# Patient Record
Sex: FEMALE | Race: WHITE | HISPANIC OR LATINO | Employment: UNEMPLOYED | URBAN - METROPOLITAN AREA
[De-identification: names, ages, dates, MRNs, and addresses within clinical notes are randomized per-mention and may not be internally consistent; named-entity substitution may affect disease eponyms.]

---

## 2018-02-12 ENCOUNTER — LAB (OUTPATIENT)
Dept: LAB | Facility: HOSPITAL | Age: 43
End: 2018-02-12
Payer: COMMERCIAL

## 2018-02-12 ENCOUNTER — OFFICE VISIT (OUTPATIENT)
Dept: FAMILY MEDICINE CLINIC | Facility: CLINIC | Age: 43
End: 2018-02-12
Payer: COMMERCIAL

## 2018-02-12 VITALS
TEMPERATURE: 97.5 F | WEIGHT: 113.2 LBS | HEART RATE: 113 BPM | HEIGHT: 65 IN | SYSTOLIC BLOOD PRESSURE: 102 MMHG | BODY MASS INDEX: 18.86 KG/M2 | DIASTOLIC BLOOD PRESSURE: 74 MMHG | OXYGEN SATURATION: 96 %

## 2018-02-12 DIAGNOSIS — E78.5 HYPERLIPIDEMIA, UNSPECIFIED HYPERLIPIDEMIA TYPE: ICD-10-CM

## 2018-02-12 DIAGNOSIS — Z12.31 ENCOUNTER FOR SCREENING MAMMOGRAM FOR BREAST CANCER: ICD-10-CM

## 2018-02-12 DIAGNOSIS — T78.40XA ALLERGIC STATE, INITIAL ENCOUNTER: ICD-10-CM

## 2018-02-12 DIAGNOSIS — R53.83 FATIGUE, UNSPECIFIED TYPE: ICD-10-CM

## 2018-02-12 DIAGNOSIS — R05.9 COUGH: Primary | ICD-10-CM

## 2018-02-12 DIAGNOSIS — Z83.71 FAMILY HX COLONIC POLYPS: ICD-10-CM

## 2018-02-12 DIAGNOSIS — R30.0 DYSURIA: ICD-10-CM

## 2018-02-12 LAB
ALBUMIN SERPL BCP-MCNC: 4.5 G/DL (ref 3.5–5)
ALP SERPL-CCNC: 69 U/L (ref 46–116)
ALT SERPL W P-5'-P-CCNC: 19 U/L (ref 12–78)
ANION GAP SERPL CALCULATED.3IONS-SCNC: 9 MMOL/L (ref 4–13)
AST SERPL W P-5'-P-CCNC: 22 U/L (ref 5–45)
BACTERIA UR QL AUTO: ABNORMAL /HPF
BASOPHILS # BLD AUTO: 0.06 THOUSANDS/ΜL (ref 0–0.1)
BASOPHILS NFR BLD AUTO: 1 % (ref 0–1)
BILIRUB SERPL-MCNC: 0.7 MG/DL (ref 0.2–1)
BILIRUB UR QL STRIP: NEGATIVE
BUN SERPL-MCNC: 9 MG/DL (ref 5–25)
CALCIUM SERPL-MCNC: 9.5 MG/DL (ref 8.3–10.1)
CHLORIDE SERPL-SCNC: 99 MMOL/L (ref 100–108)
CHOLEST SERPL-MCNC: 201 MG/DL (ref 50–200)
CLARITY UR: CLEAR
CO2 SERPL-SCNC: 29 MMOL/L (ref 21–32)
COLOR UR: YELLOW
CREAT SERPL-MCNC: 0.63 MG/DL (ref 0.6–1.3)
EOSINOPHIL # BLD AUTO: 0.08 THOUSAND/ΜL (ref 0–0.61)
EOSINOPHIL NFR BLD AUTO: 2 % (ref 0–6)
ERYTHROCYTE [DISTWIDTH] IN BLOOD BY AUTOMATED COUNT: 12.1 % (ref 11.6–15.1)
GFR SERPL CREATININE-BSD FRML MDRD: 112 ML/MIN/1.73SQ M
GLUCOSE P FAST SERPL-MCNC: 92 MG/DL (ref 65–99)
GLUCOSE UR STRIP-MCNC: NEGATIVE MG/DL
HCT VFR BLD AUTO: 46.2 % (ref 34.8–46.1)
HDLC SERPL-MCNC: 88 MG/DL (ref 40–60)
HGB BLD-MCNC: 15.3 G/DL (ref 11.5–15.4)
HGB UR QL STRIP.AUTO: ABNORMAL
KETONES UR STRIP-MCNC: NEGATIVE MG/DL
LDLC SERPL CALC-MCNC: 100 MG/DL (ref 0–100)
LEUKOCYTE ESTERASE UR QL STRIP: NEGATIVE
LYMPHOCYTES # BLD AUTO: 1.48 THOUSANDS/ΜL (ref 0.6–4.47)
LYMPHOCYTES NFR BLD AUTO: 35 % (ref 14–44)
MCH RBC QN AUTO: 31.9 PG (ref 26.8–34.3)
MCHC RBC AUTO-ENTMCNC: 33.1 G/DL (ref 31.4–37.4)
MCV RBC AUTO: 96 FL (ref 82–98)
MONOCYTES # BLD AUTO: 0.31 THOUSAND/ΜL (ref 0.17–1.22)
MONOCYTES NFR BLD AUTO: 7 % (ref 4–12)
NEUTROPHILS # BLD AUTO: 2.32 THOUSANDS/ΜL (ref 1.85–7.62)
NEUTS SEG NFR BLD AUTO: 55 % (ref 43–75)
NITRITE UR QL STRIP: NEGATIVE
NON-SQ EPI CELLS URNS QL MICRO: ABNORMAL /HPF
NRBC BLD AUTO-RTO: 0 /100 WBCS
PH UR STRIP.AUTO: 6 [PH] (ref 4.5–8)
PLATELET # BLD AUTO: 232 THOUSANDS/UL (ref 149–390)
PMV BLD AUTO: 10.8 FL (ref 8.9–12.7)
POTASSIUM SERPL-SCNC: 4.3 MMOL/L (ref 3.5–5.3)
PROT SERPL-MCNC: 8.6 G/DL (ref 6.4–8.2)
PROT UR STRIP-MCNC: NEGATIVE MG/DL
RBC # BLD AUTO: 4.8 MILLION/UL (ref 3.81–5.12)
RBC #/AREA URNS AUTO: ABNORMAL /HPF
SODIUM SERPL-SCNC: 137 MMOL/L (ref 136–145)
SP GR UR STRIP.AUTO: 1.02 (ref 1–1.03)
TRIGL SERPL-MCNC: 67 MG/DL
TSH SERPL DL<=0.05 MIU/L-ACNC: 1.85 UIU/ML (ref 0.36–3.74)
UROBILINOGEN UR QL STRIP.AUTO: 0.2 E.U./DL
WBC # BLD AUTO: 4.26 THOUSAND/UL (ref 4.31–10.16)
WBC #/AREA URNS AUTO: ABNORMAL /HPF

## 2018-02-12 PROCEDURE — 80053 COMPREHEN METABOLIC PANEL: CPT

## 2018-02-12 PROCEDURE — 84443 ASSAY THYROID STIM HORMONE: CPT

## 2018-02-12 PROCEDURE — 82785 ASSAY OF IGE: CPT

## 2018-02-12 PROCEDURE — 36415 COLL VENOUS BLD VENIPUNCTURE: CPT

## 2018-02-12 PROCEDURE — 99204 OFFICE O/P NEW MOD 45 MIN: CPT | Performed by: FAMILY MEDICINE

## 2018-02-12 PROCEDURE — 86003 ALLG SPEC IGE CRUDE XTRC EA: CPT

## 2018-02-12 PROCEDURE — 80061 LIPID PANEL: CPT

## 2018-02-12 PROCEDURE — 81001 URINALYSIS AUTO W/SCOPE: CPT | Performed by: FAMILY MEDICINE

## 2018-02-12 PROCEDURE — 85025 COMPLETE CBC W/AUTO DIFF WBC: CPT

## 2018-02-12 RX ORDER — AZITHROMYCIN 250 MG/1
TABLET, FILM COATED ORAL
Qty: 6 TABLET | Refills: 0 | Status: SHIPPED | OUTPATIENT
Start: 2018-02-12 | End: 2018-02-16

## 2018-02-12 RX ORDER — AZITHROMYCIN 250 MG/1
TABLET, FILM COATED ORAL DAILY
COMMUNITY
Start: 2015-01-14 | End: 2018-05-24 | Stop reason: CLARIF

## 2018-02-12 NOTE — PROGRESS NOTES
NEW PATIENT VISIT  TO ESTABLISH CARE    Assessment/Plan:    Cough   Fluticasone 1 spray twice a day   add some humidity to the house   are we will add some allergy panels to the screening blood work      Allergic state   Immuno cap allergy testing   colonoscopy   set up and ordered through gastro consult      Mammography   ordered in chart     allergies     immuno cap allergy testing ordered in chart           /74   Pulse (!) 113   Temp 97 5 °F (36 4 °C)   Ht 5' 5" (1 651 m)   Wt 51 3 kg (113 lb 3 2 oz)   SpO2 96%   BMI 18 84 kg/m²   Social History     Social History    Marital status: /Civil Union     Spouse name: N/A    Number of children: N/A    Years of education: N/A     Occupational History    Not on file  Social History Main Topics    Smoking status: Never Smoker    Smokeless tobacco: Never Used    Alcohol use No    Drug use: Unknown    Sexual activity: Not on file     Other Topics Concern    Not on file     Social History Narrative    No narrative on file     No past medical history on file  Family History   Problem Relation Age of Onset    No Known Problems Mother      No past surgical history on file  Current Outpatient Prescriptions:     azithromycin (ZITHROMAX) 250 mg tablet, Take by mouth daily, Disp: , Rfl:     azithromycin (ZITHROMAX) 250 mg tablet, Take 2 pills today then 1 pill on day 2 through 5 til done, Disp: 6 tablet, Rfl: 0      Subjective:      Patient ID: Rosalind Rhoades is a 40 y o  female    Patient with the following concerns:    New patient here to establish   also she has a cough but she is complaining of  She has been seen by Dr Eleuterio monreal for the cough   was seen by Dr Brandee Baird in the past      times 24 years with 3child  15years old   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                Review of Systems   Constitutional: Negative for activity change, fatigue, fever and unexpected weight change  HENT: Negative for congestion, ear pain, hearing loss, sinus pain, sore throat, tinnitus, trouble swallowing and voice change  Eyes: Negative for pain, discharge and visual disturbance  Respiratory: Positive for cough  Negative for chest tightness, shortness of breath and wheezing  Cardiovascular: Negative for chest pain, palpitations and leg swelling  Gastrointestinal: Negative for abdominal pain, blood in stool, diarrhea and vomiting  Endocrine: Negative for cold intolerance, heat intolerance and polyuria  Genitourinary: Negative for difficulty urinating, dysuria, flank pain, genital sores and urgency  Musculoskeletal: Negative for gait problem, joint swelling and neck stiffness  Skin: Negative for color change, rash and wound  Allergic/Immunologic: Negative for immunocompromised state  Neurological: Negative for syncope, speech difficulty and light-headedness  Psychiatric/Behavioral: Negative for behavioral problems, dysphoric mood and suicidal ideas  Objective:     Physical Exam   Constitutional: She is oriented to person, place, and time  She appears well-developed and well-nourished  HENT:   Head: Normocephalic and atraumatic  Eyes: Pupils are equal, round, and reactive to light  Neck: Normal range of motion  Neck supple  Cardiovascular: Normal rate and normal heart sounds  No murmur heard  Pulmonary/Chest: Effort normal and breath sounds normal  No respiratory distress  She exhibits no tenderness  Abdominal: Soft  Bowel sounds are normal    Musculoskeletal: Normal range of motion  She exhibits no tenderness  Lymphadenopathy:     She has no cervical adenopathy  Neurological: She is alert and oriented to person, place, and time  Coordination normal    Skin: Skin is warm and dry  Psychiatric: She has a normal mood and affect  Thought content normal    Nursing note and vitals reviewed        Social History     Social History    Marital status: /Civil [employer]     Spouse name: N/A    Number of children: N/A    Years of education: N/A     Occupational History    Not on file  Social History Main Topics    Smoking status: Never Smoker    Smokeless tobacco: Never Used    Alcohol use No    Drug use: Unknown    Sexual activity: Not on file     Other Topics Concern    Not on file     Social History Narrative    No narrative on file     No past medical history on file      Current Outpatient Prescriptions:     azithromycin (ZITHROMAX) 250 mg tablet, Take by mouth daily, Disp: , Rfl:     azithromycin (ZITHROMAX) 250 mg tablet, Take 2 pills today then 1 pill on day 2 through 5 til done, Disp: 6 tablet, Rfl: 0  Family History   Problem Relation Age of Onset    No Known Problems Mother        Patient Instructions    Fluticasone 1 spray twice a day   add some humidity to the house   are we will add some allergy panels to the screening blood work     mammo ordered   we will order Z-Melvin for the cough   set up a follow-up appointment in about   3 weeks   we will also we will also order the colonoscopy due to your mother's recent diagnosis of  "Pre cancerous polyps"      JERMAINE Reyes

## 2018-02-12 NOTE — ASSESSMENT & PLAN NOTE
Fluticasone 1 spray twice a day   add some humidity to the house   are we will add some allergy panels to the screening blood work

## 2018-02-12 NOTE — PATIENT INSTRUCTIONS
Fluticasone 1 spray twice a day   add some humidity to the house   are we will add some allergy panels to the screening blood work     mammo ordered   we will order Z-Melvin for the cough   set up a follow-up appointment in about   3 weeks   we will also we will also order the colonoscopy due to your mother's recent diagnosis of  "Pre cancerous polyps"

## 2018-02-13 ENCOUNTER — HOSPITAL ENCOUNTER (OUTPATIENT)
Dept: MAMMOGRAPHY | Facility: CLINIC | Age: 43
Discharge: HOME | End: 2018-02-13
Payer: COMMERCIAL

## 2018-02-13 DIAGNOSIS — Z12.31 ENCOUNTER FOR SCREENING MAMMOGRAM FOR BREAST CANCER: ICD-10-CM

## 2018-02-13 LAB
A ALTERNATA IGE QN: <0.1 KUA/I
A FUMIGATUS IGE QN: <0.1 KUA/I
ALLERGEN COMMENT: ABNORMAL
ALLERGEN COMMENT: ABNORMAL
ALMOND IGE QN: <0.1 KUA/I
BERMUDA GRASS IGE QN: <0.1 KUA/I
BOXELDER IGE QN: <0.1 KUA/I
C HERBARUM IGE QN: <0.1 KUA/I
CASHEW NUT IGE QN: <0.1 KUA/I
CAT DANDER IGE QN: <0.1 KUA/I
CMN PIGWEED IGE QN: <0.1 KUA/I
CODFISH IGE QN: <0.1 KUA/I
COMMON RAGWEED IGE QN: <0.1 KUA/I
COTTONWOOD IGE QN: <0.1 KUA/I
D FARINAE IGE QN: 0.1 KUA/I
D PTERONYSS IGE QN: 0.13 KUA/I
DOG DANDER IGE QN: <0.1 KUA/I
EGG WHITE IGE QN: <0.1 KUA/I
GLUTEN IGE QN: <0.1 KUA/I
HAZELNUT IGE QN: <0.1 KUA/L
LONDON PLANE IGE QN: <0.1 KUA/I
MILK IGE QN: <0.1 KUA/I
MOUSE URINE PROT IGE QN: <0.1 KUA/I
MT JUNIPER IGE QN: <0.1 KUA/I
MUGWORT IGE QN: <0.1 KUA/I
P NOTATUM IGE QN: <0.1 KUA/I
PEANUT IGE QN: <0.1 KUA/I
ROACH IGE QN: 0.26 KUA/I
SALMON IGE QN: <0.1 KUA/I
SCALLOP IGE QN: 0.11 KUA/L
SESAME SEED IGE QN: <0.1 KUA/I
SHEEP SORREL IGE QN: <0.1 KUA/I
SHRIMP IGE QN: <0.1 KUA/L
SILVER BIRCH IGE QN: <0.1 KUA/I
SOYBEAN IGE QN: <0.1 KUA/I
TIMOTHY IGE QN: <0.1 KUA/I
TOTAL IGE SMQN RAST: 113 KU/L (ref 0–113)
TOTAL IGE SMQN RAST: 122 KU/L (ref 0–113)
TUNA IGE QN: <0.1 KUA/I
WALNUT IGE QN: 0.26 KUA/I
WALNUT IGE QN: <0.1 KUA/I
WHEAT IGE QN: <0.1 KUA/I
WHITE ASH IGE QN: 0.66 KUA/I
WHITE ELM IGE QN: <0.1 KUA/I
WHITE MULBERRY IGE QN: <0.1 KUA/I
WHITE OAK IGE QN: <0.1 KUA/I

## 2018-02-13 PROCEDURE — 77067 SCR MAMMO BI INCL CAD: CPT

## 2018-02-15 DIAGNOSIS — R77.9 ELEVATED SERUM PROTEIN LEVEL: Primary | ICD-10-CM

## 2018-02-18 DIAGNOSIS — T78.40XA ALLERGIC STATE, INITIAL ENCOUNTER: Primary | ICD-10-CM

## 2018-02-18 RX ORDER — EPINEPHRINE 0.3 MG/.3ML
0.3 INJECTION SUBCUTANEOUS AS NEEDED
Status: SHIPPED | OUTPATIENT
Start: 2018-02-18

## 2018-02-23 NOTE — PROGRESS NOTES
Patient is aware and said she has not yet picked up epi pen and has stopped eating walnuts but is still eating some of the other foods she is allergic too

## 2018-03-06 ENCOUNTER — TELEPHONE (OUTPATIENT)
Dept: FAMILY MEDICINE CLINIC | Facility: CLINIC | Age: 43
End: 2018-03-06

## 2018-03-06 NOTE — TELEPHONE ENCOUNTER
Patient is aware that Taj Garcia is out of the office until Thursday    Tj Olivas Patient called requesting a refill of her antibiotics  She states that she finished the antibiotics from about 2 weeks ago  She was feeling better, but feels like it is coming back again  She uses [facility]   She can be reached at P: [phone]    Thank you, Tomas Field

## 2018-03-08 NOTE — TELEPHONE ENCOUNTER
She should come in  What she has now could possibly be viral etc  We do not order antibiotics without knowing exactly what we are treating    Just because the symptoms feel the same , does not mean the diagnosis is the same

## 2018-03-12 NOTE — TELEPHONE ENCOUNTER
Message left on patients voicemail to call us back to schedule an appointment if she is still having the symptoms    [id number]

## 2018-05-24 ENCOUNTER — OFFICE VISIT (OUTPATIENT)
Dept: FAMILY MEDICINE CLINIC | Facility: CLINIC | Age: 43
End: 2018-05-24
Payer: COMMERCIAL

## 2018-05-24 VITALS
OXYGEN SATURATION: 98 % | WEIGHT: 113.4 LBS | BODY MASS INDEX: 18.89 KG/M2 | HEIGHT: 65 IN | HEART RATE: 91 BPM | SYSTOLIC BLOOD PRESSURE: 110 MMHG | TEMPERATURE: 98.2 F | DIASTOLIC BLOOD PRESSURE: 62 MMHG

## 2018-05-24 DIAGNOSIS — J01.00 ACUTE NON-RECURRENT MAXILLARY SINUSITIS: Primary | ICD-10-CM

## 2018-05-24 DIAGNOSIS — H10.231 SEROUS CONJUNCTIVITIS OF RIGHT EYE: ICD-10-CM

## 2018-05-24 PROCEDURE — 99214 OFFICE O/P EST MOD 30 MIN: CPT | Performed by: FAMILY MEDICINE

## 2018-05-24 RX ORDER — AMOXICILLIN 875 MG/1
875 TABLET, COATED ORAL 2 TIMES DAILY
Qty: 20 TABLET | Refills: 0 | Status: SHIPPED | OUTPATIENT
Start: 2018-05-24 | End: 2018-06-03

## 2018-05-24 RX ORDER — MOXIFLOXACIN 5 MG/ML
1 SOLUTION/ DROPS OPHTHALMIC 3 TIMES DAILY
Qty: 6 ML | Refills: 0 | Status: SHIPPED | OUTPATIENT
Start: 2018-05-24 | End: 2018-05-29

## 2018-05-24 NOTE — PROGRESS NOTES
Standard Visit   Assessment/Plan:     Visit Diagnosis:  Diagnoses and all orders for this visit:    Acute non-recurrent maxillary sinusitis  -     amoxicillin (AMOXIL) 875 mg tablet; Take 1 tablet (875 mg total) by mouth 2 (two) times a day for 20 doses    Serous conjunctivitis of right eye  -     moxifloxacin (VIGAMOX) 0 5 % ophthalmic solution; Administer 1 drop to both eyes 3 (three) times a day for 5 days       complete the entire 10 days of the antibiotic   I want you to start over-the-counter Flonase 1 spray each side ear nose morning and night   Keep the nasal spray going until the end of spring   sleep with bedroom window shut    call if you fail to improve    Subjective:    Patient ID: Susannah Borges is a 40 y o  female  Patient is here with the following concerns:    Here with complaints of head congestion  Feeling very tired congested   sore throat that comes and goes   coughing now   And also her left eye is now red   scratchy and itchy   no fever      Sore Throat    Associated symptoms include congestion and coughing  Cough   Associated symptoms include a sore throat  The following portions of the patient's history were reviewed and updated as appropriate: allergies, current medications, past family history, past medical history, past social history, past surgical history and problem list     Review of Systems   HENT: Positive for congestion, sinus pressure, sneezing, sore throat and voice change  Respiratory: Positive for cough  /62   Pulse 91   Temp 98 2 °F (36 8 °C)   Ht 5' 5" (1 651 m)   Wt 51 4 kg (113 lb 6 4 oz)   SpO2 98%   BMI 18 87 kg/m²   Social History     Social History    Marital status: /Civil Union     Spouse name: N/A    Number of children: N/A    Years of education: N/A     Occupational History    Not on file       Social History Main Topics    Smoking status: Never Smoker    Smokeless tobacco: Never Used    Alcohol use No    Drug use: Unknown    Sexual activity: Not on file     Other Topics Concern    Not on file     Social History Narrative    No narrative on file     No past medical history on file  Family History   Problem Relation Age of Onset    No Known Problems Mother      No past surgical history on file  Current Outpatient Prescriptions:     amoxicillin (AMOXIL) 875 mg tablet, Take 1 tablet (875 mg total) by mouth 2 (two) times a day for 20 doses, Disp: 20 tablet, Rfl: 0    moxifloxacin (VIGAMOX) 0 5 % ophthalmic solution, Administer 1 drop to both eyes 3 (three) times a day for 5 days, Disp: 6 mL, Rfl: 0    Current Facility-Administered Medications:     EPINEPHrine (EPIPEN) injection 0 3 mg, 0 3 mg, Intramuscular, CHANNINGN, JERMAINE Narayan      Objective:     Physical Exam   Constitutional: She is oriented to person, place, and time  She appears well-developed and well-nourished  HENT:   Head: Normocephalic and atraumatic  Eyes: Pupils are equal, round, and reactive to light  Neck: Normal range of motion  Neck supple  Cardiovascular: Normal rate and normal heart sounds  No murmur heard  Pulmonary/Chest: Effort normal and breath sounds normal  No respiratory distress  She exhibits no tenderness  Abdominal: Soft  Bowel sounds are normal    Musculoskeletal: Normal range of motion  She exhibits no tenderness  Lymphadenopathy:     She has no cervical adenopathy  Neurological: She is alert and oriented to person, place, and time  Coordination normal    Skin: Skin is warm and dry  Psychiatric: She has a normal mood and affect  Thought content normal    Nursing note and vitals reviewed  Social History     Social History    Marital status: /Civil Union     Spouse name: N/A    Number of children: N/A    Years of education: N/A     Occupational History    Not on file       Social History Main Topics    Smoking status: Never Smoker    Smokeless tobacco: Never Used    Alcohol use No    Drug use: Unknown    Sexual activity: Not on file     Other Topics Concern    Not on file     Social History Narrative    No narrative on file     No past medical history on file  Current Outpatient Prescriptions:     amoxicillin (AMOXIL) 875 mg tablet, Take 1 tablet (875 mg total) by mouth 2 (two) times a day for 20 doses, Disp: 20 tablet, Rfl: 0    moxifloxacin (VIGAMOX) 0 5 % ophthalmic solution, Administer 1 drop to both eyes 3 (three) times a day for 5 days, Disp: 6 mL, Rfl: 0    Current Facility-Administered Medications:     EPINEPHrine (EPIPEN) injection 0 3 mg, 0 3 mg, Intramuscular, PRN, JERMAINE Linn  Family History   Problem Relation Age of Onset    No Known Problems Mother        There are no Patient Instructions on file for this visit       complete the entire 10 days of the antibiotic   I want you to start over-the-counter Flonase 1 spray each side ear nose morning and night   Keep the nasal spray going until the end of spring   sleep with bedroom window shut    call if you fail to improve        JERMAINE Linn

## 2018-05-24 NOTE — PATIENT INSTRUCTIONS
complete the entire 10 days of the antibiotic   I want you to start over-the-counter Flonase 1 spray each side ear nose morning and night   Keep the nasal spray going until the end of spring   sleep with bedroom window shut    call if you fail to improve

## 2019-02-22 ENCOUNTER — OFFICE VISIT (OUTPATIENT)
Dept: FAMILY MEDICINE CLINIC | Facility: CLINIC | Age: 44
End: 2019-02-22
Payer: COMMERCIAL

## 2019-02-22 VITALS
DIASTOLIC BLOOD PRESSURE: 72 MMHG | OXYGEN SATURATION: 98 % | WEIGHT: 113 LBS | SYSTOLIC BLOOD PRESSURE: 108 MMHG | BODY MASS INDEX: 18.83 KG/M2 | HEIGHT: 65 IN | RESPIRATION RATE: 17 BRPM | HEART RATE: 78 BPM | TEMPERATURE: 97.5 F

## 2019-02-22 DIAGNOSIS — J06.9 URI, ACUTE: Primary | ICD-10-CM

## 2019-02-22 PROCEDURE — 99213 OFFICE O/P EST LOW 20 MIN: CPT | Performed by: FAMILY MEDICINE

## 2019-02-22 RX ORDER — AZITHROMYCIN 250 MG/1
500 TABLET, FILM COATED ORAL EVERY 24 HOURS
Qty: 10 TABLET | Refills: 0 | Status: SHIPPED | OUTPATIENT
Start: 2019-02-22 | End: 2019-03-01 | Stop reason: SDUPTHER

## 2019-02-22 NOTE — PROGRESS NOTES
Assessment/Plan:           Problem List Items Addressed This Visit        Respiratory    URI, acute - Primary    Relevant Medications    azithromycin (ZITHROMAX) 250 mg tablet            Subjective:      Patient ID: Joseph Alvarez is a 44 y o  female  Patient comes in with a 2 week history of cough  The following portions of the patient's history were reviewed and updated as appropriate: allergies, current medications, past family history, past medical history, past social history, past surgical history and problem list     Review of Systems   Constitutional: Negative  HENT: Positive for congestion  Respiratory: Positive for cough  Gastrointestinal: Negative  Objective:      /72   Pulse 78   Temp 97 5 °F (36 4 °C)   Resp 17   Ht 5' 5" (1 651 m)   Wt 51 3 kg (113 lb)   SpO2 98%   BMI 18 80 kg/m²          Physical Exam   Constitutional: She is oriented to person, place, and time  She appears well-developed  HENT:   Head: Normocephalic and atraumatic  Right Ear: Tympanic membrane and external ear normal    Left Ear: Tympanic membrane and external ear normal    Paranasal sinus are red bilaterally  Posterior oropharynx is injected   Eyes: Pupils are equal, round, and reactive to light  EOM are normal    Neck: Neck supple  Cardiovascular: Normal rate, regular rhythm and normal heart sounds  Pulmonary/Chest: Effort normal and breath sounds normal    Abdominal: Soft  Musculoskeletal: Normal range of motion  Neurological: She is alert and oriented to person, place, and time  Skin: Skin is warm and dry  Psychiatric: She has a normal mood and affect

## 2019-02-22 NOTE — PATIENT INSTRUCTIONS
Cold Symptoms   AMBULATORY CARE:   Cold symptoms  include sneezing, dry throat, a stuffy nose, headache, watery eyes, and a cough  Your cough may be dry, or you may cough up mucus  You may also have muscle aches, joint pain, and tiredness  Rarely, you may have a fever  Cold symptoms occur from inflammation in your upper respiratory system caused by a virus  Most colds go away without treatment  Seek care immediately if:   · You have increased tiredness and weakness  · You are unable to eat  · Your heart is beating much faster than usual for you  · You see white spots in the back of your throat and your neck is swollen and sore to the touch  · You see pinpoint or larger reddish-purple dots on your skin  Contact your healthcare provider if:   · You have a fever higher than 102°F (38 9°C)  · You have new or worsening shortness of breath  · You have thick nasal drainage for more than 2 days  · Your symptoms do not improve or get worse within 5 days  · You have questions or concerns about your condition or care  Treatment for cold symptoms  may include NSAIDS to decrease muscle aches and fever  Cold medicines may also be given to decrease coughing, nasal stuffiness, sneezing, and a runny nose  Manage your cold symptoms: The following may help relieve cold symptoms, such as a dry throat and congestion:  · Gargle with mouthwash or warm salt water as directed  · Suck on throat lozenges or hard candy  · Use a cold or warm vaporizer or humidifier to ease your breathing  · Rest for at least 2 days and then as needed to decrease tiredness and weakness  · Use petroleum based jelly around your nostrils to decrease irritation from blowing your nose  · Drink plenty of liquids  Liquids will help thin and loosen thick mucus so you can cough it up  Liquids will also keep you hydrated   Ask your healthcare provider which liquids are best for you and how much to drink each day   Prevent the spread of germs  by washing your hands often  You can spread your cold germs to others for at least 3 days after your symptoms start  Do not share items, such as eating utensils  Cover your nose and mouth when you cough or sneeze using the crook of your elbow instead of your hands  Throw used tissues in the garbage  Do not smoke:  Smoking may worsen your symptoms and increase the length of time you feel sick  Talk with your healthcare provider if you need help to stop smoking  Follow up with your healthcare provider as directed:  Write down your questions so you remember to ask them during your visits  © 2017 [facility] Information is for End User's use only and may not be sold, redistributed or otherwise used for commercial purposes  All illustrations and images included in CareNotes® are the copyrighted property of [employer]  or Jose Lang  The above information is an  only  It is not intended as medical advice for individual conditions or treatments  Talk to your doctor, nurse or pharmacist before following any medical regimen to see if it is safe and effective for you

## 2019-02-28 ENCOUNTER — TELEPHONE (OUTPATIENT)
Dept: FAMILY MEDICINE CLINIC | Facility: CLINIC | Age: 44
End: 2019-02-28

## 2019-02-28 DIAGNOSIS — J06.9 URI, ACUTE: ICD-10-CM

## 2019-02-28 NOTE — TELEPHONE ENCOUNTER
Pt called about her cough, she called earlier and no one has reach out to her yet  She will be calling back in an hr

## 2019-02-28 NOTE — TELEPHONE ENCOUNTER
Spoke with the patient she stated that she finished an abx that was given to her by dr Jyotsna Chery and she is still having a cough

## 2019-03-01 DIAGNOSIS — J06.9 URI, ACUTE: Primary | ICD-10-CM

## 2019-03-01 RX ORDER — AZITHROMYCIN 250 MG/1
500 TABLET, FILM COATED ORAL EVERY 24 HOURS
Qty: 10 TABLET | Refills: 0 | Status: SHIPPED | OUTPATIENT
Start: 2019-03-01 | End: 2019-03-06

## 2019-03-01 RX ORDER — ALBUTEROL SULFATE 90 UG/1
2 AEROSOL, METERED RESPIRATORY (INHALATION) EVERY 4 HOURS PRN
Qty: 1 INHALER | Refills: 1 | Status: SHIPPED | OUTPATIENT
Start: 2019-03-01

## 2019-03-01 NOTE — TELEPHONE ENCOUNTER
Patient called back she was feeling better when on the abx but she has completed it and she has noticed that her cough is back    Please advise

## 2019-03-01 NOTE — TELEPHONE ENCOUNTER
Tell her to start plain mucinex twice a day  Add humidity to the air at home  Put a vaporizer right at the head of her bed at night  I also called in a proventil inhaler for her to take 2 puffs every 4 hrs as needed  Sometimes you just need to open up the lower airways of the lungs to help the cough  Use the mucinex to thin the secretions  If it continues for another 4-5 days, have her come in

## 2019-03-01 NOTE — TELEPHONE ENCOUNTER
She is calling back again and getting annoyed  Feeling the same way since she saw you  Finished the antibiotic on Tuesday  Cough will not go away

## 2019-03-05 NOTE — TELEPHONE ENCOUNTER
Tried to call patient on Friday, patient did not answer    Dolsie Also called patient and advised her    I followed up with patient    She is completing the antiobotic today and still is not feeling better     Scheduling patient for a follow up appt

## 2019-03-06 ENCOUNTER — OFFICE VISIT (OUTPATIENT)
Dept: FAMILY MEDICINE CLINIC | Facility: CLINIC | Age: 44
End: 2019-03-06
Payer: COMMERCIAL

## 2019-03-06 VITALS
DIASTOLIC BLOOD PRESSURE: 82 MMHG | HEIGHT: 65 IN | OXYGEN SATURATION: 98 % | BODY MASS INDEX: 18.66 KG/M2 | HEART RATE: 86 BPM | WEIGHT: 112 LBS | SYSTOLIC BLOOD PRESSURE: 112 MMHG

## 2019-03-06 DIAGNOSIS — J01.00 ACUTE NON-RECURRENT MAXILLARY SINUSITIS: Primary | ICD-10-CM

## 2019-03-06 PROCEDURE — 99214 OFFICE O/P EST MOD 30 MIN: CPT | Performed by: FAMILY MEDICINE

## 2019-03-06 RX ORDER — GUAIFENESIN 600 MG
TABLET, EXTENDED RELEASE 12 HR ORAL
Qty: 60 TABLET | Refills: 1 | Status: SHIPPED | OUTPATIENT
Start: 2019-03-06

## 2019-03-06 RX ORDER — AMOXICILLIN AND CLAVULANATE POTASSIUM 875; 125 MG/1; MG/1
1 TABLET, FILM COATED ORAL EVERY 12 HOURS SCHEDULED
Qty: 20 TABLET | Refills: 0 | Status: SHIPPED | OUTPATIENT
Start: 2019-03-06 | End: 2019-03-16

## 2019-03-06 NOTE — PROGRESS NOTES
Standard Visit   Assessment/Plan:     Visit Diagnosis:  Diagnoses and all orders for this visit:    Acute non-recurrent maxillary sinusitis  -     guaiFENesin (MUCINEX) 600 mg 12 hr tablet; Take 1 pill twice a day times 10 days  -     amoxicillin-clavulanate (AUGMENTIN) 875-125 mg per tablet; Take 1 tablet by mouth every 12 (twelve) hours for 10 days      For the next 10 days  Take the antibiotic Augmentin twice a day for 10 days  Finish the Augmentin until the entire bottle is complete  The Mucinex  Take it twice a day for 10 days  This is a thinning agent it will thin the mucus in her lungs and make it easier to cough out  The Proventil inhaler take 2 puffs every 4 hours until about 6:00 p m  This will open up her airways and make it easier to cough the mucus out  Drink lots of hot tea and honey, chicken noodle soup  Next put a vaporizer right by the head your  bed so you breathe in moisture all night long        Subjective:    Patient ID: Maurisio Garcia is a 38 y o  female  Patient is here with the following concerns:    Had Akash Timmons is here with the complaints of upper respiratory symptoms  She has completed 1 round of antibiotics he is not better  She has a cough  She has got chest pressure  She is a stay-at-home mom  She has electric heat in the house  She is miserable  She just feels awful  She is coughing she is tired        The following portions of the patient's history were reviewed and updated as appropriate: allergies, current medications, past family history, past medical history, past social history, past surgical history and problem list     Review of Systems   Constitutional: Negative for activity change and fever  HENT: Positive for congestion, rhinorrhea and sinus pressure  Negative for nosebleeds and trouble swallowing  Eyes: Negative  Respiratory: Positive for cough  Cardiovascular: Negative for palpitations     Gastrointestinal: Negative for abdominal pain, blood in stool and vomiting  Endocrine: Negative for cold intolerance  Genitourinary: Negative  Musculoskeletal: Negative for neck stiffness  Skin: Negative for pallor  Allergic/Immunologic: Negative for immunocompromised state  Neurological: Negative for seizures and facial asymmetry  Hematological: Negative for adenopathy  Psychiatric/Behavioral: Negative for agitation and suicidal ideas  /82   Pulse 86   Ht 5' 5" (1 651 m)   Wt 50 8 kg (112 lb)   SpO2 98%   BMI 18 64 kg/m²   Social History     Socioeconomic History    Marital status: /Civil Union     Spouse name: Not on file    Number of children: Not on file    Years of education: Not on file    Highest education level: Not on file   Occupational History    Not on file   Social Needs    Financial resource strain: Not on file    Food insecurity     Worry: Not on file     Inability: Not on file   [employer] needs     Medical: Not on file     Non-medical: Not on file   Tobacco Use    Smoking status: Never Smoker    Smokeless tobacco: Never Used   Substance and Sexual Activity    Alcohol use: No    Drug use: Not on file    Sexual activity: Not on file   Lifestyle    Physical activity     Days per week: Not on file     Minutes per session: Not on file    Stress: Not on file   Relationships    Social connections     Talks on phone: Not on file     Gets together: Not on file     Attends [employer] service: Not on file     Active member of club or organization: Not on file     Attends meetings of clubs or organizations: Not on file     Relationship status: Not on file    Intimate partner violence     Fear of current or ex partner: Not on file     Emotionally abused: Not on file     Physically abused: Not on file     Forced sexual activity: Not on file   Other Topics Concern    Not on file   Social History Narrative    Not on file     No past medical history on file    Family History   Problem Relation Age of Onset    No Known Problems Mother     Breast cancer Paternal Aunt 46     No past surgical history on file  Current Outpatient Medications:     albuterol (PROVENTIL HFA,VENTOLIN HFA) 90 mcg/act inhaler, Inhale 2 puffs every 4 (four) hours as needed for wheezing or shortness of breath (Patient not taking: Reported on 3/6/2019), Disp: 1 Inhaler, Rfl: 1    guaiFENesin (MUCINEX) 600 mg 12 hr tablet, Take 1 pill twice a day times 10 days, Disp: 60 tablet, Rfl: 1    Current Facility-Administered Medications:     EPINEPHrine (EPIPEN) injection 0 3 mg, 0 3 mg, Intramuscular, PRN, Elvia Labrum, CRNP      Objective:     Physical Exam   Constitutional: She is oriented to person, place, and time  She appears well-developed and well-nourished  HENT:   Head: Normocephalic and atraumatic  Eyes: Pupils are equal, round, and reactive to light  Neck: Normal range of motion  Neck supple  Cardiovascular: Normal rate  Pulmonary/Chest: Effort normal and breath sounds normal    Abdominal: Soft  Rebound:  zph  Musculoskeletal: Normal range of motion  Neurological: She is alert and oriented to person, place, and time  Skin: Skin is warm and dry  Psychiatric: She has a normal mood and affect  Nursing note and vitals reviewed        Social History     Socioeconomic History    Marital status: /Civil Union     Spouse name: Not on file    Number of children: Not on file    Years of education: Not on file    Highest education level: Not on file   Occupational History    Not on file   Social Needs    Financial resource strain: Not on file    Food insecurity     Worry: Not on file     Inability: Not on file   [employer] needs     Medical: Not on file     Non-medical: Not on file   Tobacco Use    Smoking status: Never Smoker    Smokeless tobacco: Never Used   Substance and Sexual Activity    Alcohol use: No    Drug use: Not on file    Sexual activity: Not on file   Lifestyle    Physical activity     Days per week: Not on file     Minutes per session: Not on file    Stress: Not on file   Relationships    Social connections     Talks on phone: Not on file     Gets together: Not on file     Attends [employer] service: Not on file     Active member of club or organization: Not on file     Attends meetings of clubs or organizations: Not on file     Relationship status: Not on file    Intimate partner violence     Fear of current or ex partner: Not on file     Emotionally abused: Not on file     Physically abused: Not on file     Forced sexual activity: Not on file   Other Topics Concern    Not on file   Social History Narrative    Not on file     No past medical history on file  Current Outpatient Medications:     albuterol (PROVENTIL HFA,VENTOLIN HFA) 90 mcg/act inhaler, Inhale 2 puffs every 4 (four) hours as needed for wheezing or shortness of breath (Patient not taking: Reported on 3/6/2019), Disp: 1 Inhaler, Rfl: 1    guaiFENesin (MUCINEX) 600 mg 12 hr tablet, Take 1 pill twice a day times 10 days, Disp: 60 tablet, Rfl: 1    Current Facility-Administered Medications:     EPINEPHrine (EPIPEN) injection 0 3 mg, 0 3 mg, Intramuscular, PRN, ElmyJERMAINE Cordova  Family History   Problem Relation Age of Onset    No Known Problems Mother     Breast cancer Paternal Aunt 46       Patient Instructions   For the next 10 days  Take the antibiotic Augmentin twice a day for 10 days  Finish the Augmentin until the entire bottle is complete  The Mucinex  Take it twice a day for 10 days  This is a thinning agent it will thin the mucus in her lungs and make it easier to cough out  The Proventil inhaler take 2 puffs every 4 hours until about 6:00 p m    This will open up her airways and make it easier to cough the mucus out  Drink lots of hot tea and honey, chicken noodle soup  Next put a vaporizer right by the head your  bed so you breathe in moisture all night long    Low Fat Diet   AMBULATORY CARE:   A low-fat diet  is an eating plan that is low in total fat, unhealthy fat, and cholesterol  You may need to follow a low-fat diet if you have trouble digesting or absorbing fat  You may also need to follow this diet if you have high cholesterol  You can also lower your cholesterol by increasing the amount of fiber in your diet  Soluble fiber is a type of fiber that helps to decrease cholesterol levels  Different types of fat in food:   · Limit unhealthy fats  A diet that is high in cholesterol, saturated fat, and trans fat may cause unhealthy cholesterol levels  Unhealthy cholesterol levels increase your risk of heart disease  ¨ Cholesterol:  Limit intake of cholesterol to less than 200 mg per day  Cholesterol is found in meat, eggs, and dairy  ¨ Saturated fat:  Limit saturated fat to less than 7% of your total daily calories  Ask your dietitian how many calories you need each day  Saturated fat is found in butter, cheese, ice cream, whole milk, and palm oil  Saturated fat is also found in meat, such as beef, pork, chicken skin, and processed meats  Processed meats include sausage, hot dogs, and bologna  ¨ Trans fat:  Avoid trans fat as much as possible  Trans fat is used in fried and baked foods  Foods that say trans fat free on the label may still have up to 0 5 grams of trans fat per serving  · Include healthy fats  Replace foods that are high in saturated and trans fat with foods high in healthy fats  This may help to decrease high cholesterol levels  ¨ Monounsaturated fats: These are found in avocados, nuts, and vegetable oils, such as olive, canola, and sunflower oil  ¨ Polyunsaturated fats: These can be found in vegetable oils, such as soybean or corn oil  Omega-3 fats can help to decrease the risk of heart disease  Omega-3 fats are found in fish, such as salmon, herring, trout, and tuna  Omega-3 fats can also be found in plant foods, such as walnuts, flaxseed, soybeans, and canola oil    Foods to limit or avoid:   · Grains:      ¨ Snacks that are made with partially hydrogenated oils, such as chips, regular crackers, and butter-flavored popcorn    ¨ High-fat baked goods, such as biscuits, croissants, doughnuts, pies, cookies, and pastries    · Dairy:      ¨ Whole milk, 2% milk, and yogurt and ice cream made with whole milk    ¨ Half and half creamer, heavy cream, and whipping cream    ¨ Cheese, cream cheese, and sour cream    · Meats and proteins:      ¨ High-fat cuts of meat (T-bone steak, regular hamburger, and ribs)    ¨ Fried meat, poultry (turkey and chicken), and fish    ¨ Poultry (chicken and turkey) with skin    ¨ Cold cuts (salami or bologna), hot dogs, carbajal, and sausage    ¨ Whole eggs and egg yolks    · Vegetables and fruits with added fat:      ¨ Fried vegetables or vegetables in butter or high-fat sauces, such as cream or cheese sauces    ¨ Fried fruit or fruit served with butter or cream    · Fats:      ¨ Butter, stick margarine, and shortening    ¨ Coconut, palm oil, and palm kernel oil  Foods to include:   · Grains:      ¨ Whole-grain breads, cereals, pasta, and brown rice    ¨ Low-fat crackers and pretzels    · Vegetables and fruits:      ¨ Fresh, frozen, or canned vegetables (no salt or low-sodium)    ¨ Fresh, frozen, dried, or canned fruit (canned in light syrup or fruit juice)    ¨ Avocado    · Low-fat dairy products:      ¨ Nonfat (skim) or 1% milk    ¨ Nonfat or low-fat cheese, yogurt, and cottage cheese    · Meats and proteins:      ¨ Chicken or turkey with no skin    ¨ Baked or broiled fish    ¨ Lean beef and pork (loin, round, extra lean hamburger)    ¨ Beans and peas, unsalted nuts, soy products    ¨ Egg whites and substitutes    ¨ Seeds and nuts    · Fats:      ¨ Unsaturated oil, such as canola, olive, peanut, soybean, or sunflower oil    ¨ Soft or liquid margarine and vegetable oil spread    ¨ Low-fat salad dressing  Other ways to decrease fat:   · Read food labels before you buy foods  Choose foods that have less than 30% of calories from fat  Choose low-fat or fat-free dairy products  Remember that fat free does not mean calorie free  These foods still contain calories, and too many calories can lead to weight gain  · Trim fat from meat and avoid fried food  Trim all visible fat from meat before you cook it  Remove the skin from poultry  Do not encarnacion meat, fish, or poultry  Bake, roast, boil, or broil these foods instead  Avoid fried foods  Eat a baked potato instead of [place] fries  Steam vegetables instead of sautéing them in butter  · Add less fat to foods  Use imitation carbajal bits on salads and baked potatoes instead of regular carbajal bits  Use fat-free or low-fat salad dressings instead of regular dressings  Use low-fat or nonfat butter-flavored topping instead of regular butter or margarine on popcorn and other foods  Ways to decrease fat in recipes:  Replace high-fat ingredients with low-fat or nonfat ones  This may cause baked goods to be drier than usual  You may need to use nonfat cooking spray on pans to prevent food from sticking  You also may need to change the amount of other ingredients, such as water, in the recipe  Try the following:  · Use low-fat or light margarine instead of regular margarine or shortening  · Use lean ground turkey breast or chicken, or lean ground beef (less than 5% fat) instead of hamburger  · Add 1 teaspoon of canola oil to 8 ounces of skim milk instead of using cream or half and half  · Use grated zucchini, carrots, or apples in breads instead of coconut  · Use blenderized, low-fat cottage cheese, plain tofu, or low-fat ricotta cheese instead of cream cheese  · Use 1 egg white and 1 teaspoon of canola oil, or use ¼ cup (2 ounces) of fat-free egg substitute instead of a whole egg  · Replace half of the oil that is called for in a recipe with applesauce when you bake   Use 3 tablespoons of cocoa powder and 1 tablespoon of canola oil instead of a square of baking chocolate  How to increase fiber:  Eat enough high-fiber foods to get 20 to 30 grams of fiber every day  Slowly increase your fiber intake to avoid stomach cramps, gas, and other problems  · Eat 3 ounces of whole-grain foods each day  An ounce is about 1 slice of bread  Eat whole-grain breads, such as whole-wheat bread  Whole wheat, whole-wheat flour, or other whole grains should be listed as the first ingredient on the food label  Replace white flour with whole-grain flour or use half of each in recipes  Whole-grain flour is heavier than white flour, so you may have to add more yeast or baking powder  · Eat a high-fiber cereal for breakfast   Oatmeal is a good source of soluble fiber  Look for cereals that have bran or fiber in the name  Choose whole-grain products, such as brown rice, barley, and whole-wheat pasta  · Eat more beans, peas, and lentils  For example, add beans to soups or salads  Eat at least 5 cups of fruits and vegetables each day  Eat fruits and vegetables with the peel because the peel is high in fiber  © 2017 [facility] Information is for End User's use only and may not be sold, redistributed or otherwise used for commercial purposes  All illustrations and images included in CareNotes® are the copyrighted property of [employer]  or Jose Lang  The above information is an  only  It is not intended as medical advice for individual conditions or treatments  Talk to your doctor, nurse or pharmacist before following any medical regimen to see if it is safe and effective for you  Heart Healthy Diet   AMBULATORY CARE:   A heart healthy diet  is an eating plan low in total fat, unhealthy fats, and sodium (salt)  A heart healthy diet helps decrease your risk for heart disease and stroke  Limit the amount of fat you eat to 25% to 35% of your total daily calories  Limit sodium to less than 2,300 mg each day  Healthy fats:  Healthy fats can help improve cholesterol levels  The risk for heart disease is decreased when cholesterol levels are normal  Choose healthy fats, such as the following:  · Unsaturated fat  is found in foods such as soybean, canola, olive, corn, and safflower oils  It is also found in soft tub margarine that is made with liquid vegetable oil  · Omega-3 fat  is found in certain fish, such as salmon, tuna, and trout, and in walnuts and flaxseed  Unhealthy fats:  Unhealthy fats can cause unhealthy cholesterol levels in your blood and increase your risk of heart disease  Limit unhealthy fats, such as the following:  · Cholesterol  is found in animal foods, such as eggs and lobster, and in dairy products made from whole milk  Limit cholesterol to less than 300 milligrams (mg) each day  You may need to limit cholesterol to 200 mg each day if you have heart disease  · Saturated fat  is found in meats, such as carbajal and hamburger  It is also found in chicken or turkey skin, whole milk, and butter  Limit saturated fat to less than 7% of your total daily calories  Limit saturated fat to less than 6% if you have heart disease or are at increased risk for it  · Trans fat  is found in packaged foods, such as potato chips and cookies  It is also in hard margarine, some fried foods, and shortening  Avoid trans fats as much as possible    Heart healthy foods and drinks to include:  Ask your dietitian or healthcare provider how many servings to have from each of the following food groups:  · Grains:      ¨ Whole-wheat breads, cereals, and pastas, and brown rice    ¨ Low-fat, low-sodium crackers and chips    · Vegetables:      ¨ Broccoli, green beans, green peas, and spinach    ¨ Collards, kale, and lima beans    ¨ Carrots, sweet potatoes, tomatoes, and peppers    ¨ Canned vegetables with no salt added    · Fruits:      ¨ Bananas, peaches, pears, and pineapple    ¨ Grapes, raisins, and dates    ¨ Oranges, tangerines, grapefruit, orange juice, and grapefruit juice    ¨ Apricots, mangoes, melons, and papaya    ¨ Raspberries and strawberries    ¨ Canned fruit with no added sugar    · Low-fat dairy products:      ¨ Nonfat (skim) milk, 1% milk, and low-fat almond, cashew, or soy milks fortified with calcium    ¨ Low-fat cheese, regular or frozen yogurt, and cottage cheese    · Meats and proteins , such as lean cuts of beef and pork (loin, leg, round), skinless chicken and turkey, legumes, soy products, egg whites, and nuts  Foods and drinks to limit or avoid:  Ask your dietitian or healthcare provider about these and other foods that are high in unhealthy fat, sodium, and sugar:  · Snack or packaged foods , such as frozen dinners, cookies, macaroni and cheese, and cereals with more than 300 mg of sodium per serving    · Canned or dry mixes  for cakes, soups, sauces, or gravies    · Vegetables with added sodium , such as instant potatoes, vegetables with added sauces, or regular canned vegetables    · Other foods high in sodium , such as ketchup, barbecue sauce, salad dressing, pickles, olives, soy sauce, and miso    · High-fat dairy foods  such as whole or 2% milk, cream cheese, or sour cream, and cheeses     · High-fat protein foods  such as high-fat cuts of beef (T-bone steaks, ribs), chicken or turkey with skin, and organ meats, such as liver    · Cured or smoked meats , such as hot dogs, carbajal, and sausage    · Unhealthy fats and oils , such as butter, stick margarine, shortening, and cooking oils such as coconut or palm oil    · Food and drinks high in sugar , such as soft drinks (soda), sports drinks, sweetened tea, candy, cake, cookies, pies, and doughnuts  Other diet guidelines to follow:   · Eat more foods containing omega-3 fats  Eat fish high in omega-3 fats at least 2 times a week  · Limit alcohol  Too much alcohol can damage your heart and raise your blood pressure   Women should limit alcohol to 1 drink a day  Men should limit alcohol to 2 drinks a day  A drink of alcohol is 12 ounces of beer, 5 ounces of wine, or 1½ ounces of liquor  · Choose low-sodium foods  High-sodium foods can lead to high blood pressure  Add little or no salt to food you prepare  Use herbs and spices in place of salt  · Eat more fiber  to help lower cholesterol levels  Eat at least 5 servings of fruits and vegetables each day  Eat 3 ounces of whole-grain foods each day  Legumes (beans) are also a good source of fiber  Lifestyle guidelines:   · Do not smoke  Nicotine and other chemicals in cigarettes and cigars can cause lung and heart damage  Ask your healthcare provider for information if you currently smoke and need help to quit  E-cigarettes or smokeless tobacco still contain nicotine  Talk to your healthcare provider before you use these products  · Exercise regularly  to help you maintain a healthy weight and improve your blood pressure and cholesterol levels  Ask your healthcare provider about the best exercise plan for you  Do not start an exercise program without asking your healthcare provider  Follow up with your healthcare provider as directed:  Write down your questions so you remember to ask them during your visits  © 2017 [address] [employer]  Information is for End User's use only and may not be sold, redistributed or otherwise used for commercial purposes  All illustrations and images included in CareNotes® are the copyrighted property of [employer]  or Jose Lang  The above information is an  only  It is not intended as medical advice for individual conditions or treatments  Talk to your doctor, nurse or pharmacist before following any medical regimen to see if it is safe and effective for you  JERMAINE Mitchell  BMI Counseling: Body mass index is 18 64 kg/m²   The BMI is above normal  Nutrition recommendations include reducing portion sizes, decreasing overall calorie intake, 3-5 servings of fruits/vegetables daily, reducing fast food intake, consuming healthier snacks, decreasing soda and/or juice intake, moderation in carbohydrate intake, increasing intake of lean protein, reducing intake of saturated fat and trans fat and reducing intake of cholesterol

## 2019-03-06 NOTE — PATIENT INSTRUCTIONS
For the next 10 days  Take the antibiotic Augmentin twice a day for 10 days  Finish the Augmentin until the entire bottle is complete  The Mucinex  Take it twice a day for 10 days  This is a thinning agent it will thin the mucus in her lungs and make it easier to cough out  The Proventil inhaler take 2 puffs every 4 hours until about 6:00 p m  This will open up her airways and make it easier to cough the mucus out  Drink lots of hot tea and honey, chicken noodle soup  Next put a vaporizer right by the head your  bed so you breathe in moisture all night long    Low Fat Diet   AMBULATORY CARE:   A low-fat diet  is an eating plan that is low in total fat, unhealthy fat, and cholesterol  You may need to follow a low-fat diet if you have trouble digesting or absorbing fat  You may also need to follow this diet if you have high cholesterol  You can also lower your cholesterol by increasing the amount of fiber in your diet  Soluble fiber is a type of fiber that helps to decrease cholesterol levels  Different types of fat in food:   · Limit unhealthy fats  A diet that is high in cholesterol, saturated fat, and trans fat may cause unhealthy cholesterol levels  Unhealthy cholesterol levels increase your risk of heart disease  ¨ Cholesterol:  Limit intake of cholesterol to less than 200 mg per day  Cholesterol is found in meat, eggs, and dairy  ¨ Saturated fat:  Limit saturated fat to less than 7% of your total daily calories  Ask your dietitian how many calories you need each day  Saturated fat is found in butter, cheese, ice cream, whole milk, and palm oil  Saturated fat is also found in meat, such as beef, pork, chicken skin, and processed meats  Processed meats include sausage, hot dogs, and bologna  ¨ Trans fat:  Avoid trans fat as much as possible  Trans fat is used in fried and baked foods  Foods that say trans fat free on the label may still have up to 0 5 grams of trans fat per serving      · Include healthy fats  Replace foods that are high in saturated and trans fat with foods high in healthy fats  This may help to decrease high cholesterol levels  ¨ Monounsaturated fats: These are found in avocados, nuts, and vegetable oils, such as olive, canola, and sunflower oil  ¨ Polyunsaturated fats: These can be found in vegetable oils, such as soybean or corn oil  Omega-3 fats can help to decrease the risk of heart disease  Omega-3 fats are found in fish, such as salmon, herring, trout, and tuna  Omega-3 fats can also be found in plant foods, such as walnuts, flaxseed, soybeans, and canola oil    Foods to limit or avoid:   · Grains:      ¨ Snacks that are made with partially hydrogenated oils, such as chips, regular crackers, and butter-flavored popcorn    ¨ High-fat baked goods, such as biscuits, croissants, doughnuts, pies, cookies, and pastries    · Dairy:      ¨ Whole milk, 2% milk, and yogurt and ice cream made with whole milk    ¨ Half and half creamer, heavy cream, and whipping cream    ¨ Cheese, cream cheese, and sour cream    · Meats and proteins:      ¨ High-fat cuts of meat (T-bone steak, regular hamburger, and ribs)    ¨ Fried meat, poultry (turkey and chicken), and fish    ¨ Poultry (chicken and turkey) with skin    ¨ Cold cuts (salami or bologna), hot dogs, carbajal, and sausage    ¨ Whole eggs and egg yolks    · Vegetables and fruits with added fat:      ¨ Fried vegetables or vegetables in butter or high-fat sauces, such as cream or cheese sauces    ¨ Fried fruit or fruit served with butter or cream    · Fats:      ¨ Butter, stick margarine, and shortening    ¨ Coconut, palm oil, and palm kernel oil  Foods to include:   · Grains:      ¨ Whole-grain breads, cereals, pasta, and brown rice    ¨ Low-fat crackers and pretzels    · Vegetables and fruits:      ¨ Fresh, frozen, or canned vegetables (no salt or low-sodium)    ¨ Fresh, frozen, dried, or canned fruit (canned in light syrup or fruit juice)    ¨ Avocado    · Low-fat dairy products:      ¨ Nonfat (skim) or 1% milk    ¨ Nonfat or low-fat cheese, yogurt, and cottage cheese    · Meats and proteins:      ¨ Chicken or turkey with no skin    ¨ Baked or broiled fish    ¨ Lean beef and pork (loin, round, extra lean hamburger)    ¨ Beans and peas, unsalted nuts, soy products    ¨ Egg whites and substitutes    ¨ Seeds and nuts    · Fats:      ¨ Unsaturated oil, such as canola, olive, peanut, soybean, or sunflower oil    ¨ Soft or liquid margarine and vegetable oil spread    ¨ Low-fat salad dressing  Other ways to decrease fat:   · Read food labels before you buy foods  Choose foods that have less than 30% of calories from fat  Choose low-fat or fat-free dairy products  Remember that fat free does not mean calorie free  These foods still contain calories, and too many calories can lead to weight gain  · Trim fat from meat and avoid fried food  Trim all visible fat from meat before you cook it  Remove the skin from poultry  Do not encarnacion meat, fish, or poultry  Bake, roast, boil, or broil these foods instead  Avoid fried foods  Eat a baked potato instead of Western Marie fries  Steam vegetables instead of sautéing them in butter  · Add less fat to foods  Use imitation carbajal bits on salads and baked potatoes instead of regular carbajal bits  Use fat-free or low-fat salad dressings instead of regular dressings  Use low-fat or nonfat butter-flavored topping instead of regular butter or margarine on popcorn and other foods  Ways to decrease fat in recipes:  Replace high-fat ingredients with low-fat or nonfat ones  This may cause baked goods to be drier than usual  You may need to use nonfat cooking spray on pans to prevent food from sticking  You also may need to change the amount of other ingredients, such as water, in the recipe  Try the following:  · Use low-fat or light margarine instead of regular margarine or shortening       · Use lean ground turkey breast or chicken, or lean ground beef (less than 5% fat) instead of hamburger  · Add 1 teaspoon of canola oil to 8 ounces of skim milk instead of using cream or half and half  · Use grated zucchini, carrots, or apples in breads instead of coconut  · Use blenderized, low-fat cottage cheese, plain tofu, or low-fat ricotta cheese instead of cream cheese  · Use 1 egg white and 1 teaspoon of canola oil, or use ¼ cup (2 ounces) of fat-free egg substitute instead of a whole egg  · Replace half of the oil that is called for in a recipe with applesauce when you bake  Use 3 tablespoons of cocoa powder and 1 tablespoon of canola oil instead of a square of baking chocolate  How to increase fiber:  Eat enough high-fiber foods to get 20 to 30 grams of fiber every day  Slowly increase your fiber intake to avoid stomach cramps, gas, and other problems  · Eat 3 ounces of whole-grain foods each day  An ounce is about 1 slice of bread  Eat whole-grain breads, such as whole-wheat bread  Whole wheat, whole-wheat flour, or other whole grains should be listed as the first ingredient on the food label  Replace white flour with whole-grain flour or use half of each in recipes  Whole-grain flour is heavier than white flour, so you may have to add more yeast or baking powder  · Eat a high-fiber cereal for breakfast   Oatmeal is a good source of soluble fiber  Look for cereals that have bran or fiber in the name  Choose whole-grain products, such as brown rice, barley, and whole-wheat pasta  · Eat more beans, peas, and lentils  For example, add beans to soups or salads  Eat at least 5 cups of fruits and vegetables each day  Eat fruits and vegetables with the peel because the peel is high in fiber  © 2017 [employer] Information is for End User's use only and may not be sold, redistributed or otherwise used for commercial purposes   All illustrations and images included in CareNotes® are the copyrighted property of [employer]  or Jose Lang  The above information is an  only  It is not intended as medical advice for individual conditions or treatments  Talk to your doctor, nurse or pharmacist before following any medical regimen to see if it is safe and effective for you  Heart Healthy Diet   AMBULATORY CARE:   A heart healthy diet  is an eating plan low in total fat, unhealthy fats, and sodium (salt)  A heart healthy diet helps decrease your risk for heart disease and stroke  Limit the amount of fat you eat to 25% to 35% of your total daily calories  Limit sodium to less than 2,300 mg each day  Healthy fats:  Healthy fats can help improve cholesterol levels  The risk for heart disease is decreased when cholesterol levels are normal  Choose healthy fats, such as the following:  · Unsaturated fat  is found in foods such as soybean, canola, olive, corn, and safflower oils  It is also found in soft tub margarine that is made with liquid vegetable oil  · Omega-3 fat  is found in certain fish, such as salmon, tuna, and trout, and in walnuts and flaxseed  Unhealthy fats:  Unhealthy fats can cause unhealthy cholesterol levels in your blood and increase your risk of heart disease  Limit unhealthy fats, such as the following:  · Cholesterol  is found in animal foods, such as eggs and lobster, and in dairy products made from whole milk  Limit cholesterol to less than 300 milligrams (mg) each day  You may need to limit cholesterol to 200 mg each day if you have heart disease  · Saturated fat  is found in meats, such as carbajal and hamburger  It is also found in chicken or turkey skin, whole milk, and butter  Limit saturated fat to less than 7% of your total daily calories  Limit saturated fat to less than 6% if you have heart disease or are at increased risk for it  · Trans fat  is found in packaged foods, such as potato chips and cookies   It is also in hard margarine, some fried foods, and shortening  Avoid trans fats as much as possible    Heart healthy foods and drinks to include:  Ask your dietitian or healthcare provider how many servings to have from each of the following food groups:  · Grains:      ¨ Whole-wheat breads, cereals, and pastas, and brown rice    ¨ Low-fat, low-sodium crackers and chips    · Vegetables:      ¨ Broccoli, green beans, green peas, and spinach    ¨ Collards, kale, and lima beans    ¨ Carrots, sweet potatoes, tomatoes, and peppers    ¨ Canned vegetables with no salt added    · Fruits:      ¨ Bananas, peaches, pears, and pineapple    ¨ Grapes, raisins, and dates    ¨ Oranges, tangerines, grapefruit, orange juice, and grapefruit juice    ¨ Apricots, mangoes, melons, and papaya    ¨ Raspberries and strawberries    ¨ Canned fruit with no added sugar    · Low-fat dairy products:      ¨ Nonfat (skim) milk, 1% milk, and low-fat almond, cashew, or soy milks fortified with calcium    ¨ Low-fat cheese, regular or frozen yogurt, and cottage cheese    · Meats and proteins , such as lean cuts of beef and pork (loin, leg, round), skinless chicken and turkey, legumes, soy products, egg whites, and nuts  Foods and drinks to limit or avoid:  Ask your dietitian or healthcare provider about these and other foods that are high in unhealthy fat, sodium, and sugar:  · Snack or packaged foods , such as frozen dinners, cookies, macaroni and cheese, and cereals with more than 300 mg of sodium per serving    · Canned or dry mixes  for cakes, soups, sauces, or gravies    · Vegetables with added sodium , such as instant potatoes, vegetables with added sauces, or regular canned vegetables    · Other foods high in sodium , such as ketchup, barbecue sauce, salad dressing, pickles, olives, soy sauce, and miso    · High-fat dairy foods  such as whole or 2% milk, cream cheese, or sour cream, and cheeses     · High-fat protein foods  such as high-fat cuts of beef (T-bone steaks, ribs), chicken or turkey with skin, and organ meats, such as liver    · Cured or smoked meats , such as hot dogs, carbajal, and sausage    · Unhealthy fats and oils , such as butter, stick margarine, shortening, and cooking oils such as coconut or palm oil    · Food and drinks high in sugar , such as soft drinks (soda), sports drinks, sweetened tea, candy, cake, cookies, pies, and doughnuts  Other diet guidelines to follow:   · Eat more foods containing omega-3 fats  Eat fish high in omega-3 fats at least 2 times a week  · Limit alcohol  Too much alcohol can damage your heart and raise your blood pressure  Women should limit alcohol to 1 drink a day  Men should limit alcohol to 2 drinks a day  A drink of alcohol is 12 ounces of beer, 5 ounces of wine, or 1½ ounces of liquor  · Choose low-sodium foods  High-sodium foods can lead to high blood pressure  Add little or no salt to food you prepare  Use herbs and spices in place of salt  · Eat more fiber  to help lower cholesterol levels  Eat at least 5 servings of fruits and vegetables each day  Eat 3 ounces of whole-grain foods each day  Legumes (beans) are also a good source of fiber  Lifestyle guidelines:   · Do not smoke  Nicotine and other chemicals in cigarettes and cigars can cause lung and heart damage  Ask your healthcare provider for information if you currently smoke and need help to quit  E-cigarettes or smokeless tobacco still contain nicotine  Talk to your healthcare provider before you use these products  · Exercise regularly  to help you maintain a healthy weight and improve your blood pressure and cholesterol levels  Ask your healthcare provider about the best exercise plan for you  Do not start an exercise program without asking your healthcare provider  Follow up with your healthcare provider as directed:  Write down your questions so you remember to ask them during your visits     © 2017 [address] [employer]ung Information is for End User's use only and may not be sold, redistributed or otherwise used for commercial purposes  All illustrations and images included in CareNotes® are the copyrighted property of A D A M , Inc  or Jose Lang  The above information is an  only  It is not intended as medical advice for individual conditions or treatments  Talk to your doctor, nurse or pharmacist before following any medical regimen to see if it is safe and effective for you

## 2020-06-16 ENCOUNTER — TELEPHONE (OUTPATIENT)
Dept: FAMILY MEDICINE CLINIC | Facility: CLINIC | Age: 45
End: 2020-06-16

## 2020-09-03 ENCOUNTER — TRANSCRIBE ORDERS (OUTPATIENT)
Dept: LAB | Facility: CLINIC | Age: 45
End: 2020-09-03

## 2020-09-03 DIAGNOSIS — Z12.31 ENCOUNTER FOR SCREENING MAMMOGRAM FOR BREAST CANCER: Primary | ICD-10-CM

## 2020-10-07 ENCOUNTER — HOSPITAL ENCOUNTER (OUTPATIENT)
Dept: MAMMOGRAPHY | Facility: CLINIC | Age: 45
Discharge: HOME | End: 2020-10-07
Payer: COMMERCIAL

## 2020-10-07 VITALS — BODY MASS INDEX: 18.64 KG/M2 | HEIGHT: 65 IN

## 2020-10-07 PROCEDURE — 77063 BREAST TOMOSYNTHESIS BI: CPT

## 2020-10-07 PROCEDURE — 77067 SCR MAMMO BI INCL CAD: CPT

## 2020-10-08 ENCOUNTER — TELEPHONE (OUTPATIENT)
Dept: FAMILY MEDICINE CLINIC | Facility: CLINIC | Age: 45
End: 2020-10-08

## 2021-12-14 ENCOUNTER — HOSPITAL ENCOUNTER (OUTPATIENT)
Dept: MAMMOGRAPHY | Facility: CLINIC | Age: 46
Discharge: HOME | End: 2021-12-14
Payer: COMMERCIAL

## 2021-12-14 VITALS — HEIGHT: 65 IN | BODY MASS INDEX: 17.16 KG/M2 | WEIGHT: 103 LBS

## 2021-12-14 PROCEDURE — 77067 SCR MAMMO BI INCL CAD: CPT

## 2021-12-14 PROCEDURE — 77063 BREAST TOMOSYNTHESIS BI: CPT

## 2022-08-03 ENCOUNTER — OFFICE VISIT (OUTPATIENT)
Dept: DERMATOLOGY | Facility: CLINIC | Age: 47
End: 2022-08-03
Payer: COMMERCIAL

## 2022-08-03 VITALS — BODY MASS INDEX: 17.16 KG/M2 | HEIGHT: 65 IN | WEIGHT: 103 LBS

## 2022-08-03 DIAGNOSIS — B07.0 PLANTAR VERRUCA: Primary | ICD-10-CM

## 2022-08-03 PROCEDURE — 99212 OFFICE O/P EST SF 10 MIN: CPT | Performed by: DERMATOLOGY

## 2022-08-03 NOTE — PROGRESS NOTES
[address]  [address] [facility]  [facility] [place] [address]  [phone]  [phone]     MRN: [id number] : 1975  Encounter: [id number]  Patient Information: Elisa Going    Subjective:     81-year-old female with history of plantar warts presents for follow-up patient notes lesion again on her left toe has been 6 years since we have last treated her warts     Objective:   Ht 5' 5" (1 651 m)   Wt 46 7 kg (103 lb)   BMI 17 14 kg/m²     Physical Exam:    General Appearance:    Alert, cooperative, no distress   Skin:  Keratotic area noted on the left great toe with interrupted skin line     Assessment:     1  Plantar verruca           Plan:   Verruca vulgaris patient advise that this is a viral infection for which we do not have specific treatment cryosurgery offers localized destruction which hopefully will induce an immune response patient to call if lesion not resolved      Prior to Admission medications    Medication Sig Start Date End Date Taking? Authorizing Provider   albuterol (PROVENTIL HFA,VENTOLIN HFA) 90 mcg/act inhaler Inhale 2 puffs every 4 (four) hours as needed for wheezing or shortness of breath  Patient not taking: No sig reported 3/1/19   JERMAINE Haque   guaiFENesin (MUCINEX) 600 mg 12 hr tablet Take 1 pill twice a day times 10 days  Patient not taking: Reported on 8/3/2022 3/6/19   JERMAINE Haque     No Known Allergies    Sylwia Alfaro MD  1243,7:29 PM    Portions of the record may have been created with voice recognition software   Occasional wrong word or "sound a like" substitutions may have occurred due to the inherent limitations of voice recognition software   Read the chart carefully and recognize, using context, where substitutions have occurred

## 2022-08-03 NOTE — PATIENT INSTRUCTIONS
Verruca vulgaris patient advise that this is a viral infection for which we do not have specific treatment cryosurgery offers localized destruction which hopefully will induce an immune response patient to call if lesion not resolved  Treatment with Cryotherapy    The doctor has treated your skin with nitrogen, which is 320 degrees Fahrenheit below zero  He has given the treated area "frostbite "    Stinging should subside within a few hours  You can take Tylenol for pain, if needed  Over the next few days, it is normal if the area becomes reddened, a blood blister, or swollen with fluid  If the lesion treated was near the eye - you could get a swollen eye over the next few days  Do not panic! This is all temporary, and will resolve with time  There is no special treatment - just keep the area clean  Makeup and BandAids can be used, if preferred  When the area starts to dry up and peel off, using Vaseline can help healing  It usually takes up to a month for it to heal   Some lesions are recurrent and may require repeat treatments  If a lesion has not healed in one month, please don't hesitate to contact us  If you have any further questions that are not answered here, please call us  [phone]    Thank you for allowing us to care for you

## 2022-12-16 ENCOUNTER — APPOINTMENT (OUTPATIENT)
Dept: LAB | Facility: HOSPITAL | Age: 47
End: 2022-12-16

## 2022-12-16 ENCOUNTER — OFFICE VISIT (OUTPATIENT)
Dept: FAMILY MEDICINE CLINIC | Facility: CLINIC | Age: 47
End: 2022-12-16

## 2022-12-16 VITALS
TEMPERATURE: 97.2 F | HEIGHT: 65 IN | DIASTOLIC BLOOD PRESSURE: 60 MMHG | BODY MASS INDEX: 17.69 KG/M2 | OXYGEN SATURATION: 99 % | SYSTOLIC BLOOD PRESSURE: 104 MMHG | HEART RATE: 93 BPM | WEIGHT: 106.2 LBS

## 2022-12-16 DIAGNOSIS — Z11.4 SCREENING FOR HIV WITHOUT PRESENCE OF RISK FACTORS: ICD-10-CM

## 2022-12-16 DIAGNOSIS — Z76.89 ENCOUNTER TO ESTABLISH CARE: Primary | ICD-10-CM

## 2022-12-16 DIAGNOSIS — Z00.00 HEALTHCARE MAINTENANCE: ICD-10-CM

## 2022-12-16 DIAGNOSIS — Z11.59 ENCOUNTER FOR HEPATITIS C SCREENING TEST FOR LOW RISK PATIENT: ICD-10-CM

## 2022-12-16 DIAGNOSIS — L73.8 PSEUDOFOLLICULITIS: ICD-10-CM

## 2022-12-16 DIAGNOSIS — R39.89 GENITAL SORE: ICD-10-CM

## 2022-12-16 LAB
ALBUMIN SERPL BCP-MCNC: 4.3 G/DL (ref 3.5–5)
ALP SERPL-CCNC: 59 U/L (ref 46–116)
ALT SERPL W P-5'-P-CCNC: 21 U/L (ref 12–78)
ANION GAP SERPL CALCULATED.3IONS-SCNC: 5 MMOL/L (ref 4–13)
AST SERPL W P-5'-P-CCNC: 21 U/L (ref 5–45)
BASOPHILS # BLD AUTO: 0.04 THOUSANDS/ÂΜL (ref 0–0.1)
BASOPHILS NFR BLD AUTO: 1 % (ref 0–1)
BILIRUB SERPL-MCNC: 0.63 MG/DL (ref 0.2–1)
BUN SERPL-MCNC: 9 MG/DL (ref 5–25)
CALCIUM SERPL-MCNC: 9.3 MG/DL (ref 8.3–10.1)
CHLORIDE SERPL-SCNC: 106 MMOL/L (ref 96–108)
CHOLEST SERPL-MCNC: 186 MG/DL
CO2 SERPL-SCNC: 27 MMOL/L (ref 21–32)
CREAT SERPL-MCNC: 0.61 MG/DL (ref 0.6–1.3)
EOSINOPHIL # BLD AUTO: 0.02 THOUSAND/ÂΜL (ref 0–0.61)
EOSINOPHIL NFR BLD AUTO: 0 % (ref 0–6)
ERYTHROCYTE [DISTWIDTH] IN BLOOD BY AUTOMATED COUNT: 12.5 % (ref 11.6–15.1)
EST. AVERAGE GLUCOSE BLD GHB EST-MCNC: 100 MG/DL
GFR SERPL CREATININE-BSD FRML MDRD: 109 ML/MIN/1.73SQ M
GLUCOSE P FAST SERPL-MCNC: 91 MG/DL (ref 65–99)
HBA1C MFR BLD: 5.1 %
HCT VFR BLD AUTO: 45.1 % (ref 34.8–46.1)
HCV AB SER QL: NORMAL
HDLC SERPL-MCNC: 76 MG/DL
HGB BLD-MCNC: 14.6 G/DL (ref 11.5–15.4)
HIV 1+2 AB+HIV1 P24 AG SERPL QL IA: NORMAL
HIV 2 AB SERPL QL IA: NORMAL
HIV1 AB SERPL QL IA: NORMAL
HIV1 P24 AG SERPL QL IA: NORMAL
IMM GRANULOCYTES # BLD AUTO: 0.01 THOUSAND/UL (ref 0–0.2)
IMM GRANULOCYTES NFR BLD AUTO: 0 % (ref 0–2)
LDLC SERPL CALC-MCNC: 95 MG/DL (ref 0–100)
LYMPHOCYTES # BLD AUTO: 1.52 THOUSANDS/ÂΜL (ref 0.6–4.47)
LYMPHOCYTES NFR BLD AUTO: 24 % (ref 14–44)
MCH RBC QN AUTO: 31.9 PG (ref 26.8–34.3)
MCHC RBC AUTO-ENTMCNC: 32.4 G/DL (ref 31.4–37.4)
MCV RBC AUTO: 99 FL (ref 82–98)
MONOCYTES # BLD AUTO: 0.33 THOUSAND/ÂΜL (ref 0.17–1.22)
MONOCYTES NFR BLD AUTO: 5 % (ref 4–12)
NEUTROPHILS # BLD AUTO: 4.33 THOUSANDS/ÂΜL (ref 1.85–7.62)
NEUTS SEG NFR BLD AUTO: 70 % (ref 43–75)
NONHDLC SERPL-MCNC: 110 MG/DL
NRBC BLD AUTO-RTO: 0 /100 WBCS
PLATELET # BLD AUTO: 269 THOUSANDS/UL (ref 149–390)
PMV BLD AUTO: 10.9 FL (ref 8.9–12.7)
POTASSIUM SERPL-SCNC: 3.7 MMOL/L (ref 3.5–5.3)
PROT SERPL-MCNC: 8.1 G/DL (ref 6.4–8.4)
RBC # BLD AUTO: 4.58 MILLION/UL (ref 3.81–5.12)
SODIUM SERPL-SCNC: 138 MMOL/L (ref 135–147)
TRIGL SERPL-MCNC: 75 MG/DL
TSH SERPL DL<=0.05 MIU/L-ACNC: 1.51 UIU/ML (ref 0.45–4.5)
WBC # BLD AUTO: 6.25 THOUSAND/UL (ref 4.31–10.16)

## 2022-12-16 RX ORDER — CLINDAMYCIN PHOSPHATE 10 MG/G
GEL TOPICAL 2 TIMES DAILY
Qty: 30 G | Refills: 0 | Status: SHIPPED | OUTPATIENT
Start: 2022-12-16

## 2022-12-16 RX ORDER — BIOTIN 10 MG
TABLET ORAL
COMMUNITY

## 2022-12-16 RX ORDER — AMOXICILLIN AND CLAVULANATE POTASSIUM 875; 125 MG/1; MG/1
1 TABLET, FILM COATED ORAL EVERY 12 HOURS SCHEDULED
Qty: 20 TABLET | Refills: 0 | Status: SHIPPED | OUTPATIENT
Start: 2022-12-16 | End: 2022-12-26

## 2022-12-16 NOTE — PROGRESS NOTES
Depression Screening and Follow-up Plan: Patient was screened for depression during today's encounter  They screened negative with a PHQ-2 score of 2  Assessment/Plan:     Problem List Items Addressed This Visit        Musculoskeletal and Integument    Pseudofolliculitis     Start taking abx 2xdaily for 10 days, use clindamycin gel 2x daily as well  Will call with culture results  Relevant Medications    amoxicillin-clavulanate (Augmentin) 875-125 mg per tablet    clindamycin (CLINDAGEL) 1 % gel    Other Relevant Orders    Incision and Drainage (Completed)    Wound culture and Gram stain       Other    RESOLVED: Genital sore    Relevant Medications    amoxicillin-clavulanate (Augmentin) 875-125 mg per tablet    clindamycin (CLINDAGEL) 1 % gel   Other Visit Diagnoses     Encounter to establish care    -  Primary    Healthcare maintenance        Relevant Orders    CBC and differential (Completed)    Comprehensive metabolic panel    Hemoglobin A1C    Lipid panel    TSH, 3rd generation with Free T4 reflex    Vitamin D 25 hydroxy    Encounter for hepatitis C screening test for low risk patient        Relevant Orders    Hepatitis C antibody    Screening for HIV without presence of risk factors        Relevant Orders    HIV 1/2 AG/AB w Reflex [facility] for over 2 yr old            Subjective:      Patient ID: Dora Rodriguez is a 56 y o  female  Pt presents to the office in order to reestablish care  She is also co of abscess on vaginal area that she noticed yesterday  She has not tried anything for it, nothing makes it better or worse  No new sexual partners         The following portions of the patient's history were reviewed and updated as appropriate:   Past Medical History:  She has no past medical history on file ,  _______________________________________________________________________  Medical Problems:  does not have any pertinent problems on file ,  _______________________________________________________________________  Past Surgical History:   has no past surgical history on file ,  _______________________________________________________________________  Family History:  family history includes Breast cancer (age of onset: 48) in her paternal aunt; No Known Problems in her mother ,  _______________________________________________________________________  Social History:   reports that she has never smoked  She has never used smokeless tobacco  She reports that she does not drink alcohol  No history on file for drug use ,  _______________________________________________________________________  Allergies:  has No Known Allergies     _______________________________________________________________________  Current Outpatient Medications   Medication Sig Dispense Refill   • albuterol (PROVENTIL HFA,VENTOLIN HFA) 90 mcg/act inhaler Inhale 2 puffs every 4 (four) hours as needed for wheezing or shortness of breath 1 Inhaler 1   • amoxicillin-clavulanate (Augmentin) 875-125 mg per tablet Take 1 tablet by mouth every 12 (twelve) hours for 10 days 20 tablet 0   • Biotin 10 MG TABS Take by mouth     • clindamycin (CLINDAGEL) 1 % gel Apply topically 2 (two) times a day 30 g 0   • Multiple Vitamin (MULTIVITAMIN ADULT PO) Take 1 tablet by mouth daily     • guaiFENesin (MUCINEX) 600 mg 12 hr tablet Take 1 pill twice a day times 10 days (Patient not taking: Reported on 12/16/2022) 60 tablet 1     Current Facility-Administered Medications   Medication Dose Route Frequency Provider Last Rate Last Admin   • EPINEPHrine (EPIPEN) injection 0 3 mg  0 3 mg Intramuscular PRN JERMAINE Dey         _______________________________________________________________________  Review of Systems   Constitutional: Negative for chills and fever  Respiratory: Negative for cough and shortness of breath  Cardiovascular: Negative for chest pain     Gastrointestinal: Negative for abdominal pain and vomiting  Genitourinary: Positive for genital sores  Negative for dysuria  Musculoskeletal: Negative for arthralgias and back pain  Neurological: Negative for headaches  All other systems reviewed and are negative  Objective:  Vitals:    12/16/22 0842   BP: 104/60   BP Location: Right arm   Patient Position: Sitting   Pulse: 93   Temp: (!) 97 2 °F (36 2 °C)   TempSrc: Tympanic   SpO2: 99%   Weight: 48 2 kg (106 lb 3 2 oz)   Height: 5' 5" (1 651 m)     Body mass index is 17 67 kg/m²  Physical Exam  Vitals and nursing note reviewed  Constitutional:       General: She is not in acute distress  Appearance: Normal appearance  She is not ill-appearing  Cardiovascular:      Rate and Rhythm: Normal rate and regular rhythm  Heart sounds: Normal heart sounds  Pulmonary:      Effort: Pulmonary effort is normal       Breath sounds: Normal breath sounds  Genitourinary:     Labia:         Right: Tenderness and lesion present  Musculoskeletal:         General: Normal range of motion  Skin:     General: Skin is warm and dry  Neurological:      Mental Status: She is alert and oriented to person, place, and time  Psychiatric:         Mood and Affect: Mood normal          Behavior: Behavior normal          Thought Content: Thought content normal          Judgment: Judgment normal          Incision and Drainage    Date/Time: 12/16/2022 9:15 AM  Performed by: JERMAINE Coy  Authorized by: JERMAINE Coy   Universal Protocol:  Consent: Verbal consent obtained  Consent given by: patient  Time out: Immediately prior to procedure a "time out" was called to verify the correct patient, procedure, equipment, support staff and site/side marked as required    Patient understanding: patient states understanding of the procedure being performed  Patient consent: the patient's understanding of the procedure matches consent given  Relevant documents: relevant documents present and verified  Site marked: the operative site was marked  Patient identity confirmed: verbally with patient      Patient location:  Bedside  Location:     Type:  Abscess    Size:  1" diameter    Location: R vaginal labia   Pre-procedure details:     Skin preparation:  Betadine  Anesthesia (see MAR for exact dosages): Anesthesia method:  Local infiltration    Local anesthetic:  Lidocaine 2% w/o epi  Procedure details:     Complexity:  Simple    Needle aspiration: no      Incision types:  Stab incision and single straight    Scalpel blade:  11    Approach:  Open    Incision depth:  Dermal    Wound management:  Probed and deloculated    Drainage:  Purulent and bloody    Drainage amount: Moderate    Wound treatment:  Wound left open    Packing materials:  None  Post-procedure details:     Patient tolerance of procedure:   Tolerated well, no immediate complications

## 2022-12-16 NOTE — ASSESSMENT & PLAN NOTE
Start taking abx 2xdaily for 10 days, use clindamycin gel 2x daily as well  Will call with culture results

## 2022-12-16 NOTE — PATIENT INSTRUCTIONS
Cyst   AMBULATORY CARE:   A cyst  is a round, firm lump found almost anywhere on your body  Cysts may grow slowly but are usually not cancer  Signs and symptoms of a cyst  depend on the type and where it is located  You may have any of the following:  A round, firm lump    A lump that changes size and may disappear or reappear    Pain, numbness, swelling, or muscle weakness where you have the cyst    Stiffness or tightness in your knee that may get worse with movement    In women with an ovarian cyst, pain during sex, abdominal swelling, pain before or during a monthly period    Seek care immediately if:   You have a fever  The area around your cyst becomes swollen, red, and painful  Your cyst continues to drain for 2 days after you start antibiotics  Call your doctor if:   You continue to have pain, even after treatment  Your cyst returns or gets larger  Your limb that has the cyst gets weak, numb, stiff, or unstable  You have questions or concerns about your condition or care  Treatment  may include any of the following:  NSAIDs , such as ibuprofen, help decrease swelling, pain, and fever  NSAIDs can cause stomach bleeding or kidney problems in certain people  If you take blood thinner medicine, always ask your healthcare provider if NSAIDs are safe for you  Always read the medicine label and follow directions  For women, birth control pills  may help control your monthly cycle, prevent ovarian cysts, or cause them to shrink  Aspiration  is a procedure used to drain fluid from the cyst through a needle  Steroid medicine  may be injected into the cyst to decrease inflammation  Surgery  may be needed to remove the cyst     Follow up with your doctor as directed:  Write down your questions so you remember to ask during your visits  © Copyright [employer] 2022 Information is for End User's use only and may not be sold, redistributed or otherwise used for commercial purposes   All illustrations and images included in CareNotes® are the copyrighted property of A D A M , Inc  or Angel Luis Cheung  The above information is an  only  It is not intended as medical advice for individual conditions or treatments  Talk to your doctor, nurse or pharmacist before following any medical regimen to see if it is safe and effective for you

## 2022-12-18 LAB
25(OH)D3 SERPL-MCNC: 50.5 NG/ML (ref 30–100)
BACTERIA WND AEROBE CULT: NORMAL
GRAM STN SPEC: NORMAL

## 2022-12-19 ENCOUNTER — HOSPITAL ENCOUNTER (OUTPATIENT)
Dept: MAMMOGRAPHY | Facility: CLINIC | Age: 47
Discharge: HOME | End: 2022-12-19

## 2022-12-19 VITALS — BODY MASS INDEX: 17.66 KG/M2 | WEIGHT: 106 LBS | HEIGHT: 65 IN

## 2022-12-19 DIAGNOSIS — Z12.31 SCREENING MAMMOGRAM FOR HIGH-RISK PATIENT: ICD-10-CM

## 2022-12-19 LAB
BACTERIA WND AEROBE CULT: NORMAL
GRAM STN SPEC: NORMAL

## 2022-12-30 ENCOUNTER — TELEPHONE (OUTPATIENT)
Dept: ADMINISTRATIVE | Facility: OTHER | Age: 47
End: 2022-12-30

## 2022-12-30 ENCOUNTER — OFFICE VISIT (OUTPATIENT)
Dept: FAMILY MEDICINE CLINIC | Facility: CLINIC | Age: 47
End: 2022-12-30

## 2022-12-30 VITALS
HEART RATE: 72 BPM | HEIGHT: 65 IN | SYSTOLIC BLOOD PRESSURE: 106 MMHG | DIASTOLIC BLOOD PRESSURE: 70 MMHG | RESPIRATION RATE: 12 BRPM | BODY MASS INDEX: 17.83 KG/M2 | TEMPERATURE: 98.3 F | OXYGEN SATURATION: 99 % | WEIGHT: 107 LBS

## 2022-12-30 DIAGNOSIS — Z00.00 ANNUAL PHYSICAL EXAM: Primary | ICD-10-CM

## 2022-12-30 NOTE — TELEPHONE ENCOUNTER
----- Message from Paco Ford, [address] sent at 12/30/2022 10:48 AM EST -----  Regarding: hm on pap  12/30/22 10:48 AM    Hello, our patient Bard Mitchell has had Pap Smear (HPV) aka Cervical Cancer Screening completed/performed  Please assist in updating the patient chart by pulling the document from lab Tab within Chart Review  The date of service is 09/30/2020       Thank you,  Paco Ford, AKIL LOCKETT [address] [facility]

## 2022-12-30 NOTE — PATIENT INSTRUCTIONS

## 2022-12-30 NOTE — PROGRESS NOTES
[facility] [address] [facility]    NAME: Suzy Troy  AGE: 56 y o  SEX: female  : 1975     DATE: 2022     Assessment and Plan:     Problem List Items Addressed This Visit    None  Visit Diagnoses     Annual physical exam    -  Primary          Immunizations and preventive care screenings were discussed with patient today  Appropriate education was printed on patient's after visit summary  Counseling:  Alcohol/drug use: discussed moderation in alcohol intake, the recommendations for healthy alcohol use, and avoidance of illicit drug use  Dental Health: discussed importance of regular tooth brushing, flossing, and dental visits  Injury prevention: discussed safety/seat belts, safety helmets, smoke detectors, carbon dioxide detectors, and smoking near bedding or upholstery  Sexual health: discussed sexually transmitted diseases, partner selection, use of condoms, avoidance of unintended pregnancy, and contraceptive alternatives  Exercise: the importance of regular exercise/physical activity was discussed  Recommend exercise 3-5 times per week for at least 30 minutes  Return in about 1 year (around 2023) for Annual physical      Chief Complaint:     Chief Complaint   Patient presents with   • Physical Exam     Due for colo screening fit kit      History of Present Illness:     Adult Annual Physical   Patient here for a comprehensive physical exam  The patient reports no problems  Diet and Physical Activity  Diet/Nutrition: well balanced diet  Exercise: walking  Depression Screening  PHQ-2/9 Depression Screening         General Health  Sleep: sleeps well  Hearing: normal - bilateral   Vision: no vision problems, most recent eye exam >1 year ago and wears glasses  Dental: regular dental visits and brushes teeth twice daily         /GYN Health  Patient is: premenopausal  Last menstrual period: 12/14/22  Contraceptive method: none  Review of Systems:     Review of Systems   Constitutional: Negative for chills and fever  Respiratory: Negative for cough and shortness of breath  Cardiovascular: Negative for chest pain  Gastrointestinal: Negative for abdominal pain and vomiting  Genitourinary: Negative for dysuria  Musculoskeletal: Negative for arthralgias and back pain  Neurological: Negative for headaches  All other systems reviewed and are negative  Past Medical History:     History reviewed  No pertinent past medical history  Past Surgical History:     History reviewed  No pertinent surgical history     Social History:     Social History     Socioeconomic History   • Marital status: /Civil Union     Spouse name: None   • Number of children: None   • Years of education: None   • Highest education level: None   Occupational History   • None   Tobacco Use   • Smoking status: Never   • Smokeless tobacco: Never   Substance and Sexual Activity   • Alcohol use: No   • Drug use: None   • Sexual activity: None   Other Topics Concern   • None   Social History Narrative   • None     Social Determinants of Health     Financial Resource Strain: Not on file   Food Insecurity: Not on file   Transportation Needs: Not on file   Physical Activity: Not on file   Stress: Not on file   Social Connections: Not on file   Intimate Partner Violence: Not on file   Housing Stability: Not on file      Family History:     Family History   Problem Relation Age of Onset   • No Known Problems Mother    • Breast cancer Paternal Aunt 48      Current Medications:     Current Outpatient Medications   Medication Sig Dispense Refill   • Biotin 10 MG TABS Take by mouth     • Multiple Vitamin (MULTIVITAMIN ADULT PO) Take 1 tablet by mouth daily     • albuterol (PROVENTIL HFA,VENTOLIN HFA) 90 mcg/act inhaler Inhale 2 puffs every 4 (four) hours as needed for wheezing or shortness of breath 1 Inhaler 1   • clindamycin (CLINDAGEL) 1 % gel Apply topically 2 (two) times a day 30 g 0   • guaiFENesin (MUCINEX) 600 mg 12 hr tablet Take 1 pill twice a day times 10 days (Patient not taking: Reported on 12/16/2022) 60 tablet 1     Current Facility-Administered Medications   Medication Dose Route Frequency Provider Last Rate Last Admin   • EPINEPHrine (EPIPEN) injection 0 3 mg  0 3 mg Intramuscular PRN Brynn Schilder, CRNP          Allergies:     No Known Allergies   Physical Exam:     /70   Pulse 72   Temp 98 3 °F (36 8 °C)   Resp 12   Ht 5' 5" (1 651 m)   Wt 48 5 kg (107 lb)   LMP 12/13/2022   SpO2 99%   BMI 17 81 kg/m²     Physical Exam  Vitals and nursing note reviewed  Constitutional:       General: She is not in acute distress  Appearance: Normal appearance  She is well-developed  She is not ill-appearing  Cardiovascular:      Rate and Rhythm: Normal rate and regular rhythm  Pulses: Normal pulses  Heart sounds: Normal heart sounds  No murmur heard  Pulmonary:      Effort: Pulmonary effort is normal  No respiratory distress  Breath sounds: Normal breath sounds  Musculoskeletal:         General: No swelling or tenderness  Normal range of motion  Right lower leg: No edema  Left lower leg: No edema  Skin:     General: Skin is warm and dry  Capillary Refill: Capillary refill takes less than 2 seconds  Neurological:      Mental Status: She is alert and oriented to person, place, and time  Psychiatric:         Mood and Affect: Mood normal          Behavior: Behavior normal          Thought Content:  Thought content normal          Judgment: Judgment normal           Elma Mcpherson, [address] [facility]

## 2023-01-03 NOTE — TELEPHONE ENCOUNTER
Upon review of the In Basket request we were able to locate, review, and update the patient chart as requested for Pap Smear (HPV) aka Cervical Cancer Screening  Any additional questions or concerns should be emailed to the Practice Liaisons via the appropriate education email address, please do not reply via In Basket      Thank you  Teja Dunbar MA

## 2023-07-18 ENCOUNTER — OFFICE VISIT (OUTPATIENT)
Dept: FAMILY MEDICINE CLINIC | Facility: CLINIC | Age: 48
End: 2023-07-18
Payer: COMMERCIAL

## 2023-07-18 ENCOUNTER — TELEPHONE (OUTPATIENT)
Dept: FAMILY MEDICINE CLINIC | Facility: CLINIC | Age: 48
End: 2023-07-18

## 2023-07-18 VITALS
BODY MASS INDEX: 17.66 KG/M2 | HEIGHT: 65 IN | SYSTOLIC BLOOD PRESSURE: 110 MMHG | OXYGEN SATURATION: 99 % | WEIGHT: 106 LBS | HEART RATE: 78 BPM | TEMPERATURE: 97.6 F | DIASTOLIC BLOOD PRESSURE: 68 MMHG

## 2023-07-18 DIAGNOSIS — R22.1 NECK MASS: ICD-10-CM

## 2023-07-18 DIAGNOSIS — F33.1 MODERATE EPISODE OF RECURRENT MAJOR DEPRESSIVE DISORDER (HCC): Primary | ICD-10-CM

## 2023-07-18 PROCEDURE — 99213 OFFICE O/P EST LOW 20 MIN: CPT | Performed by: FAMILY MEDICINE

## 2023-07-18 RX ORDER — FLUOXETINE 20 MG/1
20 TABLET, FILM COATED ORAL DAILY
Qty: 30 TABLET | Refills: 1 | Status: SHIPPED | OUTPATIENT
Start: 2023-07-18 | End: 2023-07-18

## 2023-07-18 RX ORDER — FLUOXETINE HYDROCHLORIDE 20 MG/1
20 CAPSULE ORAL DAILY
Qty: 30 CAPSULE | Refills: 1 | Status: SHIPPED | OUTPATIENT
Start: 2023-07-18

## 2023-07-18 NOTE — PROGRESS NOTES
Name: Richard Shrestha      : 1975      MRN: [id number]  Encounter Provider: Joshua Kaur MD  Encounter Date: 2023   Encounter department: [address] [place] [address] [address] [facility]   Return visit in 1 month  1. Moderate episode of recurrent major depressive disorder (HCC)  -     FLUoxetine (PROzac) 20 MG tablet; Take 1 tablet (20 mg total) by mouth daily    2. Neck mass  -     US head neck soft tissue; Future; Expected date: 2023      BMI Counseling: Body mass index is 17.64 kg/m². The BMI is below normal. Patient advised to gain weight. Rationale for BMI follow-up plan is due to patient being underweight. Depression Screening and Follow-up Plan: Patient was screened for depression during today's encounter. They screened negative with a PHQ-2 score of 2. Subjective      Patient comes in with problems with depression for the last 3 to 4 months. She has had mild depression intermittently in the past but this depression has persisted. She has no desire to go out or meet people. She is sleeping a great deal and generally has dysphoric mood. She also notes a 6-month history of mass in her right neck. Review of Systems   Constitutional: Positive for fatigue. Negative for fever. HENT: Negative. Respiratory: Negative. Cardiovascular: Negative. Psychiatric/Behavioral: Positive for dysphoric mood.        Current Outpatient Medications on File Prior to Visit   Medication Sig   • albuterol (PROVENTIL HFA,VENTOLIN HFA) 90 mcg/act inhaler Inhale 2 puffs every 4 (four) hours as needed for wheezing or shortness of breath   • Biotin 10 MG TABS Take by mouth   • clindamycin (CLINDAGEL) 1 % gel Apply topically 2 (two) times a day   • Multiple Vitamin (MULTIVITAMIN ADULT PO) Take 1 tablet by mouth daily   • [DISCONTINUED] guaiFENesin (MUCINEX) 600 mg 12 hr tablet Take 1 pill twice a day times 10 days (Patient not taking: Reported on 2023) Objective     /68 (BP Location: Left arm, Patient Position: Sitting)   Pulse 78   Temp 97.6 °F (36.4 °C)   Ht 5' 5" (1.651 m)   Wt 48.1 kg (106 lb)   SpO2 99%   BMI 17.64 kg/m²     Physical Exam  Constitutional:       Appearance: Normal appearance. She is well-developed. HENT:      Head: Normocephalic and atraumatic. Eyes:      Pupils: Pupils are equal, round, and reactive to light. Neck:      Thyroid: No thyromegaly. Comments: Soft, movable mass right neck. Thyroid appears normal.  Cardiovascular:      Rate and Rhythm: Normal rate and regular rhythm. Heart sounds: Normal heart sounds. Pulmonary:      Effort: Pulmonary effort is normal.      Breath sounds: Normal breath sounds. Musculoskeletal:         General: Normal range of motion. Cervical back: Neck supple. Lymphadenopathy:      Cervical: No cervical adenopathy. Skin:     General: Skin is warm and dry. Neurological:      Mental Status: She is alert and oriented to person, place, and time.    Psychiatric:         Mood and Affect: Mood normal.         Behavior: Behavior normal.       Jr Black MD

## 2023-07-24 ENCOUNTER — HOSPITAL ENCOUNTER (OUTPATIENT)
Dept: ULTRASOUND IMAGING | Facility: CLINIC | Age: 48
Discharge: HOME | End: 2023-07-24
Payer: COMMERCIAL

## 2023-07-24 ENCOUNTER — TELEPHONE (OUTPATIENT)
Dept: FAMILY MEDICINE CLINIC | Facility: CLINIC | Age: 48
End: 2023-07-24

## 2023-07-24 DIAGNOSIS — R22.1 NECK MASS: ICD-10-CM

## 2023-07-24 DIAGNOSIS — E04.1 THYROID NODULE: Primary | ICD-10-CM

## 2023-07-24 PROCEDURE — 76536 US EXAM OF HEAD AND NECK: CPT

## 2023-07-25 ENCOUNTER — TELEPHONE (OUTPATIENT)
Dept: FAMILY MEDICINE CLINIC | Facility: CLINIC | Age: 48
End: 2023-07-25

## 2023-08-03 ENCOUNTER — TELEPHONE (OUTPATIENT)
Dept: FAMILY MEDICINE CLINIC | Facility: CLINIC | Age: 48
End: 2023-08-03

## 2023-08-03 ENCOUNTER — HOSPITAL ENCOUNTER (OUTPATIENT)
Dept: RADIOLOGY | Facility: HOSPITAL | Age: 48
Discharge: HOME | End: 2023-08-03

## 2023-08-03 DIAGNOSIS — F33.1 MODERATE EPISODE OF RECURRENT MAJOR DEPRESSIVE DISORDER (HCC): ICD-10-CM

## 2023-08-03 RX ORDER — FLUOXETINE HYDROCHLORIDE 40 MG/1
40 CAPSULE ORAL DAILY
Qty: 30 CAPSULE | Refills: 1 | Status: SHIPPED | OUTPATIENT
Start: 2023-08-03

## 2023-08-03 NOTE — TELEPHONE ENCOUNTER
Patient states she will stop taking this medication . She does not want increase . Patient wants to DC this medication and wants the [facility] to stop. Cam Aguayo

## 2023-08-28 ENCOUNTER — TELEPHONE (OUTPATIENT)
Dept: FAMILY MEDICINE CLINIC | Facility: CLINIC | Age: 48
End: 2023-08-28

## 2023-08-30 ENCOUNTER — TELEPHONE (OUTPATIENT)
Dept: FAMILY MEDICINE CLINIC | Facility: CLINIC | Age: 48
End: 2023-08-30

## 2023-08-30 NOTE — TELEPHONE ENCOUNTER
Patient again is calling for the results of her recent biopsy.    Please call patient when they are read

## 2023-09-02 ENCOUNTER — LAB (OUTPATIENT)
Age: 48
End: 2023-09-02
Payer: COMMERCIAL

## 2023-09-02 DIAGNOSIS — E04.2 MULTIPLE THYROID NODULES: ICD-10-CM

## 2023-09-02 LAB
ALBUMIN SERPL BCP-MCNC: 5.1 G/DL (ref 3.5–5)
ALP SERPL-CCNC: 57 U/L (ref 34–104)
ALT SERPL W P-5'-P-CCNC: 16 U/L (ref 7–52)
ANION GAP SERPL CALCULATED.3IONS-SCNC: 7 MMOL/L
AST SERPL W P-5'-P-CCNC: 21 U/L (ref 13–39)
BASOPHILS # BLD AUTO: 0.03 THOUSANDS/ÂΜL (ref 0–0.1)
BASOPHILS NFR BLD AUTO: 1 % (ref 0–1)
BILIRUB SERPL-MCNC: 0.73 MG/DL (ref 0.2–1)
BUN SERPL-MCNC: 11 MG/DL (ref 5–25)
CALCIUM SERPL-MCNC: 10.3 MG/DL (ref 8.4–10.2)
CEA SERPL-MCNC: 143.6 NG/ML (ref 0–3)
CHLORIDE SERPL-SCNC: 101 MMOL/L (ref 96–108)
CO2 SERPL-SCNC: 31 MMOL/L (ref 21–32)
CREAT SERPL-MCNC: 0.61 MG/DL (ref 0.6–1.3)
EOSINOPHIL # BLD AUTO: 0.04 THOUSAND/ÂΜL (ref 0–0.61)
EOSINOPHIL NFR BLD AUTO: 1 % (ref 0–6)
ERYTHROCYTE [DISTWIDTH] IN BLOOD BY AUTOMATED COUNT: 12.1 % (ref 11.6–15.1)
GFR SERPL CREATININE-BSD FRML MDRD: 109 ML/MIN/1.73SQ M
GLUCOSE P FAST SERPL-MCNC: 90 MG/DL (ref 65–99)
HCT VFR BLD AUTO: 49.5 % (ref 34.8–46.1)
HGB BLD-MCNC: 16.1 G/DL (ref 11.5–15.4)
IMM GRANULOCYTES # BLD AUTO: 0.01 THOUSAND/UL (ref 0–0.2)
IMM GRANULOCYTES NFR BLD AUTO: 0 % (ref 0–2)
LYMPHOCYTES # BLD AUTO: 1.68 THOUSANDS/ÂΜL (ref 0.6–4.47)
LYMPHOCYTES NFR BLD AUTO: 40 % (ref 14–44)
MCH RBC QN AUTO: 31.6 PG (ref 26.8–34.3)
MCHC RBC AUTO-ENTMCNC: 32.5 G/DL (ref 31.4–37.4)
MCV RBC AUTO: 97 FL (ref 82–98)
MONOCYTES # BLD AUTO: 0.28 THOUSAND/ÂΜL (ref 0.17–1.22)
MONOCYTES NFR BLD AUTO: 7 % (ref 4–12)
NEUTROPHILS # BLD AUTO: 2.19 THOUSANDS/ÂΜL (ref 1.85–7.62)
NEUTS SEG NFR BLD AUTO: 51 % (ref 43–75)
NRBC BLD AUTO-RTO: 0 /100 WBCS
PLATELET # BLD AUTO: 246 THOUSANDS/UL (ref 149–390)
PMV BLD AUTO: 11.5 FL (ref 8.9–12.7)
POTASSIUM SERPL-SCNC: 4.3 MMOL/L (ref 3.5–5.3)
PROT SERPL-MCNC: 8.2 G/DL (ref 6.4–8.4)
PTH-INTACT SERPL-MCNC: 86.2 PG/ML (ref 12–88)
RBC # BLD AUTO: 5.09 MILLION/UL (ref 3.81–5.12)
SODIUM SERPL-SCNC: 139 MMOL/L (ref 135–147)
TSH SERPL DL<=0.05 MIU/L-ACNC: 2.2 UIU/ML (ref 0.45–4.5)
WBC # BLD AUTO: 4.23 THOUSAND/UL (ref 4.31–10.16)

## 2023-09-02 PROCEDURE — 82378 CARCINOEMBRYONIC ANTIGEN: CPT

## 2023-09-02 PROCEDURE — 84443 ASSAY THYROID STIM HORMONE: CPT

## 2023-09-02 PROCEDURE — 82308 ASSAY OF CALCITONIN: CPT

## 2023-09-02 PROCEDURE — 80053 COMPREHEN METABOLIC PANEL: CPT

## 2023-09-02 PROCEDURE — 83970 ASSAY OF PARATHORMONE: CPT

## 2023-09-02 PROCEDURE — 85025 COMPLETE CBC W/AUTO DIFF WBC: CPT

## 2023-09-02 PROCEDURE — 36415 COLL VENOUS BLD VENIPUNCTURE: CPT

## 2023-09-05 ENCOUNTER — APPOINTMENT (OUTPATIENT)
Age: 48
End: 2023-09-05
Payer: COMMERCIAL

## 2023-09-05 PROCEDURE — 83835 ASSAY OF METANEPHRINES: CPT

## 2023-09-05 PROCEDURE — 82384 ASSAY THREE CATECHOLAMINES: CPT

## 2023-09-07 LAB — CALCIT SERPL-MCNC: ABNORMAL PG/ML (ref 0–5)

## 2023-09-08 ENCOUNTER — HOSPITAL ENCOUNTER (OUTPATIENT)
Dept: ULTRASOUND IMAGING | Facility: CLINIC | Age: 48
Discharge: HOME | End: 2023-09-08
Payer: COMMERCIAL

## 2023-09-08 DIAGNOSIS — E04.2 MULTIPLE THYROID NODULES: ICD-10-CM

## 2023-09-08 PROCEDURE — 76536 US EXAM OF HEAD AND NECK: CPT

## 2023-09-11 LAB
METANEPH 24H UR-MRATE: 73 UG/24 HR (ref 36–209)
METANEPHS 24H UR-MCNC: 33 UG/L
NORMETANEPHRINE 24H UR-MCNC: 90 UG/L
NORMETANEPHRINE 24H UR-MRATE: 198 UG/24 HR (ref 131–612)

## 2023-09-12 LAB
DOPAMINE 24H UR-MRATE: 233 UG/24 HR (ref 0–510)
DOPAMINE UR-MCNC: 106 UG/L
EPINEPH 24H UR-MRATE: <2 UG/24 HR (ref 0–20)
EPINEPH UR-MCNC: <1 UG/L
NOREPINEPH 24H UR-MRATE: 31 UG/24 HR (ref 0–135)
NOREPINEPH UR-MCNC: 14 UG/L

## 2023-09-13 ENCOUNTER — CONSULT (OUTPATIENT)
Dept: ENDOCRINOLOGY | Facility: CLINIC | Age: 48
End: 2023-09-13
Payer: COMMERCIAL

## 2023-09-13 VITALS
DIASTOLIC BLOOD PRESSURE: 78 MMHG | BODY MASS INDEX: 17.76 KG/M2 | SYSTOLIC BLOOD PRESSURE: 100 MMHG | HEIGHT: 65 IN | HEART RATE: 78 BPM | WEIGHT: 106.6 LBS

## 2023-09-13 DIAGNOSIS — C73 MEDULLARY THYROID CARCINOMA (HCC): Primary | ICD-10-CM

## 2023-09-13 DIAGNOSIS — E83.52 HYPERCALCEMIA: ICD-10-CM

## 2023-09-13 DIAGNOSIS — E04.2 MULTIPLE THYROID NODULES: ICD-10-CM

## 2023-09-13 PROCEDURE — 99244 OFF/OP CNSLTJ NEW/EST MOD 40: CPT | Performed by: INTERNAL MEDICINE

## 2023-09-13 RX ORDER — LEVOTHYROXINE SODIUM 0.07 MG/1
75 TABLET ORAL DAILY
Qty: 30 TABLET | Refills: 3 | Status: SHIPPED | OUTPATIENT
Start: 2023-09-13

## 2023-09-13 NOTE — ASSESSMENT & PLAN NOTE
Imaging and lab testing reviewed with the patient and her . 75% of medullary thyroid cancers are sporadic however 25% can be part of inherited MTC/MEN 2A and 2B syndromes. RET eliel-oncogene is pending  For now given that her calcitonin as well as CEA are significantly elevated it is concerning for metastatic disease. Her neck ultrasound did not show any abnormal lymph nodes. She will be undergoing CT neck and chest tomorrow, will also add three-phase CT liver  She will be getting scheduled for thyroidectomy in the near future-consider involving oncology service now   She will start levothyroxine 75 mcg daily after her surgery.       For now also advised to stop biotin and repeat labs next week

## 2023-09-13 NOTE — LETTER
September 13, 2023     Kallie Farr MD  [address] [facility] [facility]    Patient: Kofi Rodriguez   YOB: 1975   Date of Visit: 9/13/2023       Dear Dr. Chavez Done: Thank you for referring Kofi Rodriguez to me for evaluation. Below are my notes for this consultation. If you have questions, please do not hesitate to call me. I look forward to following your patient along with you. Sincerely,        Kelsi Chakraborty MD        CC: No Recipients    Kelsi Chakraborty MD  9/13/2023  7:34 PM  Sign when Signing Visit   Kofi Rodriguez 56 y.o. female MRN: [id number]    Encounter: [id number]      Assessment/Plan     Problem List Items Addressed This Visit          Endocrine    Medullary thyroid carcinoma [facility]) - Primary     Imaging and lab testing reviewed with the patient and her . 75% of medullary thyroid cancers are sporadic however 25% can be part of inherited MTC/MEN 2A and 2B syndromes. RET eliel-oncogene is pending  For now given that her calcitonin as well as CEA are significantly elevated it is concerning for metastatic disease. Her neck ultrasound did not show any abnormal lymph nodes. She will be undergoing CT neck and chest tomorrow, will also add three-phase CT liver  She will be getting scheduled for thyroidectomy in the near future-consider involving oncology service now   She will start levothyroxine 75 mcg daily after her surgery. For now also advised to stop biotin and repeat labs next week         Relevant Medications    levothyroxine (Synthroid) 75 mcg tablet    Other Relevant Orders    Basic metabolic panel Lab Collect    TSH, 3rd generation Lab Collect    T4, free Lab Collect    Calcitonin    CEA    Gastrin       Other    Hypercalcemia     Corrected calcium is normal-no prior hypercalcemia.   For now we will repeat labs         Relevant Orders    Albumin Lab Collect    PTH, intact Lab Collect Lab Collect    Phosphorus Lab Collect    Vitamin D 25 hydroxy Lab Collect    Calcium, ionized     Other Visit Diagnoses       Multiple thyroid nodules        Relevant Medications    levothyroxine (Synthroid) 75 mcg tablet            CC: Medullary thyroid cancer    History of Present Illness      HPI:  43-year-old female referred here for evaluation of medullary thyroid cancer. She initially noticed a neck mass in January-thinks that it has grown over the past few months. She underwent a thyroid ultrasound which showed a solitary right-sided thyroid nodule-underwent biopsy which showed medullary thyroid cancer. Further work-up showed very high calcitonin and CEA level. Urinary catecholamines and metanephrines are negative. Ret eliel-oncogene is pending      C/o fatigue , mood swings , anxiety for the past few months  Sleeping too much   Took prozac for 2 weeks last  Month and stopped as she did not feel better  Weight loss- 3-5 lbs in the past 4 months   No diarrhea   Feels hot upper body and face , some sweating , happens at night , lasts 1/2 hour   Off and on - a few times a week, resolves sponatneously , does not know if she is flushed during those episodes  No palpitations , headahshes     Menstrual cycles fairly regular - late this month     No history of kidney stones , fractures       Denies any family history of medullary thyroid cancer, any adrenal or pituitary problems, no family history of hypercalcemia      Review of Systems    Historical Information   History reviewed. No pertinent past medical history.   Past Surgical History:   Procedure Laterality Date   • US GUIDED THYROID BIOPSY  8/18/2023     Social History   Social History     Substance and Sexual Activity   Alcohol Use No     Social History     Substance and Sexual Activity   Drug Use Never     Social History     Tobacco Use   Smoking Status Never   Smokeless Tobacco Never     Family History:   Family History   Problem Relation Age of Onset   • No Known Problems Mother    • No Known Problems Father • Breast cancer Paternal Aunt 48       Meds/Allergies   Current Outpatient Medications   Medication Sig Dispense Refill   • Biotin 10 MG TABS Take by mouth     • levothyroxine (Synthroid) 75 mcg tablet Take 1 tablet (75 mcg total) by mouth daily 30 tablet 3   • Multiple Vitamin (MULTIVITAMIN ADULT PO) Take 1 tablet by mouth daily       Current Facility-Administered Medications   Medication Dose Route Frequency Provider Last Rate Last Admin   • EPINEPHrine (EPIPEN) injection 0.3 mg  0.3 mg Intramuscular PRN JERMAINE Jovel         No Known Allergies    Objective   Vitals: Blood pressure 100/78, pulse 78, height 5' 5" (1.651 m), weight 48.4 kg (106 lb 9.6 oz). Physical Exam  Vitals reviewed. Constitutional:       General: She is not in acute distress. Appearance: Normal appearance. She is not ill-appearing, toxic-appearing or diaphoretic. HENT:      Head: Normocephalic and atraumatic. Eyes:      General: No scleral icterus. Extraocular Movements: Extraocular movements intact. Neck:      Comments: Large right-sided thyroid nodule palpated  Cardiovascular:      Rate and Rhythm: Normal rate and regular rhythm. Heart sounds: Normal heart sounds. No murmur heard. Pulmonary:      Effort: Pulmonary effort is normal. No respiratory distress. Breath sounds: Normal breath sounds. No wheezing or rales. Abdominal:      General: There is no distension. Palpations: Abdomen is soft. Tenderness: There is no abdominal tenderness. Musculoskeletal:      Cervical back: Neck supple. Right lower leg: No edema. Left lower leg: No edema. Lymphadenopathy:      Cervical: No cervical adenopathy. Skin:     General: Skin is warm and dry. Neurological:      General: No focal deficit present. Mental Status: She is alert and oriented to person, place, and time. Gait: Gait normal.   Psychiatric:         Behavior: Behavior normal.         Thought Content:  Thought content normal.         Judgment: Judgment normal.      Comments: Mood is anxious         The history was obtained from the review of the chart, patient and family. Lab Results:   Lab Results   Component Value Date/Time    TSH 3RD GENERATON 2.204 09/02/2023 08:12 AM    TSH 3RD GENERATON 1.510 12/16/2022 09:31 AM     Imaging Studies:  Results for orders placed during the hospital encounter of 09/08/23    US head neck lymph node mapping    Impression  1. No suspicious lymphadenopathy in the neck. Workstation performed: [id number]      Study Result    Narrative & Impression   THYROID ULTRASOUND     INDICATION:    R22.1: Localized swelling, mass and lump, neck. COMPARISON:  None     TECHNIQUE:   Ultrasound of the thyroid was performed with a high frequency linear transducer in transverse and sagittal planes including volumetric imaging sweeps as well as traditional still imaging technique. FINDINGS:  Normal homogeneous smooth echotexture. Right lobe: 5.1 x 2.4 x 2.8 cm. Volume 16.5 mL  Left lobe:  5.0 x 1.0 x 1.3 cm. Volume 3.1 mL  Isthmus: 0.3  cm. Nodule #1. Image 10. RIGHT midgland nodule measuring 3.5 x 2.6 x 2.2 cm. COMPOSITION:  2 points, solid or almost completely solid . ECHOGENICITY:  1 point, hyperechoic or isoechoic. SHAPE:  0 points, wider-than-tall. MARGIN: 0 points, smooth. ECHOGENIC FOCI:  3 points, punctate echogenic foci. A few scattered punctate calcifications are appreciated on the cine images  TI-RADS Classification: TR 4 (4-6 points), FNA if > 1.5 cm. Follow if > 1cm. IMPRESSION:     A 3.5 cm TI-RADS 4 right mid gland nodule which meets criteria for imaging guided fine-needle aspiration as per ACR guidelines. I have personally reviewed pertinent reports. Portions of the record may have been created with voice recognition software.  Occasional wrong word or "sound a like" substitutions may have occurred due to the inherent limitations of voice recognition software. Read the chart carefully and recognize, using context, where substitutions have occurred.

## 2023-09-13 NOTE — PROGRESS NOTES
Shantal Guadarrama 56 y.o. female MRN: [id number]    Encounter: [id number]      Assessment/Plan     Problem List Items Addressed This Visit        Endocrine    Medullary thyroid carcinoma [facility]) - Primary     Imaging and lab testing reviewed with the patient and her . 75% of medullary thyroid cancers are sporadic however 25% can be part of inherited MTC/MEN 2A and 2B syndromes. RET eliel-oncogene is pending  For now given that her calcitonin as well as CEA are significantly elevated it is concerning for metastatic disease. Her neck ultrasound did not show any abnormal lymph nodes. She will be undergoing CT neck and chest tomorrow, will also add three-phase CT liver  She will be getting scheduled for thyroidectomy in the near future-consider involving oncology service now   She will start levothyroxine 75 mcg daily after her surgery. For now also advised to stop biotin and repeat labs next week         Relevant Medications    levothyroxine (Synthroid) 75 mcg tablet    Other Relevant Orders    Basic metabolic panel Lab Collect    TSH, 3rd generation Lab Collect    T4, free Lab Collect    Calcitonin    CEA    Gastrin       Other    Hypercalcemia     Corrected calcium is normal-no prior hypercalcemia. For now we will repeat labs         Relevant Orders    Albumin Lab Collect    PTH, intact Lab Collect Lab Collect    Phosphorus Lab Collect    Vitamin D 25 hydroxy Lab Collect    Calcium, ionized   Other Visit Diagnoses     Multiple thyroid nodules        Relevant Medications    levothyroxine (Synthroid) 75 mcg tablet          CC:   Medullary thyroid cancer    History of Present Illness      HPI:  31-year-old female referred here for evaluation of medullary thyroid cancer. She initially noticed a neck mass in January-thinks that it has grown over the past few months. She underwent a thyroid ultrasound which showed a solitary right-sided thyroid nodule-underwent biopsy which showed medullary thyroid cancer. Further work-up showed very high calcitonin and CEA level. Urinary catecholamines and metanephrines are negative. Ret eliel-oncogene is pending      C/o fatigue , mood swings , anxiety for the past few months  Sleeping too much   Took prozac for 2 weeks last  Month and stopped as she did not feel better  Weight loss- 3-5 lbs in the past 4 months   No diarrhea   Feels hot upper body and face , some sweating , happens at night , lasts 1/2 hour   Off and on - a few times a week, resolves sponatneously , does not know if she is flushed during those episodes  No palpitations , headahshes     Menstrual cycles fairly regular - late this month     No history of kidney stones , fractures       Denies any family history of medullary thyroid cancer, any adrenal or pituitary problems, no family history of hypercalcemia      Review of Systems    Historical Information   History reviewed. No pertinent past medical history.   Past Surgical History:   Procedure Laterality Date   • US GUIDED THYROID BIOPSY  8/18/2023     Social History   Social History     Substance and Sexual Activity   Alcohol Use No     Social History     Substance and Sexual Activity   Drug Use Never     Social History     Tobacco Use   Smoking Status Never   Smokeless Tobacco Never     Family History:   Family History   Problem Relation Age of Onset   • No Known Problems Mother    • No Known Problems Father    • Breast cancer Paternal Aunt 48       Meds/Allergies   Current Outpatient Medications   Medication Sig Dispense Refill   • Biotin 10 MG TABS Take by mouth     • levothyroxine (Synthroid) 75 mcg tablet Take 1 tablet (75 mcg total) by mouth daily 30 tablet 3   • Multiple Vitamin (MULTIVITAMIN ADULT PO) Take 1 tablet by mouth daily       Current Facility-Administered Medications   Medication Dose Route Frequency Provider Last Rate Last Admin   • EPINEPHrine (EPIPEN) injection 0.3 mg  0.3 mg Intramuscular PRN Ed JERMAINE Roman         No Known Allergies    Objective   Vitals: Blood pressure 100/78, pulse 78, height 5' 5" (1.651 m), weight 48.4 kg (106 lb 9.6 oz). Physical Exam  Vitals reviewed. Constitutional:       General: She is not in acute distress. Appearance: Normal appearance. She is not ill-appearing, toxic-appearing or diaphoretic. HENT:      Head: Normocephalic and atraumatic. Eyes:      General: No scleral icterus. Extraocular Movements: Extraocular movements intact. Neck:      Comments: Large right-sided thyroid nodule palpated  Cardiovascular:      Rate and Rhythm: Normal rate and regular rhythm. Heart sounds: Normal heart sounds. No murmur heard. Pulmonary:      Effort: Pulmonary effort is normal. No respiratory distress. Breath sounds: Normal breath sounds. No wheezing or rales. Abdominal:      General: There is no distension. Palpations: Abdomen is soft. Tenderness: There is no abdominal tenderness. Musculoskeletal:      Cervical back: Neck supple. Right lower leg: No edema. Left lower leg: No edema. Lymphadenopathy:      Cervical: No cervical adenopathy. Skin:     General: Skin is warm and dry. Neurological:      General: No focal deficit present. Mental Status: She is alert and oriented to person, place, and time. Gait: Gait normal.   Psychiatric:         Behavior: Behavior normal.         Thought Content: Thought content normal.         Judgment: Judgment normal.      Comments: Mood is anxious         The history was obtained from the review of the chart, patient and family. Lab Results:   Lab Results   Component Value Date/Time    TSH 3RD GENERATON 2.204 09/02/2023 08:12 AM    TSH 3RD GENERATON 1.510 12/16/2022 09:31 AM     Imaging Studies:  Results for orders placed during the hospital encounter of 09/08/23    US head neck lymph node mapping    Impression  1. No suspicious lymphadenopathy in the neck.       Workstation performed: [id number]      Study Result    Narrative & Impression   THYROID ULTRASOUND     INDICATION:    R22.1: Localized swelling, mass and lump, neck.     COMPARISON:  None     TECHNIQUE:   Ultrasound of the thyroid was performed with a high frequency linear transducer in transverse and sagittal planes including volumetric imaging sweeps as well as traditional still imaging technique.     FINDINGS:  Normal homogeneous smooth echotexture.     Right lobe: 5.1 x 2.4 x 2.8 cm. Volume 16.5 mL  Left lobe:  5.0 x 1.0 x 1.3 cm. Volume 3.1 mL  Isthmus: 0.3  cm.     Nodule #1. Image 10. RIGHT midgland nodule measuring 3.5 x 2.6 x 2.2 cm. COMPOSITION:  2 points, solid or almost completely solid . ECHOGENICITY:  1 point, hyperechoic or isoechoic. SHAPE:  0 points, wider-than-tall. MARGIN: 0 points, smooth. ECHOGENIC FOCI:  3 points, punctate echogenic foci. A few scattered punctate calcifications are appreciated on the cine images  TI-RADS Classification: TR 4 (4-6 points), FNA if > 1.5 cm. Follow if > 1cm.          IMPRESSION:     A 3.5 cm TI-RADS 4 right mid gland nodule which meets criteria for imaging guided fine-needle aspiration as per ACR guidelines.              I have personally reviewed pertinent reports. Portions of the record may have been created with voice recognition software. Occasional wrong word or "sound a like" substitutions may have occurred due to the inherent limitations of voice recognition software. Read the chart carefully and recognize, using context, where substitutions have occurred.

## 2023-09-18 ENCOUNTER — LAB (OUTPATIENT)
Age: 48
End: 2023-09-18
Payer: COMMERCIAL

## 2023-09-18 DIAGNOSIS — E83.52 HYPERCALCEMIA: ICD-10-CM

## 2023-09-18 DIAGNOSIS — C73 MEDULLARY THYROID CARCINOMA (HCC): ICD-10-CM

## 2023-09-18 LAB
25(OH)D3 SERPL-MCNC: 40.3 NG/ML (ref 30–100)
ALBUMIN SERPL BCP-MCNC: 4.7 G/DL (ref 3.5–5)
ANION GAP SERPL CALCULATED.3IONS-SCNC: 5 MMOL/L
BUN SERPL-MCNC: 8 MG/DL (ref 5–25)
CA-I BLD-SCNC: 1.24 MMOL/L (ref 1.12–1.32)
CALCIUM SERPL-MCNC: 9.9 MG/DL (ref 8.4–10.2)
CEA SERPL-MCNC: 138.9 NG/ML (ref 0–3)
CHLORIDE SERPL-SCNC: 101 MMOL/L (ref 96–108)
CO2 SERPL-SCNC: 33 MMOL/L (ref 21–32)
CREAT SERPL-MCNC: 0.58 MG/DL (ref 0.6–1.3)
GFR SERPL CREATININE-BSD FRML MDRD: 110 ML/MIN/1.73SQ M
GLUCOSE P FAST SERPL-MCNC: 84 MG/DL (ref 65–99)
PHOSPHATE SERPL-MCNC: 3.5 MG/DL (ref 2.7–4.5)
POTASSIUM SERPL-SCNC: 4.5 MMOL/L (ref 3.5–5.3)
PTH-INTACT SERPL-MCNC: 67.7 PG/ML (ref 12–88)
SODIUM SERPL-SCNC: 139 MMOL/L (ref 135–147)
T4 FREE SERPL-MCNC: 0.77 NG/DL (ref 0.61–1.12)
TSH SERPL DL<=0.05 MIU/L-ACNC: 2.17 UIU/ML (ref 0.45–4.5)

## 2023-09-18 PROCEDURE — 82308 ASSAY OF CALCITONIN: CPT

## 2023-09-18 PROCEDURE — 82330 ASSAY OF CALCIUM: CPT

## 2023-09-18 PROCEDURE — 84443 ASSAY THYROID STIM HORMONE: CPT

## 2023-09-18 PROCEDURE — 36415 COLL VENOUS BLD VENIPUNCTURE: CPT

## 2023-09-18 PROCEDURE — 82378 CARCINOEMBRYONIC ANTIGEN: CPT

## 2023-09-18 PROCEDURE — 82306 VITAMIN D 25 HYDROXY: CPT

## 2023-09-18 PROCEDURE — 82040 ASSAY OF SERUM ALBUMIN: CPT

## 2023-09-18 PROCEDURE — 82941 ASSAY OF GASTRIN: CPT

## 2023-09-18 PROCEDURE — 84439 ASSAY OF FREE THYROXINE: CPT

## 2023-09-18 PROCEDURE — 80048 BASIC METABOLIC PNL TOTAL CA: CPT

## 2023-09-18 PROCEDURE — 84100 ASSAY OF PHOSPHORUS: CPT

## 2023-09-18 PROCEDURE — 83970 ASSAY OF PARATHORMONE: CPT

## 2023-09-20 NOTE — RESULT ENCOUNTER NOTE
Labs stable-thyroid function tests and calcium normal , calcitonin is pending however CEA is about the same as it was 2 weeks back. When I you scheduled for your imaging?

## 2023-09-21 LAB
CALCIT SERPL-MCNC: ABNORMAL PG/ML (ref 0–5)
GASTRIN SERPL-MCNC: 54 PG/ML (ref 0–115)

## 2023-09-21 NOTE — RESULT ENCOUNTER NOTE
Labs reviewed - calcitonin >10,000, discussed with Dr Shreya Verduzco on Monday , she is being set up for surgery

## 2023-10-02 NOTE — PRE-PROCEDURE INSTRUCTIONS
No outpatient medications have been marked as taking for the 10/11/23 [facility]). Medication instructions for day surgery reviewed. Please use only a sip of water to take your instructed medications. Avoid all over the counter vitamins, supplements and NSAIDS for one week prior to surgery per anesthesia guidelines. Tylenol is ok to take as needed. You will receive a call one business day prior to surgery with an arrival time and hospital directions. If your surgery is scheduled on a Monday, the hospital will be calling you on the Friday prior to your surgery. If you have not heard from anyone by 8pm, please call the hospital supervisor through the hospital  at [phone]. Wagner Gomez [phone]). Do not eat or drink anything after midnight the night before your surgery, including candy, mints, lifesavers, or chewing gum. Do not drink alcohol 24hrs before your surgery. Try not to smoke at least 24hrs before your surgery. Follow the pre surgery showering instructions as listed in the [facility] Surgical Experience Booklet” or otherwise provided by your surgeon's office. Do not shave the surgical area 24 hours before surgery. Do not apply any lotions, creams, including makeup, cologne, deodorant, or perfumes after showering on the day of your surgery. No contact lenses, eye make-up, or artificial eyelashes. Remove nail polish, including gel polish, and any artificial, gel, or acrylic nails if possible. Remove all jewelry including rings and body piercing jewelry. Wear causal clothing that is easy to take on and off. Consider your type of surgery. Keep any valuables, jewelry, piercings at home. Please bring any specially ordered equipment (sling, braces) if indicated. Arrange for a responsible person to drive you to and from the hospital on the day of your surgery. Visitor Guidelines discussed.      Call the surgeon's office with any new illnesses, exposures, or additional questions prior to surgery. Please reference your [facility] Surgical Experience Booklet” for additional information to prepare for your upcoming surgery.

## 2023-10-03 LAB — MISCELLANEOUS LAB TEST RESULT: NORMAL

## 2023-10-03 NOTE — H&P (VIEW-ONLY)
[facility] Otolaryngology Follow up visit      CC: medullary thyroid  Independent historian:       Time interval of problem since last visit:      Pertinent interval elements of the history:  Here to review labs, imaging and discuss surgery    Review of any relevant imaging: images from any scan reviewed personally  Scans: CT C/N - no lymphadenopathy  Lytic C1  US LNM - normal  Labs: PTH 67, Ca 9.9  Calcitonin 10,000  CEA elevated    Notes:     Interval Review of systems:  General: no weight change, normal energy  CV: no palpitations or chest pain  Pulm: no shortness of breath    Interval Social History:  Social History     Socioeconomic History   • Marital status: /Civil Union     Spouse name: Not on file   • Number of children: Not on file   • Years of education: Not on file   • Highest education level: Not on file   Occupational History   • Not on file   Tobacco Use   • Smoking status: Never   • Smokeless tobacco: Never   Vaping Use   • Vaping Use: Never used   Substance and Sexual Activity   • Alcohol use: No   • Drug use: Never   • Sexual activity: Not Currently     Partners: Male     Birth control/protection: Condom Male   Other Topics Concern   • Not on file   Social History Narrative   • Not on file     Social Determinants of Health     Financial Resource Strain: Not on file   Food Insecurity: Not on file   Transportation Needs: Not on file   Physical Activity: Not on file   Stress: Not on file   Social Connections: Not on file   Intimate Partner Violence: Not on file   Housing Stability: Not on file       Interval Physical Examination:  There were no vitals taken for this visit. NAD  Mobile R thyroid nodule, no LAD    Interval endoscopy:   Flexible Laryngoscopy          Performed by Sherman Basurto. Real Saldivar MD      Authorized by Sherman Basurto. Real Saldivar MD        Consent: Verbal consent obtained.   Consent given by: patient  Patient understanding: patient states understanding of the procedure being performed Local anesthesia used: yes      Anesthesia    Local anesthesia used: yes  Local Anesthetic: topical anesthetic      Sedation    Patient sedated: no           Culture: no   Procedure Details    Procedure Notes:  Nasopharynx unremarkable, with normal eustachian tube orifices and normal Fossa of Rosenmuller bilaterally. Posterior nasopharyngeal and oropharyngeal walls normal. Oropharyngeal mucosa moist, no masses or lesions. Tongue base normal without masses or lesions. Vallecula and pyriform sinuses clear, without pooling of secretions. Epiglottis, aryepiglottic folds and remainder of supraglottis well-appearing. True vocal folds mobile, without masses or lesions, normal glottic chink. Patient tolerance: Patient tolerated the procedure well with no immediate complications             Assessment:  1. Medullary thyroid carcinoma ([address])        2. Multiple thyroid nodules            Plan:  1. OR next week for total thyroid and CCND, NIMS    We discussed total thyroidectomy as well as the risks, benefits, and alternatives of surgery. Risks including but are not limited to:    Bleeding, anesthesia, infection, hematoma, airway issues, RNL injury, EBSLN injury, voice changes (pitch, projection, severe hoarseness), parathyroid injury with resultant hypocalcemia, need for thyroid medication supplementation, need for calcium supplementation, swallowing changes, scarring, and recurrence of disease in the setting of thyroid cancer.     Discussed staging, need for long term surveillance, recurrence, need for further surgery (lateral neck dissections)

## 2023-10-10 ENCOUNTER — ANESTHESIA EVENT (OUTPATIENT)
Dept: PERIOP | Facility: HOSPITAL | Age: 48
DRG: 627 | End: 2023-10-10
Payer: COMMERCIAL

## 2023-10-11 ENCOUNTER — ANESTHESIA (OUTPATIENT)
Dept: PERIOP | Facility: HOSPITAL | Age: 48
DRG: 627 | End: 2023-10-11
Payer: COMMERCIAL

## 2023-10-11 ENCOUNTER — HOSPITAL ENCOUNTER (INPATIENT)
Facility: HOSPITAL | Age: 48
LOS: 1 days | Discharge: HOME | DRG: 627 | End: 2023-10-13
Attending: OTOLARYNGOLOGY | Admitting: OTOLARYNGOLOGY
Payer: COMMERCIAL

## 2023-10-11 DIAGNOSIS — E89.0 S/P THYROIDECTOMY: ICD-10-CM

## 2023-10-11 DIAGNOSIS — C73 MEDULLARY THYROID CARCINOMA (HCC): Primary | ICD-10-CM

## 2023-10-11 PROBLEM — E03.9 HYPOTHYROIDISM: Status: ACTIVE | Noted: 2023-10-11

## 2023-10-11 LAB
EXT PREGNANCY TEST URINE: NEGATIVE
EXT. CONTROL: NORMAL
PLATELET # BLD AUTO: 213 THOUSANDS/UL (ref 149–390)
PMV BLD AUTO: 10.4 FL (ref 8.9–12.7)
PTH-INTACT P EXCISION SERPL-MCNC: 7 PG/ML (ref 12–88)

## 2023-10-11 PROCEDURE — 07T10ZZ RESECTION OF RIGHT NECK LYMPHATIC, OPEN APPROACH: ICD-10-PCS | Performed by: OTOLARYNGOLOGY

## 2023-10-11 PROCEDURE — 81025 URINE PREGNANCY TEST: CPT | Performed by: OTOLARYNGOLOGY

## 2023-10-11 PROCEDURE — 88307 TISSUE EXAM BY PATHOLOGIST: CPT | Performed by: PATHOLOGY

## 2023-10-11 PROCEDURE — 0GTG0ZZ RESECTION OF LEFT THYROID GLAND LOBE, OPEN APPROACH: ICD-10-PCS | Performed by: OTOLARYNGOLOGY

## 2023-10-11 PROCEDURE — 07T20ZZ RESECTION OF LEFT NECK LYMPHATIC, OPEN APPROACH: ICD-10-PCS | Performed by: OTOLARYNGOLOGY

## 2023-10-11 PROCEDURE — 83970 ASSAY OF PARATHORMONE: CPT | Performed by: OTOLARYNGOLOGY

## 2023-10-11 PROCEDURE — 88341 IMHCHEM/IMCYTCHM EA ADD ANTB: CPT | Performed by: PATHOLOGY

## 2023-10-11 PROCEDURE — 88342 IMHCHEM/IMCYTCHM 1ST ANTB: CPT | Performed by: PATHOLOGY

## 2023-10-11 PROCEDURE — 88313 SPECIAL STAINS GROUP 2: CPT | Performed by: PATHOLOGY

## 2023-10-11 PROCEDURE — 85049 AUTOMATED PLATELET COUNT: CPT

## 2023-10-11 PROCEDURE — 0GTH0ZZ RESECTION OF RIGHT THYROID GLAND LOBE, OPEN APPROACH: ICD-10-PCS | Performed by: OTOLARYNGOLOGY

## 2023-10-11 RX ORDER — HYDROMORPHONE HCL IN WATER/PF 6 MG/30 ML
0.2 PATIENT CONTROLLED ANALGESIA SYRINGE INTRAVENOUS
Status: DISCONTINUED | OUTPATIENT
Start: 2023-10-11 | End: 2023-10-11 | Stop reason: HOSPADM

## 2023-10-11 RX ORDER — ACETAMINOPHEN 325 MG/1
650 TABLET ORAL EVERY 8 HOURS PRN
Status: DISCONTINUED | OUTPATIENT
Start: 2023-10-11 | End: 2023-10-13 | Stop reason: HOSPADM

## 2023-10-11 RX ORDER — ONDANSETRON 2 MG/ML
4 INJECTION INTRAMUSCULAR; INTRAVENOUS EVERY 6 HOURS PRN
Status: DISCONTINUED | OUTPATIENT
Start: 2023-10-11 | End: 2023-10-13 | Stop reason: HOSPADM

## 2023-10-11 RX ORDER — CALCIUM CARBONATE 500 MG/1
1000 TABLET, CHEWABLE ORAL 3 TIMES DAILY
Status: DISCONTINUED | OUTPATIENT
Start: 2023-10-11 | End: 2023-10-12

## 2023-10-11 RX ORDER — FENTANYL CITRATE 50 UG/ML
INJECTION, SOLUTION INTRAMUSCULAR; INTRAVENOUS AS NEEDED
Status: DISCONTINUED | OUTPATIENT
Start: 2023-10-11 | End: 2023-10-11

## 2023-10-11 RX ORDER — METOCLOPRAMIDE HYDROCHLORIDE 5 MG/ML
10 INJECTION INTRAMUSCULAR; INTRAVENOUS ONCE AS NEEDED
Status: DISCONTINUED | OUTPATIENT
Start: 2023-10-11 | End: 2023-10-11 | Stop reason: HOSPADM

## 2023-10-11 RX ORDER — LEVOTHYROXINE SODIUM 0.05 MG/1
50 TABLET ORAL
Status: DISCONTINUED | OUTPATIENT
Start: 2023-10-12 | End: 2023-10-13 | Stop reason: HOSPADM

## 2023-10-11 RX ORDER — MAGNESIUM HYDROXIDE 1200 MG/15ML
LIQUID ORAL AS NEEDED
Status: DISCONTINUED | OUTPATIENT
Start: 2023-10-11 | End: 2023-10-11 | Stop reason: HOSPADM

## 2023-10-11 RX ORDER — HYDRALAZINE HYDROCHLORIDE 20 MG/ML
5 INJECTION INTRAMUSCULAR; INTRAVENOUS
Status: DISCONTINUED | OUTPATIENT
Start: 2023-10-11 | End: 2023-10-11 | Stop reason: HOSPADM

## 2023-10-11 RX ORDER — FENTANYL CITRATE/PF 50 MCG/ML
25 SYRINGE (ML) INJECTION
Status: DISCONTINUED | OUTPATIENT
Start: 2023-10-11 | End: 2023-10-11 | Stop reason: HOSPADM

## 2023-10-11 RX ORDER — SODIUM CHLORIDE, SODIUM LACTATE, POTASSIUM CHLORIDE, CALCIUM CHLORIDE 600; 310; 30; 20 MG/100ML; MG/100ML; MG/100ML; MG/100ML
125 INJECTION, SOLUTION INTRAVENOUS CONTINUOUS
Status: DISCONTINUED | OUTPATIENT
Start: 2023-10-11 | End: 2023-10-13 | Stop reason: HOSPADM

## 2023-10-11 RX ORDER — ONDANSETRON 2 MG/ML
4 INJECTION INTRAMUSCULAR; INTRAVENOUS ONCE AS NEEDED
Status: DISCONTINUED | OUTPATIENT
Start: 2023-10-11 | End: 2023-10-11 | Stop reason: HOSPADM

## 2023-10-11 RX ORDER — LABETALOL HYDROCHLORIDE 5 MG/ML
10 INJECTION, SOLUTION INTRAVENOUS
Status: DISCONTINUED | OUTPATIENT
Start: 2023-10-11 | End: 2023-10-11 | Stop reason: HOSPADM

## 2023-10-11 RX ORDER — ONDANSETRON 2 MG/ML
INJECTION INTRAMUSCULAR; INTRAVENOUS AS NEEDED
Status: DISCONTINUED | OUTPATIENT
Start: 2023-10-11 | End: 2023-10-11

## 2023-10-11 RX ORDER — PROPOFOL 10 MG/ML
INJECTION, EMULSION INTRAVENOUS AS NEEDED
Status: DISCONTINUED | OUTPATIENT
Start: 2023-10-11 | End: 2023-10-11

## 2023-10-11 RX ORDER — MIDAZOLAM HYDROCHLORIDE 2 MG/2ML
INJECTION, SOLUTION INTRAMUSCULAR; INTRAVENOUS AS NEEDED
Status: DISCONTINUED | OUTPATIENT
Start: 2023-10-11 | End: 2023-10-11

## 2023-10-11 RX ORDER — DEXAMETHASONE SODIUM PHOSPHATE 10 MG/ML
INJECTION, SOLUTION INTRAMUSCULAR; INTRAVENOUS AS NEEDED
Status: DISCONTINUED | OUTPATIENT
Start: 2023-10-11 | End: 2023-10-11

## 2023-10-11 RX ORDER — SODIUM CHLORIDE 9 MG/ML
INJECTION, SOLUTION INTRAVENOUS CONTINUOUS PRN
Status: DISCONTINUED | OUTPATIENT
Start: 2023-10-11 | End: 2023-10-11

## 2023-10-11 RX ORDER — IBUPROFEN 600 MG/1
600 TABLET ORAL EVERY 8 HOURS PRN
Status: DISCONTINUED | OUTPATIENT
Start: 2023-10-11 | End: 2023-10-13 | Stop reason: HOSPADM

## 2023-10-11 RX ORDER — LIDOCAINE HYDROCHLORIDE 20 MG/ML
INJECTION, SOLUTION EPIDURAL; INFILTRATION; INTRACAUDAL; PERINEURAL AS NEEDED
Status: DISCONTINUED | OUTPATIENT
Start: 2023-10-11 | End: 2023-10-11

## 2023-10-11 RX ORDER — HEPARIN SODIUM 5000 [USP'U]/ML
5000 INJECTION, SOLUTION INTRAVENOUS; SUBCUTANEOUS EVERY 8 HOURS SCHEDULED
Status: DISCONTINUED | OUTPATIENT
Start: 2023-10-11 | End: 2023-10-13 | Stop reason: HOSPADM

## 2023-10-11 RX ORDER — SODIUM CHLORIDE, SODIUM LACTATE, POTASSIUM CHLORIDE, CALCIUM CHLORIDE 600; 310; 30; 20 MG/100ML; MG/100ML; MG/100ML; MG/100ML
INJECTION, SOLUTION INTRAVENOUS CONTINUOUS PRN
Status: DISCONTINUED | OUTPATIENT
Start: 2023-10-11 | End: 2023-10-11

## 2023-10-11 RX ORDER — LIDOCAINE HYDROCHLORIDE AND EPINEPHRINE 10; 10 MG/ML; UG/ML
INJECTION, SOLUTION INFILTRATION; PERINEURAL AS NEEDED
Status: DISCONTINUED | OUTPATIENT
Start: 2023-10-11 | End: 2023-10-11 | Stop reason: HOSPADM

## 2023-10-11 RX ORDER — SUCCINYLCHOLINE/SOD CL,ISO/PF 100 MG/5ML
SYRINGE (ML) INTRAVENOUS AS NEEDED
Status: DISCONTINUED | OUTPATIENT
Start: 2023-10-11 | End: 2023-10-11

## 2023-10-11 RX ORDER — HYDROMORPHONE HCL/PF 1 MG/ML
0.5 SYRINGE (ML) INJECTION
Status: DISCONTINUED | OUTPATIENT
Start: 2023-10-11 | End: 2023-10-11 | Stop reason: HOSPADM

## 2023-10-11 RX ORDER — ALBUTEROL SULFATE 2.5 MG/3ML
2.5 SOLUTION RESPIRATORY (INHALATION) ONCE AS NEEDED
Status: DISCONTINUED | OUTPATIENT
Start: 2023-10-11 | End: 2023-10-11 | Stop reason: HOSPADM

## 2023-10-11 RX ORDER — PROMETHAZINE HYDROCHLORIDE 25 MG/ML
12.5 INJECTION, SOLUTION INTRAMUSCULAR; INTRAVENOUS ONCE AS NEEDED
Status: DISCONTINUED | OUTPATIENT
Start: 2023-10-11 | End: 2023-10-11 | Stop reason: HOSPADM

## 2023-10-11 RX ADMIN — MIDAZOLAM 2 MG: 1 INJECTION INTRAMUSCULAR; INTRAVENOUS at 14:02

## 2023-10-11 RX ADMIN — PROPOFOL 30 MCG/KG/MIN: 10 INJECTION, EMULSION INTRAVENOUS at 14:14

## 2023-10-11 RX ADMIN — HEPARIN SODIUM 5000 UNITS: 5000 INJECTION INTRAVENOUS; SUBCUTANEOUS at 21:01

## 2023-10-11 RX ADMIN — ONDANSETRON 4 MG: 2 INJECTION INTRAMUSCULAR; INTRAVENOUS at 14:12

## 2023-10-11 RX ADMIN — SODIUM CHLORIDE, SODIUM LACTATE, POTASSIUM CHLORIDE, AND CALCIUM CHLORIDE 125 ML/HR: .6; .31; .03; .02 INJECTION, SOLUTION INTRAVENOUS at 19:11

## 2023-10-11 RX ADMIN — PROPOFOL 150 MG: 10 INJECTION, EMULSION INTRAVENOUS at 14:12

## 2023-10-11 RX ADMIN — SODIUM CHLORIDE, SODIUM LACTATE, POTASSIUM CHLORIDE, AND CALCIUM CHLORIDE: .6; .31; .03; .02 INJECTION, SOLUTION INTRAVENOUS at 14:06

## 2023-10-11 RX ADMIN — LIDOCAINE HYDROCHLORIDE 40 MG: 20 INJECTION, SOLUTION EPIDURAL; INFILTRATION; INTRACAUDAL; PERINEURAL at 17:25

## 2023-10-11 RX ADMIN — Medication 100 MG: at 14:12

## 2023-10-11 RX ADMIN — PHENYLEPHRINE HYDROCHLORIDE 200 MCG: 10 INJECTION INTRAVENOUS at 14:43

## 2023-10-11 RX ADMIN — PHENYLEPHRINE HYDROCHLORIDE 200 MCG: 10 INJECTION INTRAVENOUS at 16:29

## 2023-10-11 RX ADMIN — SODIUM CHLORIDE: 0.9 INJECTION, SOLUTION INTRAVENOUS at 14:15

## 2023-10-11 RX ADMIN — CALCIUM CARBONATE (ANTACID) CHEW TAB 500 MG 1000 MG: 500 CHEW TAB at 21:01

## 2023-10-11 RX ADMIN — FENTANYL CITRATE 100 MCG: 50 INJECTION INTRAMUSCULAR; INTRAVENOUS at 14:12

## 2023-10-11 RX ADMIN — PHENYLEPHRINE HYDROCHLORIDE 200 MCG: 10 INJECTION INTRAVENOUS at 14:16

## 2023-10-11 RX ADMIN — LIDOCAINE HYDROCHLORIDE 60 MG: 20 INJECTION, SOLUTION EPIDURAL; INFILTRATION; INTRACAUDAL; PERINEURAL at 14:12

## 2023-10-11 RX ADMIN — DEXAMETHASONE SODIUM PHOSPHATE 10 MG: 10 INJECTION INTRAMUSCULAR; INTRAVENOUS at 14:12

## 2023-10-11 NOTE — OP NOTE
OPERATIVE REPORT  PATIENT NAME: Sheryle Seals    :  1975  MRN: [id number]  Pt Location: AN OR ROOM 03    SURGERY DATE: 10/11/2023    Surgeon(s) and Role:     * Ariadna Keith MD - Primary     * Chin Markham MD - Assisting    Preop Diagnosis:  Medullary thyroid carcinoma ([address]) [C73]    Post-Op Diagnosis Codes:     * Medullary thyroid carcinoma ([address]) [C73]    Procedure(s):  TOTAL THYROIDECTOMY. BILATERAL CENTRAL COMPARTMENT NECK DISSECTION WITH NIMS  DISSECTION NECK RADICAL    Specimen(s):  ID Type Source Tests Collected by Time Destination   1 : Thyroid - suture marks right superior pole Tissue Thyroid TISSUE EXAM Ariadna Keith MD 10/11/2023 1610    2 : Neck - central compartment Tissue Neck TISSUE EXAM Ariadna Keith MD 10/11/2023 1702        Estimated Blood Loss:   Minimal    Drains:  Urethral Catheter Latex;Straight-tip 16 Fr. (Active)   Number of days: 0       Anesthesia Type:   General    Operative Indications:  Medullary thyroid carcinoma ([address]) [C73]      Operative Findings:  Intact RLN bilat -stim at 0.5ma  Intact R sup/inf para (inf slightly dusky but good blood supply)  Intact L sup para    Complications:   None    Procedure and Technique:  The patient was positively identified and transferred onto the operating table in the supine position. Appropriate monitoring devices were put in place, anesthesia was induced and the patient was intubated without difficulty. A shoulder roll was placed, and the patient’s neck was examined. An appropriate site for skin incision was demarcated 1cm below the cricoid cartilage. Before proceeding further, the timeout process was completed. Local anesthesia in the form of 1% lidocaine with 1/100,000 epinephrine was then injected to the marked site. The patient’s neck was then prepped and draped in the usual sterile fashion. NIMS was then setup, calibrated, and found to be working appropriately.       Skin incision was then made at the marked site in a transverse fashion using a 15 blade. Dissection continued through subcutaneous tissues and platysma using the 15 blade and Bovie cautery. Superficial flaps were then elevated in the subplatysmal plane using Bovie cautery. Dissection then continued in midline in between strap musculature using DeBakey forceps and Bovie cautery. Attention was then turned to the right lobe. Strap muscles were elevated off the underlying thyroid gland on the right side using Bovie cautery and blunt dissection. The strap muscles were then held in a retracted position using an [employer] retractor. Dissection then continued around the lobe of the thyroid gland, immediately adjacent to the capsule of the gland using bipolar cautery. This dissection was carried out along the superior aspect of the isthmus, extending along the medial aspect of the upper pole, while retracting the left lobe and isthmus toward the left. The superior pole was retracted medially and inferiorly, and dissection continued around the lateral aspect of the superior lobe. The superior pedicle was divided using the bipolar. This allowed further reflection and retraction of the gland medially, and dissection continued inferiorly, with care taken to remain as close as possible to the capsule of the gland to minimize risk of injury or sacrifice of parathyroid glands. With continued dissection, the recurrent laryngeal nerve was identified. Remaining tissue attachments at [facility] ligament were then divided using bipolar, with care taken to limit extent of dissection at the point of entry of the recurrent laryngeal nerve. The R sup para was identified and the blood supply preserved - post dissection it was healthy appearing. The R inf para was also identified and blood supply preserved, but slightly more dusky in appearance. Strap muscles were then elevated off the underlying thyroid gland on the left side using Bovie cautery and blunt dissection.   The strap muscles were then held in a retracted position using an [employer] retractor. Dissection then continued around the lobe of the thyroid gland, immediately adjacent to the capsule of the gland using bipolar and cautery. This dissection was carried out along the superior aspect of the isthmus, extending along the medial aspect of the upper pole. Care was taken to elevate a tiny pyramidal lobe with delphian nodes that was encountered, and it was reflected inferiorly to remain in continuity with the isthmus. The superior pole was retracted medially and inferiorly, and dissection continued around the lateral aspect of the superior lobe. The superior pedicle was divided using bipolar. This allowed further reflection and retraction of the gland medially, and dissection continued inferiorly, with care taken to remain as close as possible to the capsule of the gland to minimize risk of injury or sacrifice of parathyroid glands. With continued dissection, the recurrent laryngeal nerve was identified. Remaining tissue attachments at [facility] ligament were then divided using harmonic, with care taken to limit extent of dissection at the point of entry of the recurrent laryngeal nerve. Remaining attachments to the anterior trachea were divided using cautery. The L sup para was identified and preserved. Remaining attachments to the anterior trachea were divided using bipolar cautery, and the entire thyroid gland was removed, and carefully examined. No adherent parathyroid glands were noted, and the gland was set aside to send to pathology for permanent section evaluation. The bilateral recurrent laryngeal nerves were stimulated at 0.5mA resulting in palpable contraction of the bilateral PCA muscles and stim on the NIMS machine. At this point, we began the central neck dissection. The R inf para was dissected on its pedicle and reflected superiorly.   The RLN was dissected inferiorly and fascia divided superficially. The flaquito packet was dissected off the carotid and esophagus medial to the RLN. The contralateral packet was then dissected off the L RLN, carotid and strap muscle. Once the thyroid bed packets were dissected, the remaining packet was dissected off the trachea. Several large veins and thyroid JENNIFER were identified, ligated and divided. The contents of level 6 was then passed off the field for permanent analysis. The surgical site was irrigated with saline, and hemostasis was ensured using bipolar cautery. Fibrillar hemostatic agent was applied. The surgical site was then closed in multiple layers. Strap muscles were re-approximated across midline using 3-0 Vicryl stitches in a figure of eight fashion, and the same stitch was used in an interrupted fashion to reapproximate the plastysma and tissue in midline at the same plane. Skin was closed using a 5-0 Monocryl stitch in a running sub-cuticular fashion, followed by a Steristrip. Anesthesia was then reversed, and the patient was awakened, extubated and taken to the recovery room in stable condition. All counts were correct at the end of the case, and no complications were encountered. I was present for the entire procedure.     Patient Disposition:  PACU     This procedure was not performed to treat primary cutaneous melanoma through wide local excision      SIGNATURE: Davon Trevino MD  DATE: October 11, 2023  TIME: 5:04 PM

## 2023-10-11 NOTE — ANESTHESIA POSTPROCEDURE EVALUATION
Post-Op Assessment Note    CV Status:  Stable  Pain Score: 0    Pain management: adequate     Mental Status:  Alert and awake   Hydration Status:  Euvolemic   PONV Controlled:  Controlled   Airway Patency:  Patent      Post Op Vitals Reviewed: Yes      Staff: CRNA, Anesthesiologist         No notable events documented.     BP   101/51   Temp   97.9   Pulse  80   Resp   16   SpO2   98

## 2023-10-11 NOTE — ANESTHESIA PREPROCEDURE EVALUATION
Procedure:  TOTAL THYROIDECTOMY, BILATERAL CENTRAL COMPARTMENT NECK DISSECTION WITH NIMS (Neck)  DISSECTION NECK RADICAL (Neck)    Relevant Problems   ENDO   (+) Hypothyroidism      NEURO/PSYCH   (+) Moderate episode of recurrent major depressive disorder (HCC)      Endocrine   (+) Medullary thyroid carcinoma (HCC)        Physical Exam    Airway    Mallampati score: II  TM Distance: >3 FB  Neck ROM: full     Dental   No notable dental hx     Cardiovascular  Rhythm: regular, Rate: normal    Pulmonary   Breath sounds clear to auscultation    Other Findings        Anesthesia Plan  ASA Score- 2     Anesthesia Type- general with ASA Monitors. Additional Monitors: arterial line. Airway Plan: ETT. Plan Factors-Exercise tolerance (METS): >4 METS. Chart reviewed. Existing labs reviewed. Patient summary reviewed. Patient is not a current smoker. Induction- intravenous. Postoperative Plan- Plan for postoperative opioid use. Planned trial extubation    Informed Consent- Anesthetic plan and risks discussed with patient. I personally reviewed this patient with the CRNA. Discussed and agreed on the Anesthesia Plan with the CRNA. Preeti Barragan

## 2023-10-11 NOTE — INTERVAL H&P NOTE
H&P reviewed. After examining the patient I find no changes in the patients condition since the H&P had been written.     NAD  AAOx3  CTAB  RRR  Abd soft NT/ND  PARK    TM/EAC clear bilaterally  OC/OP clear  Neck supple without lymph adenopathy  Nares clear on anterior rhinoscopy      Vitals:    10/11/23 1120   BP: 143/57   Pulse: 97   Resp: 18   Temp: 98.5 °F (36.9 °C)   SpO2: 100%

## 2023-10-12 LAB
ALBUMIN SERPL BCP-MCNC: 3.1 G/DL (ref 3.5–5)
ALBUMIN SERPL BCP-MCNC: 3.3 G/DL (ref 3.5–5)
ALP SERPL-CCNC: 30 U/L (ref 34–104)
ALP SERPL-CCNC: 42 U/L (ref 34–104)
ALT SERPL W P-5'-P-CCNC: 10 U/L (ref 7–52)
ALT SERPL W P-5'-P-CCNC: 8 U/L (ref 7–52)
ANION GAP SERPL CALCULATED.3IONS-SCNC: 6 MMOL/L
ANION GAP SERPL CALCULATED.3IONS-SCNC: 7 MMOL/L
AST SERPL W P-5'-P-CCNC: 17 U/L (ref 13–39)
AST SERPL W P-5'-P-CCNC: 18 U/L (ref 13–39)
BILIRUB SERPL-MCNC: 0.26 MG/DL (ref 0.2–1)
BILIRUB SERPL-MCNC: 0.45 MG/DL (ref 0.2–1)
BUN SERPL-MCNC: 11 MG/DL (ref 5–25)
BUN SERPL-MCNC: 9 MG/DL (ref 5–25)
CALCIUM ALBUM COR SERPL-MCNC: 8.7 MG/DL (ref 8.3–10.1)
CALCIUM ALBUM COR SERPL-MCNC: 9.3 MG/DL (ref 8.3–10.1)
CALCIUM SERPL-MCNC: 8 MG/DL (ref 8.4–10.2)
CALCIUM SERPL-MCNC: 8.1 MG/DL (ref 8.4–10.2)
CALCIUM SERPL-MCNC: 8.7 MG/DL (ref 8.4–10.2)
CHLORIDE SERPL-SCNC: 106 MMOL/L (ref 96–108)
CHLORIDE SERPL-SCNC: 106 MMOL/L (ref 96–108)
CO2 SERPL-SCNC: 24 MMOL/L (ref 21–32)
CO2 SERPL-SCNC: 28 MMOL/L (ref 21–32)
CREAT SERPL-MCNC: 0.5 MG/DL (ref 0.6–1.3)
CREAT SERPL-MCNC: 0.63 MG/DL (ref 0.6–1.3)
GFR SERPL CREATININE-BSD FRML MDRD: 107 ML/MIN/1.73SQ M
GFR SERPL CREATININE-BSD FRML MDRD: 116 ML/MIN/1.73SQ M
GLUCOSE P FAST SERPL-MCNC: 84 MG/DL (ref 65–99)
GLUCOSE SERPL-MCNC: 110 MG/DL (ref 65–140)
GLUCOSE SERPL-MCNC: 84 MG/DL (ref 65–140)
POTASSIUM SERPL-SCNC: 3.4 MMOL/L (ref 3.5–5.3)
POTASSIUM SERPL-SCNC: 3.8 MMOL/L (ref 3.5–5.3)
PROT SERPL-MCNC: 5.2 G/DL (ref 6.4–8.4)
PROT SERPL-MCNC: 5.5 G/DL (ref 6.4–8.4)
PTH-INTACT P EXCISION SERPL-MCNC: <6 PG/ML (ref 12–88)
PTH-INTACT SERPL-MCNC: 2 PG/ML (ref 12–88)
SODIUM SERPL-SCNC: 137 MMOL/L (ref 135–147)
SODIUM SERPL-SCNC: 140 MMOL/L (ref 135–147)

## 2023-10-12 PROCEDURE — 82310 ASSAY OF CALCIUM: CPT

## 2023-10-12 PROCEDURE — 80053 COMPREHEN METABOLIC PANEL: CPT | Performed by: OTOLARYNGOLOGY

## 2023-10-12 PROCEDURE — 83970 ASSAY OF PARATHORMONE: CPT | Performed by: OTOLARYNGOLOGY

## 2023-10-12 PROCEDURE — 83970 ASSAY OF PARATHORMONE: CPT

## 2023-10-12 RX ORDER — LANOLIN ALCOHOL/MO/W.PET/CERES
2 CREAM (GRAM) TOPICAL 3 TIMES DAILY
Status: DISCONTINUED | OUTPATIENT
Start: 2023-10-12 | End: 2023-10-13 | Stop reason: HOSPADM

## 2023-10-12 RX ADMIN — SODIUM CHLORIDE, SODIUM LACTATE, POTASSIUM CHLORIDE, AND CALCIUM CHLORIDE 125 ML/HR: .6; .31; .03; .02 INJECTION, SOLUTION INTRAVENOUS at 11:05

## 2023-10-12 RX ADMIN — LEVOTHYROXINE SODIUM 50 MCG: 50 TABLET ORAL at 05:04

## 2023-10-12 RX ADMIN — HEPARIN SODIUM 5000 UNITS: 5000 INJECTION INTRAVENOUS; SUBCUTANEOUS at 13:46

## 2023-10-12 RX ADMIN — Medication 2 TABLET: at 16:25

## 2023-10-12 RX ADMIN — SODIUM CHLORIDE, SODIUM LACTATE, POTASSIUM CHLORIDE, AND CALCIUM CHLORIDE 125 ML/HR: .6; .31; .03; .02 INJECTION, SOLUTION INTRAVENOUS at 03:08

## 2023-10-12 RX ADMIN — SODIUM CHLORIDE, SODIUM LACTATE, POTASSIUM CHLORIDE, AND CALCIUM CHLORIDE 125 ML/HR: .6; .31; .03; .02 INJECTION, SOLUTION INTRAVENOUS at 19:11

## 2023-10-12 RX ADMIN — HEPARIN SODIUM 5000 UNITS: 5000 INJECTION INTRAVENOUS; SUBCUTANEOUS at 05:04

## 2023-10-12 RX ADMIN — ACETAMINOPHEN 650 MG: 325 TABLET, FILM COATED ORAL at 13:25

## 2023-10-12 RX ADMIN — HEPARIN SODIUM 5000 UNITS: 5000 INJECTION INTRAVENOUS; SUBCUTANEOUS at 21:17

## 2023-10-12 RX ADMIN — ACETAMINOPHEN 650 MG: 325 TABLET, FILM COATED ORAL at 20:51

## 2023-10-12 RX ADMIN — Medication 2 TABLET: at 20:51

## 2023-10-12 RX ADMIN — Medication 2 TABLET: at 08:49

## 2023-10-12 NOTE — PLAN OF CARE
Problem: PAIN - ADULT  Goal: Verbalizes/displays adequate comfort level or baseline comfort level  Description: Interventions:  - Encourage patient to monitor pain and request assistance  - Assess pain using appropriate pain scale  - Administer analgesics based on type and severity of pain and evaluate response  - Implement non-pharmacological measures as appropriate and evaluate response  - Consider cultural and social influences on pain and pain management  - Notify physician/advanced practitioner if interventions unsuccessful or patient reports new pain  Outcome: Progressing     Problem: INFECTION - ADULT  Goal: Absence or prevention of progression during hospitalization  Description: INTERVENTIONS:  - Assess and monitor for signs and symptoms of infection  - Monitor lab/diagnostic results  - Monitor all insertion sites, i.e. indwelling lines, tubes, and drains  - Monitor endotracheal if appropriate and nasal secretions for changes in amount and color  - [place] appropriate cooling/warming therapies per order  - Administer medications as ordered  - Instruct and encourage patient and family to use good hand hygiene technique  - Identify and instruct in appropriate isolation precautions for identified infection/condition  Outcome: Progressing  Goal: Absence of fever/infection during neutropenic period  Description: INTERVENTIONS:  - Monitor WBC    Outcome: Progressing     Problem: SAFETY ADULT  Goal: Patient will remain free of falls  Description: INTERVENTIONS:  - Educate patient/family on patient safety including physical limitations  - Instruct patient to call for assistance with activity   - Consult OT/PT to assist with strengthening/mobility   - Keep Call bell within reach  - Keep bed low and locked with side rails adjusted as appropriate  - Keep care items and personal belongings within reach  - Initiate and maintain comfort rounds  - Make Fall Risk Sign visible to staff  - Obtain necessary fall risk management equipment  - Apply yellow socks and bracelet for high fall risk patients  - Consider moving patient to room near nurses station  Outcome: Progressing  Goal: Maintain or return to baseline ADL function  Description: INTERVENTIONS:  -  Assess patient's ability to carry out ADLs; assess patient's baseline for ADL function and identify physical deficits which impact ability to perform ADLs (bathing, care of mouth/teeth, toileting, grooming, dressing, etc.)  - Assess/evaluate cause of self-care deficits   - Assess range of motion  - Assess patient's mobility; develop plan if impaired  - Assess patient's need for assistive devices and provide as appropriate  - Encourage maximum independence but intervene and supervise when necessary  - Involve family in performance of ADLs  - Assess for home care needs following discharge   - Consider OT consult to assist with ADL evaluation and planning for discharge  - Provide patient education as appropriate  Outcome: Progressing  Goal: Maintains/Returns to pre admission functional level  Description: INTERVENTIONS:  - Perform BMAT or MOVE assessment daily.   - Set and communicate daily mobility goal to care team and patient/family/caregiver.    - Collaborate with rehabilitation services on mobility goals if consulted  - Out of bed for toileting  - Record patient progress and toleration of activity level   Outcome: Progressing     Problem: DISCHARGE PLANNING  Goal: Discharge to home or other facility with appropriate resources  Description: INTERVENTIONS:  - Identify barriers to discharge w/patient and caregiver  - Arrange for needed discharge resources and transportation as appropriate  - Identify discharge learning needs (meds, wound care, etc.)  - Arrange for interpretive services to assist at discharge as needed  - Refer to Case Management Department for coordinating discharge planning if the patient needs post-hospital services based on physician/advanced practitioner order or complex needs related to functional status, cognitive ability, or social support system  Outcome: Progressing

## 2023-10-12 NOTE — PROGRESS NOTES
OTOLARYNGOLOGY PROGRESS NOTE    Date of Service: 10/12/2023 4:43 AM    HPI  Patient is a 56 y.o. female with right medullary thyroid cancer now s/p total thyroidectomy and central neck compartment dissection. Overnight Events: no acute events. Patient felt the skin around the incision was turning red. Denies any numbness or tingling on fingers toes or perioral region    PHYSICAL EXAM:  Vitals:    10/11/23 2300   BP:    Pulse: (!) 116   Resp:    Temp:    SpO2: 96%        General: No acute distress, AOx4  HEENT: voice good, no hematoma, steri strips intact on transcervical incision, negative Chvostek sign  Neurology: No focal deficits  Lungs: Breathing easy, unlabored and even on room air  Cardio: RRR, well perfused  Abd: soft, NT, ND       Intake/Output Summary (Last 24 hours) at 10/12/2023 0443  Last data filed at 10/11/2023 1725  Gross per 24 hour   Intake 1600 ml   Output 320 ml   Net 1280 ml         LABORATORY    Recent Labs     10/11/23  1759          No results for input(s): "NA", "K", "CL", "BUN", "CREAT", "PHOS", "MG" in the last 72 hours. Invalid input(s): "TCO2", "GLU", "CA", "CAIONIZ"    Invalid input(s): "PTPAT", "PTINR", "APTTMNNM", "APTTPAT"      Patient Active Problem List   Diagnosis    Cough    Allergic state    URI, acute    Melasma    Pulmonary nodule seen on imaging study    Pseudofolliculitis    Moderate episode of recurrent major depressive disorder ([facility])    Medullary thyroid carcinoma ([facility])    Hypercalcemia    Hypothyroidism         ASSESSMENT  Patient is a 56 y.o. female w/ acute and chronic problems as above, who presents s/p total thyroidectomy with central neck compartment dissection; doing well.     PLAN  - morning labs  - regular diet  - OOB / ambulate  - DVT ppx  - Calcium 1 g TID    Dispo: pending AM PTH, likely will stay one more night

## 2023-10-12 NOTE — DISCHARGE INSTR - AVS FIRST PAGE
Thyroidectomy   WHAT YOU NEED TO KNOW:   Thyroidectomy is surgery to remove your thyroid gland. Your thyroid is a gland in the front lower part of your neck. The thyroid makes hormones that regulate your metabolism, body temperature, and heart rate. Smaller glands called parathyroids regulate the level of calcium in your blood. You have 4 parathyroids, located on the sides of your thyroid gland. Your parathyroids will not be removed during this surgery. DISCHARGE INSTRUCTIONS:   Medicines: The following medicines have been ordered by your healthcare provider:  Pain medicine  can help take away or decrease pain. Do not wait until the pain is severe before you take your medicine. Only use the oxycodone/acetaminophen for severe pain, otherwise use just acetaminophen (Tylenol). Thyroid medicine  Has been prescribed to replace your thyroid hormone. Calcium supplements: It is important to take the calcium and  vitamin D as prescribed. Take your medicine as directed. Contact your healthcare provider if you think your medicine is not helping or if you have side effects. Tell him or her if you are allergic to any medicine. Keep a list of the medicines, vitamins, and herbs you take. Include the amounts, and when and why you take them. Bring the list or the pill bottles to follow-up visits. Carry your medicine list with you in case of an emergency. Please contact the office if you experience any numbness or tingling of the fingers, toes, and mouth and we will tell you to take the calcitriol. Follow up with your endocrinologist or surgeon as directed: You will need to return to have your wound checked and stitches removed. You may also need blood tests to monitor your calcium, parathyroid, and thyroid hormone levels. Write down your questions so you remember to ask them during your visits. Self-care:   Rest when you need to while you heal after surgery.   Slowly start to do more each day. Return to your daily activities as directed. Wound care:  Carefully wash your skin near the incision wound area with soap and water. Dry the area and put on new, clean bandages as directed. Change your bandages when they get wet or dirty. You can use a mild body lotion to improve the scar. Swallowing and voice changes: You may have a sore throat, hoarse voice, or difficulty swallowing after surgery. These symptoms should go away after a few days. Contact your endocrinologist or surgeon if:   You have a fever or chills. You feel very tired and cold, gain weight for no reason, and your skin is very dry. You vomit several times in a row. Your incision is red, swollen, or draining pus. You have new voice weakness or hoarseness, or it is getting worse. You have questions or concerns about your condition or care. Seek care immediately or call 911 if:   You have sudden tingling or muscle cramps in your face, arm, or leg. You have muscle spasms in your legs and feet that do not go away. Blood soaks through your bandage. Your stitches come apart. You have sudden swelling in your neck or difficulty swallowing. You have trouble breathing. You have a seizure. © 2017 [address] [place] [address] [employer] Information is for End User's use only and may not be sold, redistributed or otherwise used for commercial purposes. All illustrations and images included in CareNotes® are the copyrighted property of [employer], [employer]. or Jose Lang. The above information is an  only. It is not intended as medical advice for individual conditions or treatments. Talk to your doctor, nurse or pharmacist before following any medical regimen to see if it is safe and effective for you. Call Dr. Murray Rodgers with any questions or concerns: office [phone]; mobile [phone] (urgent issues).

## 2023-10-13 VITALS
HEART RATE: 71 BPM | TEMPERATURE: 98.3 F | DIASTOLIC BLOOD PRESSURE: 54 MMHG | RESPIRATION RATE: 18 BRPM | SYSTOLIC BLOOD PRESSURE: 113 MMHG | OXYGEN SATURATION: 95 %

## 2023-10-13 LAB
ALBUMIN SERPL BCP-MCNC: 3.4 G/DL (ref 3.5–5)
ALP SERPL-CCNC: 37 U/L (ref 34–104)
ALT SERPL W P-5'-P-CCNC: 16 U/L (ref 7–52)
ANION GAP SERPL CALCULATED.3IONS-SCNC: 5 MMOL/L
AST SERPL W P-5'-P-CCNC: 25 U/L (ref 13–39)
BILIRUB SERPL-MCNC: 0.35 MG/DL (ref 0.2–1)
BUN SERPL-MCNC: 7 MG/DL (ref 5–25)
CALCIUM ALBUM COR SERPL-MCNC: 9.4 MG/DL (ref 8.3–10.1)
CALCIUM SERPL-MCNC: 8.9 MG/DL (ref 8.4–10.2)
CHLORIDE SERPL-SCNC: 106 MMOL/L (ref 96–108)
CO2 SERPL-SCNC: 29 MMOL/L (ref 21–32)
CREAT SERPL-MCNC: 0.49 MG/DL (ref 0.6–1.3)
GFR SERPL CREATININE-BSD FRML MDRD: 117 ML/MIN/1.73SQ M
GLUCOSE P FAST SERPL-MCNC: 96 MG/DL (ref 65–99)
GLUCOSE SERPL-MCNC: 96 MG/DL (ref 65–140)
POTASSIUM SERPL-SCNC: 3.6 MMOL/L (ref 3.5–5.3)
PROT SERPL-MCNC: 5.5 G/DL (ref 6.4–8.4)
PTH-INTACT SERPL-MCNC: 1.6 PG/ML (ref 12–88)
SODIUM SERPL-SCNC: 140 MMOL/L (ref 135–147)

## 2023-10-13 PROCEDURE — 83970 ASSAY OF PARATHORMONE: CPT

## 2023-10-13 PROCEDURE — 80053 COMPREHEN METABOLIC PANEL: CPT

## 2023-10-13 RX ORDER — LANOLIN ALCOHOL/MO/W.PET/CERES
400 CREAM (GRAM) TOPICAL 2 TIMES DAILY
Qty: 42 TABLET | Refills: 0 | Status: SHIPPED | OUTPATIENT
Start: 2023-10-13 | End: 2023-11-03

## 2023-10-13 RX ORDER — CALCITRIOL 0.25 UG/1
0.25 CAPSULE, LIQUID FILLED ORAL AS NEEDED
Qty: 30 CAPSULE | Refills: 0 | Status: SHIPPED | OUTPATIENT
Start: 2023-10-13 | End: 2023-11-03

## 2023-10-13 RX ORDER — B-COMPLEX WITH VITAMIN C
TABLET ORAL
Qty: 84 TABLET | Refills: 0 | Status: SHIPPED | OUTPATIENT
Start: 2023-10-13 | End: 2023-11-03

## 2023-10-13 RX ADMIN — LEVOTHYROXINE SODIUM 50 MCG: 50 TABLET ORAL at 05:27

## 2023-10-13 RX ADMIN — SODIUM CHLORIDE, SODIUM LACTATE, POTASSIUM CHLORIDE, AND CALCIUM CHLORIDE 125 ML/HR: .6; .31; .03; .02 INJECTION, SOLUTION INTRAVENOUS at 02:46

## 2023-10-13 RX ADMIN — HEPARIN SODIUM 5000 UNITS: 5000 INJECTION INTRAVENOUS; SUBCUTANEOUS at 05:27

## 2023-10-13 RX ADMIN — Medication 2 TABLET: at 08:08

## 2023-10-13 RX ADMIN — ACETAMINOPHEN 650 MG: 325 TABLET, FILM COATED ORAL at 08:12

## 2023-10-13 NOTE — PROGRESS NOTES
OTOLARYNGOLOGY PROGRESS NOTE    Date of Service: 10/13/2023 6:35 AM    HPI  Patient is a 56 y.o. female with right medullary thyroid cancer now s/p total thyroidectomy and central neck compartment dissection. Overnight Events: no acute events. Patient feels better today. PHYSICAL EXAM:  Vitals:    10/12/23 1558   BP: 112/70   Pulse:    Resp: 17   Temp: 98.7 °F (37.1 °C)   SpO2:         General: No acute distress, AOx4  HEENT: voice good, no hematoma, steri strips intact on transcervical incision, negative Chvostek sign  Neurology: No focal deficits  Lungs: Breathing easy, unlabored and even on room air  Cardio: RRR, well perfused  Abd: soft, NT, ND       Intake/Output Summary (Last 24 hours) at 10/13/2023 9827  Last data filed at 10/13/2023 0246  Gross per 24 hour   Intake 2595.84 ml   Output --   Net 2595.84 ml         LABORATORY    Ca 8.9    Recent Labs     10/11/23  1759          Recent Labs     10/12/23  0444 10/12/23  1915 10/13/23  0439   K 3.8 3.4* 3.6    106 106   BUN 9 11 7       Invalid input(s): "PTPAT", "PTINR", "APTTMNNM", "APTTPAT"      Patient Active Problem List   Diagnosis    Cough    Allergic state    URI, acute    Melasma    Pulmonary nodule seen on imaging study    Pseudofolliculitis    Moderate episode of recurrent major depressive disorder (HCC)    Medullary thyroid carcinoma (HCC)    Hypercalcemia    Hypothyroidism         ASSESSMENT  Patient is a 56 y.o. female w/ acute and chronic problems as above, who presents s/p total thyroidectomy with central neck compartment dissection POD 2; doing well.     PLAN  - morning labs  - regular diet  - OOB / ambulate  - DVT ppx    Dispo: likely discharge today

## 2023-10-14 NOTE — UTILIZATION REVIEW
NOTIFICATION OF INPATIENT ADMISSION   AUTHORIZATION REQUEST   SERVICING FACILITY:   [address] [place] [address]  Tax ID: [id number]  NPI: [id number]   ATTENDING PROVIDER:  Attending Name and NPI#: Cedrick Hoover Md [[id number]]  Address: [address] [place] [address]  Phone: [phone]     ADMISSION INFORMATION:  Place of Service: Inpatient [address] [place] [address] Code: 21  Inpatient Admission Date/Time: 10/13/23  8:09 AM  Discharge Date/Time: 10/13/2023  1:24 PM  Admitting Diagnosis Code/Description:  Medullary thyroid carcinoma ([address]) Suresh Rojas     UTILIZATION REVIEW CONTACT:  Madalyn Navarrete Utilization   Network Utilization Review Department  Phone: [phone]  Fax: [phone]  Email: [email or web address]  Contact for approvals/pending authorizations, clinical reviews, and discharge. PHYSICIAN ADVISORY SERVICES:  Medical Necessity Denial & [facility] Review  Phone: [phone]  Fax: [phone]  Email: [email or web address]     DISCHARGE SUPPORT TEAM:  For Patients Discharge Needs & Updates  Phone: [phone] opt. 2 Fax: [phone]  Email: [email or web address]

## 2023-10-16 ENCOUNTER — TRANSITIONAL CARE MANAGEMENT (OUTPATIENT)
Dept: FAMILY MEDICINE CLINIC | Facility: CLINIC | Age: 48
End: 2023-10-16

## 2023-10-16 NOTE — DISCHARGE SUMMARY
Discharge Summary - Head and Neck Surgery   Donel Leader 56 y.o. female MRN: [id number]  Unit/Bed#: S -54 Encounter: [id number]    Admission Date:   Admission Orders (From admission, onward)       Ordered        10/13/23 0809  Inpatient Admission  Once                             Discharge Date: 10/20/23    Admitting Diagnosis: Medullary thyroid carcinoma ([address]) Bc Kramer    Discharge Diagnosis: Medullary thyroid carcinoma    Medical Problems       Resolved Problems  Date Reviewed: 7/18/2023   None         Attending: Lena Judge    Consulting Physician(s): none     Procedures Performed: No orders of the defined types were placed in this encounter. Pathology: pending    Hospital Course: Patient underwent total thyroidectomy with central compartment neck dissection on 10/18/2023. The procedure was uncomplicated. She stayed 2 nights to monitor her calcium. She was evaluated on the morning of October 20, 2023. She is found to be adequate for discharge. Condition at Discharge: good     Discharge instructions/Information to patient and family:   See after visit summary for information provided to patient and family. Provisions for Follow-Up Care:  See after visit summary for information related to follow-up care and any pertinent home health orders. Disposition: Home          Planned Readmission: No    Discharge Statement   I spent 30 minutes discharging the patient. This time was spent on the day of discharge. I had direct contact with the patient on the day of discharge. Additional documentation is required if more than 30 minutes were spent on discharge. Discharge Medications:  See after visit summary for reconciled discharge medications provided to patient and family.

## 2023-10-18 PROCEDURE — 88313 SPECIAL STAINS GROUP 2: CPT | Performed by: PATHOLOGY

## 2023-10-18 PROCEDURE — 88341 IMHCHEM/IMCYTCHM EA ADD ANTB: CPT | Performed by: PATHOLOGY

## 2023-10-18 PROCEDURE — 88342 IMHCHEM/IMCYTCHM 1ST ANTB: CPT | Performed by: PATHOLOGY

## 2023-10-18 PROCEDURE — 88307 TISSUE EXAM BY PATHOLOGIST: CPT | Performed by: PATHOLOGY

## 2023-10-19 NOTE — DISCHARGE SUMMARY
Discharge Summary - ENT   Dante Arevalo 56 y.o. female MRN: [id number]  Unit/Bed#: S -49 Encounter: [id number]    Admission Date:   Admission Orders (From admission, onward)       Ordered        10/13/23 0809  Inpatient Admission  Once                            Admitting Diagnosis: Medullary thyroid carcinoma ([address]) [C73]    HPI: 47F with medullary thyroid cancer    Procedures Performed: No orders of the defined types were placed in this encounter. Hospital Course: uneventful    Significant Findings, Care, Treatment and Services Provided: total thyroidectomy, bilateral CCND    Lab Results:     Complications: none    Discharge Diagnosis: medullary thyroid cancer    Medical Problems       Resolved Problems  Date Reviewed: 7/18/2023   None         Condition at Discharge: good         Discharge instructions/Information to patient and family:   See after visit summary for information provided to patient and family. Provisions for Follow-Up Care:  See after visit summary for information related to follow-up care and any pertinent home health orders. Disposition: Home      Planned Readmission: No    Discharge Statement   I spent 30 minutes discharging the patient. This time was spent on the day of discharge. I had direct contact with the patient on the day of discharge. Additional documentation is required if more than 30 minutes were spent on discharge. Discharge Medications:  See after visit summary for reconciled discharge medications provided to patient and family.

## 2023-10-22 ENCOUNTER — DOCUMENTATION (OUTPATIENT)
Dept: OTHER | Facility: HOSPITAL | Age: 48
End: 2023-10-22

## 2023-10-22 DIAGNOSIS — C73 MEDULLARY THYROID CARCINOMA (HCC): Primary | ICD-10-CM

## 2023-10-23 ENCOUNTER — APPOINTMENT (OUTPATIENT)
Age: 48
End: 2023-10-23
Payer: COMMERCIAL

## 2023-10-23 DIAGNOSIS — C73 MEDULLARY THYROID CARCINOMA (HCC): ICD-10-CM

## 2023-10-23 LAB
ALBUMIN SERPL BCP-MCNC: 4.4 G/DL (ref 3.5–5)
ALP SERPL-CCNC: 54 U/L (ref 34–104)
ALT SERPL W P-5'-P-CCNC: 15 U/L (ref 7–52)
ANION GAP SERPL CALCULATED.3IONS-SCNC: 7 MMOL/L
AST SERPL W P-5'-P-CCNC: 17 U/L (ref 13–39)
BILIRUB SERPL-MCNC: 0.53 MG/DL (ref 0.2–1)
BUN SERPL-MCNC: 12 MG/DL (ref 5–25)
CALCIUM SERPL-MCNC: 8.8 MG/DL (ref 8.4–10.2)
CHLORIDE SERPL-SCNC: 102 MMOL/L (ref 96–108)
CO2 SERPL-SCNC: 28 MMOL/L (ref 21–32)
CREAT SERPL-MCNC: 0.59 MG/DL (ref 0.6–1.3)
GFR SERPL CREATININE-BSD FRML MDRD: 110 ML/MIN/1.73SQ M
GLUCOSE P FAST SERPL-MCNC: 102 MG/DL (ref 65–99)
POTASSIUM SERPL-SCNC: 4.5 MMOL/L (ref 3.5–5.3)
PROT SERPL-MCNC: 7.5 G/DL (ref 6.4–8.4)
PTH-INTACT SERPL-MCNC: 44.4 PG/ML (ref 12–88)
SODIUM SERPL-SCNC: 137 MMOL/L (ref 135–147)
T4 FREE SERPL-MCNC: 0.86 NG/DL (ref 0.61–1.12)
TSH SERPL DL<=0.05 MIU/L-ACNC: 8.63 UIU/ML (ref 0.45–4.5)

## 2023-10-23 PROCEDURE — 36415 COLL VENOUS BLD VENIPUNCTURE: CPT

## 2023-10-23 PROCEDURE — 84443 ASSAY THYROID STIM HORMONE: CPT

## 2023-10-23 PROCEDURE — 84439 ASSAY OF FREE THYROXINE: CPT

## 2023-10-23 PROCEDURE — 80053 COMPREHEN METABOLIC PANEL: CPT

## 2023-10-23 PROCEDURE — 83970 ASSAY OF PARATHORMONE: CPT

## 2023-10-24 ENCOUNTER — APPOINTMENT (EMERGENCY)
Dept: CT IMAGING | Facility: HOSPITAL | Age: 48
DRG: 071 | End: 2023-10-24
Payer: COMMERCIAL

## 2023-10-24 ENCOUNTER — HOSPITAL ENCOUNTER (INPATIENT)
Facility: HOSPITAL | Age: 48
LOS: 4 days | Discharge: HOME | DRG: 071 | End: 2023-10-28
Attending: EMERGENCY MEDICINE | Admitting: INTERNAL MEDICINE
Payer: COMMERCIAL

## 2023-10-24 DIAGNOSIS — R00.0 SINUS TACHYCARDIA: ICD-10-CM

## 2023-10-24 DIAGNOSIS — C73 MEDULLARY THYROID CARCINOMA (HCC): ICD-10-CM

## 2023-10-24 DIAGNOSIS — R41.0 DELIRIUM: Primary | ICD-10-CM

## 2023-10-24 DIAGNOSIS — E89.0 H/O THYROIDECTOMY: ICD-10-CM

## 2023-10-24 DIAGNOSIS — E83.42 HYPOMAGNESEMIA: ICD-10-CM

## 2023-10-24 DIAGNOSIS — G93.41 ACUTE METABOLIC ENCEPHALOPATHY: ICD-10-CM

## 2023-10-24 LAB
ALBUMIN SERPL BCP-MCNC: 4.1 G/DL (ref 3.5–5)
ALP SERPL-CCNC: 48 U/L (ref 34–104)
ALT SERPL W P-5'-P-CCNC: 14 U/L (ref 7–52)
AMPHETAMINES SERPL QL SCN: NEGATIVE
ANION GAP SERPL CALCULATED.3IONS-SCNC: 10 MMOL/L
APAP SERPL-MCNC: <10 UG/ML (ref 10–20)
AST SERPL W P-5'-P-CCNC: 16 U/L (ref 13–39)
BACTERIA UR QL AUTO: ABNORMAL /HPF
BARBITURATES UR QL: NEGATIVE
BASE EX.OXY STD BLDV CALC-SCNC: 91.4 % (ref 60–80)
BASE EXCESS BLDV CALC-SCNC: -2.7 MMOL/L
BASOPHILS # BLD AUTO: 0.06 THOUSANDS/ÂΜL (ref 0–0.1)
BASOPHILS NFR BLD AUTO: 1 % (ref 0–1)
BENZODIAZ UR QL: NEGATIVE
BILIRUB DIRECT SERPL-MCNC: 0.08 MG/DL (ref 0–0.2)
BILIRUB SERPL-MCNC: 0.39 MG/DL (ref 0.2–1)
BILIRUB UR QL STRIP: NEGATIVE
BUN SERPL-MCNC: 12 MG/DL (ref 5–25)
CA-I BLD-SCNC: 1.11 MMOL/L (ref 1.12–1.32)
CALCIUM SERPL-MCNC: 9.2 MG/DL (ref 8.4–10.2)
CARDIAC TROPONIN I PNL SERPL HS: 4 NG/L
CHLORIDE SERPL-SCNC: 102 MMOL/L (ref 96–108)
CLARITY UR: CLEAR
CO2 SERPL-SCNC: 23 MMOL/L (ref 21–32)
COCAINE UR QL: NEGATIVE
COLOR UR: ABNORMAL
CREAT SERPL-MCNC: 0.71 MG/DL (ref 0.6–1.3)
EOSINOPHIL # BLD AUTO: 0 THOUSAND/ÂΜL (ref 0–0.61)
EOSINOPHIL NFR BLD AUTO: 0 % (ref 0–6)
ERYTHROCYTE [DISTWIDTH] IN BLOOD BY AUTOMATED COUNT: 12.5 % (ref 11.6–15.1)
ETHANOL SERPL-MCNC: <10 MG/DL
GFR SERPL CREATININE-BSD FRML MDRD: 102 ML/MIN/1.73SQ M
GLUCOSE SERPL-MCNC: 123 MG/DL (ref 65–140)
GLUCOSE SERPL-MCNC: 124 MG/DL (ref 65–140)
GLUCOSE UR STRIP-MCNC: NEGATIVE MG/DL
HCO3 BLDV-SCNC: 20.9 MMOL/L (ref 24–30)
HCT VFR BLD AUTO: 40.2 % (ref 34.8–46.1)
HGB BLD-MCNC: 14 G/DL (ref 11.5–15.4)
HGB UR QL STRIP.AUTO: NEGATIVE
IMM GRANULOCYTES # BLD AUTO: 0.01 THOUSAND/UL (ref 0–0.2)
IMM GRANULOCYTES NFR BLD AUTO: 0 % (ref 0–2)
KETONES UR STRIP-MCNC: ABNORMAL MG/DL
LEUKOCYTE ESTERASE UR QL STRIP: NEGATIVE
LYMPHOCYTES # BLD AUTO: 1.55 THOUSANDS/ÂΜL (ref 0.6–4.47)
LYMPHOCYTES NFR BLD AUTO: 15 % (ref 14–44)
MAGNESIUM SERPL-MCNC: 1.6 MG/DL (ref 1.9–2.7)
MCH RBC QN AUTO: 33.3 PG (ref 26.8–34.3)
MCHC RBC AUTO-ENTMCNC: 34.8 G/DL (ref 31.4–37.4)
MCV RBC AUTO: 96 FL (ref 82–98)
METHADONE UR QL: NEGATIVE
MONOCYTES # BLD AUTO: 0.54 THOUSAND/ÂΜL (ref 0.17–1.22)
MONOCYTES NFR BLD AUTO: 5 % (ref 4–12)
MUCOUS THREADS UR QL AUTO: ABNORMAL
NEUTROPHILS # BLD AUTO: 8.05 THOUSANDS/ÂΜL (ref 1.85–7.62)
NEUTS SEG NFR BLD AUTO: 79 % (ref 43–75)
NITRITE UR QL STRIP: NEGATIVE
NON-SQ EPI CELLS URNS QL MICRO: ABNORMAL /HPF
NRBC BLD AUTO-RTO: 0 /100 WBCS
O2 CT BLDV-SCNC: 18.2 ML/DL
OPIATES UR QL SCN: NEGATIVE
OXYCODONE+OXYMORPHONE UR QL SCN: NEGATIVE
PCO2 BLDV: 33 MM HG (ref 42–50)
PCP UR QL: NEGATIVE
PH BLDV: 7.42 [PH] (ref 7.3–7.4)
PH UR STRIP.AUTO: 6.5 [PH]
PLATELET # BLD AUTO: 464 THOUSANDS/UL (ref 149–390)
PMV BLD AUTO: 10.2 FL (ref 8.9–12.7)
PO2 BLDV: 63 MM HG (ref 35–45)
POTASSIUM SERPL-SCNC: 3.5 MMOL/L (ref 3.5–5.3)
PROT SERPL-MCNC: 7 G/DL (ref 6.4–8.4)
PROT UR STRIP-MCNC: ABNORMAL MG/DL
RBC # BLD AUTO: 4.2 MILLION/UL (ref 3.81–5.12)
RBC #/AREA URNS AUTO: ABNORMAL /HPF
SALICYLATES SERPL-MCNC: <5 MG/DL (ref 3–20)
SODIUM SERPL-SCNC: 135 MMOL/L (ref 135–147)
SP GR UR STRIP.AUTO: 1.01 (ref 1–1.03)
THC UR QL: NEGATIVE
TSH SERPL DL<=0.05 MIU/L-ACNC: 10.66 UIU/ML (ref 0.45–4.5)
UROBILINOGEN UR STRIP-ACNC: <2 MG/DL
WBC # BLD AUTO: 10.21 THOUSAND/UL (ref 4.31–10.16)
WBC #/AREA URNS AUTO: ABNORMAL /HPF

## 2023-10-24 PROCEDURE — 81001 URINALYSIS AUTO W/SCOPE: CPT | Performed by: EMERGENCY MEDICINE

## 2023-10-24 PROCEDURE — 96372 THER/PROPH/DIAG INJ SC/IM: CPT

## 2023-10-24 PROCEDURE — 99223 1ST HOSP IP/OBS HIGH 75: CPT | Performed by: INTERNAL MEDICINE

## 2023-10-24 PROCEDURE — 80179 DRUG ASSAY SALICYLATE: CPT | Performed by: EMERGENCY MEDICINE

## 2023-10-24 PROCEDURE — G1004 CDSM NDSC: HCPCS

## 2023-10-24 PROCEDURE — 83735 ASSAY OF MAGNESIUM: CPT | Performed by: EMERGENCY MEDICINE

## 2023-10-24 PROCEDURE — 82805 BLOOD GASES W/O2 SATURATION: CPT | Performed by: EMERGENCY MEDICINE

## 2023-10-24 PROCEDURE — 80307 DRUG TEST PRSMV CHEM ANLYZR: CPT | Performed by: EMERGENCY MEDICINE

## 2023-10-24 PROCEDURE — 82077 ASSAY SPEC XCP UR&BREATH IA: CPT | Performed by: EMERGENCY MEDICINE

## 2023-10-24 PROCEDURE — 99285 EMERGENCY DEPT VISIT HI MDM: CPT | Performed by: EMERGENCY MEDICINE

## 2023-10-24 PROCEDURE — 70450 CT HEAD/BRAIN W/O DYE: CPT

## 2023-10-24 PROCEDURE — 80048 BASIC METABOLIC PNL TOTAL CA: CPT | Performed by: EMERGENCY MEDICINE

## 2023-10-24 PROCEDURE — 84484 ASSAY OF TROPONIN QUANT: CPT | Performed by: EMERGENCY MEDICINE

## 2023-10-24 PROCEDURE — 93005 ELECTROCARDIOGRAM TRACING: CPT

## 2023-10-24 PROCEDURE — 84443 ASSAY THYROID STIM HORMONE: CPT | Performed by: INTERNAL MEDICINE

## 2023-10-24 PROCEDURE — 82948 REAGENT STRIP/BLOOD GLUCOSE: CPT

## 2023-10-24 PROCEDURE — 80076 HEPATIC FUNCTION PANEL: CPT | Performed by: EMERGENCY MEDICINE

## 2023-10-24 PROCEDURE — 80143 DRUG ASSAY ACETAMINOPHEN: CPT | Performed by: EMERGENCY MEDICINE

## 2023-10-24 PROCEDURE — 99285 EMERGENCY DEPT VISIT HI MDM: CPT

## 2023-10-24 PROCEDURE — 82330 ASSAY OF CALCIUM: CPT | Performed by: EMERGENCY MEDICINE

## 2023-10-24 PROCEDURE — 85025 COMPLETE CBC W/AUTO DIFF WBC: CPT | Performed by: EMERGENCY MEDICINE

## 2023-10-24 PROCEDURE — 36415 COLL VENOUS BLD VENIPUNCTURE: CPT | Performed by: EMERGENCY MEDICINE

## 2023-10-24 RX ORDER — LANOLIN ALCOHOL/MO/W.PET/CERES
400 CREAM (GRAM) TOPICAL 2 TIMES DAILY
Status: DISCONTINUED | OUTPATIENT
Start: 2023-10-24 | End: 2023-10-28 | Stop reason: HOSPADM

## 2023-10-24 RX ORDER — ZIPRASIDONE MESYLATE 20 MG/ML
20 INJECTION, POWDER, LYOPHILIZED, FOR SOLUTION INTRAMUSCULAR ONCE
Status: COMPLETED | OUTPATIENT
Start: 2023-10-24 | End: 2023-10-24

## 2023-10-24 RX ORDER — SODIUM CHLORIDE, SODIUM GLUCONATE, SODIUM ACETATE, POTASSIUM CHLORIDE, MAGNESIUM CHLORIDE, SODIUM PHOSPHATE, DIBASIC, AND POTASSIUM PHOSPHATE .53; .5; .37; .037; .03; .012; .00082 G/100ML; G/100ML; G/100ML; G/100ML; G/100ML; G/100ML; G/100ML
75 INJECTION, SOLUTION INTRAVENOUS CONTINUOUS
Status: DISCONTINUED | OUTPATIENT
Start: 2023-10-24 | End: 2023-10-26

## 2023-10-24 RX ORDER — HALOPERIDOL 5 MG/ML
2 INJECTION INTRAMUSCULAR ONCE
Status: COMPLETED | OUTPATIENT
Start: 2023-10-24 | End: 2023-10-25

## 2023-10-24 RX ORDER — LANOLIN ALCOHOL/MO/W.PET/CERES
1 CREAM (GRAM) TOPICAL
Status: DISCONTINUED | OUTPATIENT
Start: 2023-10-24 | End: 2023-10-27

## 2023-10-24 RX ORDER — ACETAMINOPHEN 325 MG/1
650 TABLET ORAL EVERY 6 HOURS PRN
Status: DISCONTINUED | OUTPATIENT
Start: 2023-10-24 | End: 2023-10-28 | Stop reason: HOSPADM

## 2023-10-24 RX ORDER — OLANZAPINE 5 MG/1
5 TABLET, ORALLY DISINTEGRATING ORAL 3 TIMES DAILY PRN
Status: DISCONTINUED | OUTPATIENT
Start: 2023-10-24 | End: 2023-10-28 | Stop reason: HOSPADM

## 2023-10-24 RX ORDER — LEVOTHYROXINE SODIUM 0.07 MG/1
75 TABLET ORAL DAILY
Status: DISCONTINUED | OUTPATIENT
Start: 2023-10-24 | End: 2023-10-25

## 2023-10-24 RX ORDER — MAGNESIUM HYDROXIDE/ALUMINUM HYDROXICE/SIMETHICONE 120; 1200; 1200 MG/30ML; MG/30ML; MG/30ML
30 SUSPENSION ORAL EVERY 6 HOURS PRN
Status: DISCONTINUED | OUTPATIENT
Start: 2023-10-24 | End: 2023-10-28 | Stop reason: HOSPADM

## 2023-10-24 RX ORDER — LANOLIN ALCOHOL/MO/W.PET/CERES
800 CREAM (GRAM) TOPICAL ONCE
Status: COMPLETED | OUTPATIENT
Start: 2023-10-24 | End: 2023-10-24

## 2023-10-24 RX ORDER — WATER 10 ML/10ML
INJECTION INTRAMUSCULAR; INTRAVENOUS; SUBCUTANEOUS
Status: COMPLETED
Start: 2023-10-24 | End: 2023-10-24

## 2023-10-24 RX ORDER — MAGNESIUM SULFATE HEPTAHYDRATE 40 MG/ML
4 INJECTION, SOLUTION INTRAVENOUS ONCE
Status: DISCONTINUED | OUTPATIENT
Start: 2023-10-24 | End: 2023-10-28 | Stop reason: HOSPADM

## 2023-10-24 RX ORDER — CALCITRIOL 0.25 UG/1
0.25 CAPSULE, LIQUID FILLED ORAL DAILY
Status: DISCONTINUED | OUTPATIENT
Start: 2023-10-24 | End: 2023-10-28 | Stop reason: HOSPADM

## 2023-10-24 RX ORDER — ONDANSETRON 2 MG/ML
4 INJECTION INTRAMUSCULAR; INTRAVENOUS EVERY 6 HOURS PRN
Status: DISCONTINUED | OUTPATIENT
Start: 2023-10-24 | End: 2023-10-28 | Stop reason: HOSPADM

## 2023-10-24 RX ORDER — ENOXAPARIN SODIUM 100 MG/ML
40 INJECTION SUBCUTANEOUS DAILY
Status: DISCONTINUED | OUTPATIENT
Start: 2023-10-24 | End: 2023-10-27

## 2023-10-24 RX ORDER — LANOLIN ALCOHOL/MO/W.PET/CERES
1 CREAM (GRAM) TOPICAL 2 TIMES DAILY WITH MEALS
Status: DISCONTINUED | OUTPATIENT
Start: 2023-10-24 | End: 2023-10-24

## 2023-10-24 RX ADMIN — Medication 800 MG: at 15:28

## 2023-10-24 RX ADMIN — WATER 10 ML: 1 INJECTION, SOLUTION INTRAMUSCULAR; INTRAVENOUS; SUBCUTANEOUS at 14:28

## 2023-10-24 RX ADMIN — OLANZAPINE 5 MG: 5 TABLET, ORALLY DISINTEGRATING ORAL at 19:17

## 2023-10-24 RX ADMIN — ENOXAPARIN SODIUM 40 MG: 40 INJECTION SUBCUTANEOUS at 18:14

## 2023-10-24 RX ADMIN — SODIUM CHLORIDE, SODIUM GLUCONATE, SODIUM ACETATE, POTASSIUM CHLORIDE, MAGNESIUM CHLORIDE, SODIUM PHOSPHATE, DIBASIC, AND POTASSIUM PHOSPHATE 75 ML/HR: .53; .5; .37; .037; .03; .012; .00082 INJECTION, SOLUTION INTRAVENOUS at 18:07

## 2023-10-24 RX ADMIN — ZIPRASIDONE MESYLATE 20 MG: 20 INJECTION, POWDER, LYOPHILIZED, FOR SOLUTION INTRAMUSCULAR at 14:27

## 2023-10-24 NOTE — H&P
[address]  H&P  Name: Gayatri Dickinson 56 y.o. female I MRN: [id number]  Unit/Bed#: -01 I Date of Admission: 10/24/2023   Date of Service: 10/24/2023 I Hospital Day: 0      Assessment/Plan   * Acute metabolic encephalopathy  Assessment & Plan  Patient does have acute metabolic encephalopathy and presents with signs of delirium on admission  Given recent total thyroidectomy for the medullary cancer of the thyroid, the most likely cause of her altered mental status is metabolic  Calcium levels are low normal but will give calcium supplementation, patient does have significant hypomagnesemia and will give intravenous magnesium supplementation  Patient does have ketones in her urine, the significance of that is unclear but may be related to limited oral intake  Will give olanzapine as needed for the patient. If the patient remains delirious despite correction of all her metabolic abnormalities, will consider a psychiatry consult at that point of time    Hypomagnesemia  Assessment & Plan  Patient came in with hypomagnesemia with 1.6 on admission  Will give intravenous magnesium 4 g and recheck magnesium in a.m. Discussed with endocrinology, Dr. Glenn Arce and will give calcium replacement despite serum calcium being in the normal range    Hypothyroidism  Assessment & Plan  On levothyroxine    Medullary thyroid carcinoma [facility])  Assessment & Plan  Patient is s/p total thyroidectomy for medullary cancer of the thyroid  Continue levothyroxine 75 mcg daily  Endocrinology consulted for the patient and we will closely follow             VTE Prophylaxis: Enoxaparin (Lovenox)  Code Status: Level 1 - Full Code  Anticipated Length of Stay:  Patient will be admitted on an Inpatient basis with an anticipated length of stay of  less than 2 midnights. Justification for Hospital Stay: Acute metabolic encephalopathy  Total Time for Visit, including Counseling / Coordination of Care: 80 mins.  Greater than 50% of this total time spent on direct patient counseling and coordination of care. Chief Complaint:     Chief Complaint   Patient presents with    Altered Mental Status     Per  pt has been exp change in mental status for the past 48 hours or so. Pt had thyroidectomy on 10/11. Pt appears confused and disoriented to situation intermittently during triage. History of Present Illness:    Bonita Queen is a 56 y.o. female who presents with     Review of Systems:  Negative except as above  History obtained from the patient  General ROS: positive for  - fatigue  Psychological ROS: positive for - disorientation  Ophthalmic ROS: negative  Respiratory ROS: no cough, shortness of breath, or wheezing  Cardiovascular ROS: no chest pain or dyspnea on exertion  Gastrointestinal ROS: no abdominal pain, change in bowel habits, or black or bloody stools  Genito-Urinary ROS: no dysuria, trouble voiding, or hematuria  Musculoskeletal ROS: positive for - muscular weakness  Neurological ROS: no TIA or stroke symptoms  Hematological ROS- No active bleeding  Otherwise, all other 12 point review of systems normal.        Past Medical and Surgical History:   History reviewed. No pertinent past medical history. Past Surgical History:   Procedure Laterality Date    UT THYROIDECTOMY TOTAL/SUBTOTAL LMTD NECK DISSECT N/A 10/11/2023    Procedure: TOTAL THYROIDECTOMY, BILATERAL CENTRAL COMPARTMENT NECK DISSECTION WITH NIMS;  Surgeon: Lon Saravia MD;  Location: [facility];  Service: ENT    US GUIDED THYROID BIOPSY  08/18/2023    WISDOM TOOTH EXTRACTION       Meds/Allergies: Allergies: No Known Allergies  Prior to Admission Medications   Prescriptions Last Dose Informant Patient Reported? Taking?    Biotin 10 MG TABS  Self Yes No   Sig: Take by mouth   Calcium Carb-Cholecalciferol (calcium carbonate-vitamin D) 500 mg-5 mcg tablet Not Taking  Yes No   Sig: PLEASE SEE ATTACHED FOR DETAILED DIRECTIONS   Patient not taking: Reported on 10/24/2023   Multiple Vitamin (MULTIVITAMIN ADULT PO) Not Taking Self Yes No   Sig: Take 1 tablet by mouth daily   Patient not taking: Reported on 10/24/2023   calcitriol (ROCALTROL) 0.25 mcg capsule Not Taking  No No   Sig: Take 1 capsule (0.25 mcg total) by mouth if needed (Please contact the office if you experience any numbness or tingling of the fingers, toes, and mouth and we will tell you to take this medication.) for up to 21 days   Patient not taking: Reported on 10/24/2023   calcium carbonate-vitamin D 500 mg-5 mcg per tablet  Spouse/Significant Other No No   Sig: Take 2 tablets by mouth 3 (three) times a day with meals for 7 days, THEN 2 tablets 2 (two) times a day with meals for 7 days, THEN 2 tablets daily with breakfast for 7 days. Patient taking differently: Take 1 tablets by mouth 2 (three) times a day with meals for 7 days, THEN 2 tablets daily with breakfast for 7 days.    levothyroxine (Synthroid) 75 mcg tablet   No No   Sig: Take 1 tablet (75 mcg total) by mouth daily   magnesium Oxide (MAG-OX) 400 mg TABS Not Taking  No No   Sig: Take 1 tablet (400 mg total) by mouth 2 (two) times a day for 21 days   Patient not taking: Reported on 10/24/2023   magnesium oxide (MAG-OX) 400 mg tablet Not Taking  Yes No   Sig: TAKE 1 TABLET (400 MG TOTAL) BY MOUTH 2 (TWO) TIMES A DAY FOR 21 DAYS   Patient not taking: Reported on 10/24/2023      Facility-Administered Medications Last Administration Doses Remaining   EPINEPHrine (EPIPEN) injection 0.3 mg None recorded 2        Social History:     Social History     Socioeconomic History    Marital status: /Civil Union     Spouse name: Not on file    Number of children: Not on file    Years of education: Not on file    Highest education level: Not on file   Occupational History    Not on file   Tobacco Use    Smoking status: Never    Smokeless tobacco: Never   Vaping Use    Vaping Use: Never used   Substance and Sexual Activity    Alcohol use: Never    Drug use: Never    Sexual activity: Not Currently     Partners: Male     Birth control/protection: Condom Male   Other Topics Concern    Not on file   Social History Narrative    Not on file     Social Determinants of Health     Financial Resource Strain: Not on file   Food Insecurity: Not on file   Transportation Needs: Not on file   Physical Activity: Not on file   Stress: Not on file   Social Connections: Not on file   Intimate Partner Violence: Not on file   Housing Stability: Not on file     Patient Pre-hospital Living Situation: Lives at home  Patient Pre-hospital Level of Mobility: Independent and mobile  Patient Pre-hospital Diet Restrictions: None    Family History:  Family History   Problem Relation Age of Onset    No Known Problems Mother     No Known Problems Father     Breast cancer Paternal Aunt 48     Physical Exam:   Vitals:   Blood Pressure: 128/75 (10/24/23 1743)  Pulse: 104 (10/24/23 1743)  Temperature: 97.9 °F (36.6 °C) (10/24/23 1743)  Temp Source: Oral (10/24/23 1244)  Respirations: 13 (10/24/23 1530)  Height: 5' 5" (165.1 cm) (10/24/23 1743)  Weight - Scale: 47.9 kg (105 lb 9.6 oz) (10/24/23 1743)  SpO2: 98 % (10/24/23 1743)    General appearance: alert, distracted, appears stated age, and cooperative  Constitutional- looks a little weak  HEENT - atraumatic and normocephalic  Neck- supple  Skin - no fresh rash  Extremities no fresh focal deformities  Cardiovascular- S1-S2 heard  Respiratory- bilateral air entry present, no crackles or rhonchi  Skin - no fresh rash  Abdomen - normal bowel sounds present, no rebound tenderness  CNS- No fresh focal deficits  Psych- no acute psychosis     Lab Results: I have personally reviewed pertinent reports.     Results from last 7 days   Lab Units 10/24/23  1300   WBC Thousand/uL 10.21*   HEMOGLOBIN g/dL 14.0   HEMATOCRIT % 40.2   PLATELETS Thousands/uL 464*   NEUTROS PCT % 79*   LYMPHS PCT % 15   MONOS PCT % 5   EOS PCT % 0     Results from last 7 days   Lab Units 10/24/23  1300 10/23/23  0705   SODIUM mmol/L 135 137   POTASSIUM mmol/L 3.5 4.5   CHLORIDE mmol/L 102 102   CO2 mmol/L 23 28   ANION GAP mmol/L 10 7   BUN mg/dL 12 12   CREATININE mg/dL 0.71 0.59*   CALCIUM mg/dL 9.2 8.8   ALBUMIN g/dL 4.1 4.4   TOTAL BILIRUBIN mg/dL 0.39 0.53   ALK PHOS U/L 48 54   ALT U/L 14 15   AST U/L 16 17   EGFR ml/min/1.73sq m 102 110   GLUCOSE RANDOM mg/dL 123  --                           Results from last 7 days   Lab Units 10/24/23  1308   COLOR UA  Light Yellow   CLARITY UA  Clear   SPEC GRAV UA  1.015   PH UA  6.5   LEUKOCYTES UA  Negative   NITRITE UA  Negative   GLUCOSE UA mg/dl Negative   KETONES UA mg/dl 10 (1+)*   UROBILINOGEN UA (BE) mg/dl <2.0   BILIRUBIN UA  Negative   BLOOD UA  Negative      Results from last 7 days   Lab Units 10/24/23  1308   RBC UA /hpf 1-2   WBC UA /hpf 1-2   EPITHELIAL CELLS WET PREP /hpf Occasional   BACTERIA UA /hpf Occasional          Imaging: I have personally reviewed pertinent reports. CT head without contrast    Result Date: 10/24/2023  Impression: 1. No acute intracranial hemorrhage or mass effect. No hydrocephalus. 2. Rounded 1 cm hypodense lesion in the right temporal lobe may represent a choroidal fissure cyst or prominent perivascular space. Other lesions not excluded. Follow-up contrast-enhanced MRI of the brain suggested for further characterization. Workstation performed: [id number]       EKG, Pathology, and Other Studies Reviewed on Admission:   EKG  Result Date: 10/24/23  Personally reviewed strips with impression of: No acute ST or T wave changes on the EKG as reviewed by me    Epic Records Reviewed: Yes    Yasmin Moy MD  [facility] Internal Medicine    ** Please Note: This note has been constructed using a voice recognition system.  **

## 2023-10-24 NOTE — ASSESSMENT & PLAN NOTE
Patient came in with hypomagnesemia with 1.6 on admission  Will give intravenous magnesium 4 g and recheck magnesium in a.m.   Discussed with endocrinology, Dr. Ashley Hansen and will give calcium replacement despite serum calcium being in the normal range

## 2023-10-24 NOTE — PLAN OF CARE

## 2023-10-24 NOTE — ASSESSMENT & PLAN NOTE
Patient does have acute metabolic encephalopathy and presents with signs of delirium on admission  Given recent total thyroidectomy for the medullary cancer of the thyroid, the most likely cause of her altered mental status is metabolic  Calcium levels are low normal but will give calcium supplementation, patient does have significant hypomagnesemia and will give intravenous magnesium supplementation  Patient does have ketones in her urine, the significance of that is unclear but may be related to limited oral intake  Will give olanzapine as needed for the patient.   If the patient remains delirious despite correction of all her metabolic abnormalities, will consider a psychiatry consult at that point of time

## 2023-10-24 NOTE — ED NOTES
Pt had episode of attempting to rip out IV and disconnecting wires. Provider at bedside.  Pt was able to be redirected by her son     Maye Robertson RN  10/24/23 [id number]

## 2023-10-24 NOTE — ED PROVIDER NOTES
History  Chief Complaint   Patient presents with    Altered Mental Status     Per  pt has been exp change in mental status for the past 48 hours or so. Pt had thyroidectomy on 10/11. Pt appears confused and disoriented to situation intermittently during triage. Altered Mental Status      Prior to Admission Medications   Prescriptions Last Dose Informant Patient Reported? Taking? Biotin 10 MG TABS  Self Yes No   Sig: Take by mouth   Calcium Carb-Cholecalciferol (calcium carbonate-vitamin D) 500 mg-5 mcg tablet   Yes No   Sig: PLEASE SEE ATTACHED FOR DETAILED DIRECTIONS   Multiple Vitamin (MULTIVITAMIN ADULT PO)  Self Yes No   Sig: Take 1 tablet by mouth daily   calcitriol (ROCALTROL) 0.25 mcg capsule   No No   Sig: Take 1 capsule (0.25 mcg total) by mouth if needed (Please contact the office if you experience any numbness or tingling of the fingers, toes, and mouth and we will tell you to take this medication.) for up to 21 days   calcium carbonate-vitamin D 500 mg-5 mcg per tablet   No No   Sig: Take 2 tablets by mouth 3 (three) times a day with meals for 7 days, THEN 2 tablets 2 (two) times a day with meals for 7 days, THEN 2 tablets daily with breakfast for 7 days. levothyroxine (Synthroid) 75 mcg tablet   No No   Sig: Take 1 tablet (75 mcg total) by mouth daily   magnesium Oxide (MAG-OX) 400 mg TABS   No No   Sig: Take 1 tablet (400 mg total) by mouth 2 (two) times a day for 21 days   magnesium oxide (MAG-OX) 400 mg tablet   Yes No   Sig: TAKE 1 TABLET (400 MG TOTAL) BY MOUTH 2 (TWO) TIMES A DAY FOR 21 DAYS      Facility-Administered Medications Last Administration Doses Remaining   EPINEPHrine (EPIPEN) injection 0.3 mg None recorded 2          History reviewed. No pertinent past medical history.     Past Surgical History:   Procedure Laterality Date    NH THYROIDECTOMY TOTAL/SUBTOTAL LMTD NECK DISSECT N/A 10/11/2023    Procedure: TOTAL THYROIDECTOMY, BILATERAL CENTRAL COMPARTMENT NECK DISSECTION WITH [facility];  Surgeon: Jonelle Serrano MD;  Location: [facility];  Service: ENT    US GUIDED THYROID BIOPSY  08/18/2023    WISDOM TOOTH EXTRACTION         Family History   Problem Relation Age of Onset    No Known Problems Mother     No Known Problems Father     Breast cancer Paternal Aunt 48     I have reviewed and agree with the history as documented. E-Cigarette/Vaping    E-Cigarette Use Never User      E-Cigarette/Vaping Substances    Nicotine No     THC No     CBD No     Flavoring No     Other No     Unknown No      Social History     Tobacco Use    Smoking status: Never    Smokeless tobacco: Never   Vaping Use    Vaping Use: Never used   Substance Use Topics    Alcohol use: No    Drug use: Never       Review of Systems    Physical Exam  Physical Exam  Vitals and nursing note reviewed. Constitutional:       General: She is not in acute distress. Appearance: She is well-developed. HENT:      Head: Normocephalic and atraumatic. Eyes:      Conjunctiva/sclera: Conjunctivae normal.      Pupils: Pupils are equal, round, and reactive to light. Neck:      Trachea: No tracheal deviation. Cardiovascular:      Rate and Rhythm: Normal rate and regular rhythm. Heart sounds: Normal heart sounds. Pulmonary:      Effort: Pulmonary effort is normal. No respiratory distress. Breath sounds: Normal breath sounds. Abdominal:      General: There is no distension. Palpations: Abdomen is soft. Tenderness: There is no abdominal tenderness. Musculoskeletal:      Cervical back: Normal range of motion. Skin:     General: Skin is warm and dry. Neurological:      Mental Status: She is alert and oriented to person, place, and time. GCS: GCS eye subscore is 4. GCS verbal subscore is 5. GCS motor subscore is 6. Cranial Nerves: No dysarthria or facial asymmetry. Sensory: Sensation is intact. Motor: No weakness.       Coordination: Finger-Nose-Finger Test normal.      Gait: Gait normal.      Comments: No Trousseau's of Chvostek's signs   Psychiatric:         Mood and Affect: Mood and affect normal.         Behavior: Behavior normal.         Vital Signs  ED Triage Vitals [10/24/23 1244]   Temperature Pulse Respirations Blood Pressure SpO2   97.6 °F (36.4 °C) (!) 128 20 141/66 98 %      Temp Source Heart Rate Source Patient Position - Orthostatic VS BP Location FiO2 (%)   Oral Monitor Sitting Right arm --      Pain Score       No Pain           Vitals:    10/24/23 1315 10/24/23 1330 10/24/23 1430 10/24/23 1530   BP: 123/59 125/68 139/72 133/67   Pulse: 99 105 (!) 115 (!) 110   Patient Position - Orthostatic VS:    Lying         Visual Acuity  Visual Acuity      Flowsheet Row Most Recent Value   L Pupil Size (mm) 3   R Pupil Size (mm) 3            ED Medications  Medications   ziprasidone (GEODON) IM injection 20 mg (20 mg Intramuscular Given 10/24/23 1427)   sterile water injection **ADS Override Pull** (10 mL  Given 10/24/23 1428)   magnesium Oxide (MAG-OX) tablet 800 mg (800 mg Oral Given 10/24/23 1528)       Diagnostic Studies  Results Reviewed       Procedure Component Value Units Date/Time    Rapid drug screen, urine [[id number]]  (Normal) Collected: 10/24/23 1308    Lab Status: Final result Specimen: Urine, Catheter Updated: 10/24/23 1346     Amph/Meth UR Negative     Barbiturate Ur Negative     Benzodiazepine Urine Negative     Cocaine Urine Negative     Methadone Urine Negative     Opiate Urine Negative     PCP Ur Negative     THC Urine Negative     Oxycodone Urine Negative    Narrative:      FOR MEDICAL PURPOSES ONLY. IF CONFIRMATION NEEDED PLEASE CONTACT THE LAB WITHIN 5 DAYS.     Drug Screen Cutoff Levels:  AMPHETAMINE/METHAMPHETAMINES  1000 ng/mL  BARBITURATES     200 ng/mL  BENZODIAZEPINES     200 ng/mL  COCAINE      300 ng/mL  METHADONE      300 ng/mL  OPIATES      300 ng/mL  PHENCYCLIDINE     25 ng/mL  THC       50 ng/mL  OXYCODONE      100 ng/mL    HS Troponin 0hr (reflex protocol) [[id number]]  (Normal) Collected: 10/24/23 1300    Lab Status: Final result Specimen: Blood from Arm, Right Updated: 10/24/23 1334     hs TnI 0hr 4 ng/L     Basic metabolic panel [[id number]] Collected: 10/24/23 1300    Lab Status: Final result Specimen: Blood from Arm, Right Updated: 10/24/23 1324     Sodium 135 mmol/L      Potassium 3.5 mmol/L      Chloride 102 mmol/L      CO2 23 mmol/L      ANION GAP 10 mmol/L      BUN 12 mg/dL      Creatinine 0.71 mg/dL      Glucose 123 mg/dL      Calcium 9.2 mg/dL      eGFR 102 ml/min/1.73sq m     Narrative:      [place] guidelines for Chronic Kidney Disease (CKD):     Stage 1 with normal or high GFR (GFR > 90 mL/min/1.73 square meters)    Stage 2 Mild CKD (GFR = 60-89 mL/min/1.73 square meters)    Stage 3A Moderate CKD (GFR = 45-59 mL/min/1.73 square meters)    Stage 3B Moderate CKD (GFR = 30-44 mL/min/1.73 square meters)    Stage 4 Severe CKD (GFR = 15-29 mL/min/1.73 square meters)    Stage 5 End Stage CKD (GFR <15 mL/min/1.73 square meters)  Note: GFR calculation is accurate only with a steady state creatinine    Hepatic function panel [[id number]]  (Normal) Collected: 10/24/23 1300    Lab Status: Final result Specimen: Blood from Arm, Right Updated: 10/24/23 1324     Total Bilirubin 0.39 mg/dL      Bilirubin, Direct 0.08 mg/dL      Alkaline Phosphatase 48 U/L      AST 16 U/L      ALT 14 U/L      Total Protein 7.0 g/dL      Albumin 4.1 g/dL     Magnesium [[id number]]  (Abnormal) Collected: 10/24/23 1300    Lab Status: Final result Specimen: Blood from Arm, Right Updated: 10/24/23 1324     Magnesium 1.6 mg/dL     Acetaminophen level-"If concentration is detectable, please discuss with medical  on call." [[id number]]  (Abnormal) Collected: 10/24/23 1300    Lab Status: Final result Specimen: Blood from Arm, Right Updated: 10/24/23 1324     Acetaminophen Level <96 ug/mL     Salicylate level [[id number]]  (Normal) Collected: 10/24/23 1300    Lab Status: Final result Specimen: Blood from Arm, Right Updated: 17/34/91 1159     Salicylate Lvl <5 mg/dL     Ethanol [[id number]]  (Normal) Collected: 10/24/23 1300    Lab Status: Final result Specimen: Blood from Arm, Right Updated: 10/24/23 1322     Ethanol Lvl <10 mg/dL     Urine Microscopic [[id number]]  (Abnormal) Collected: 10/24/23 1308    Lab Status: Final result Specimen: Urine, Clean Catch Updated: 10/24/23 1322     RBC, UA 1-2 /hpf      WBC, UA 1-2 /hpf      Epithelial Cells Occasional /hpf      Bacteria, UA Occasional /hpf      MUCUS THREADS Occasional    UA w Reflex to Microscopic w Reflex to Culture [[id number]]  (Abnormal) Collected: 10/24/23 1308    Lab Status: Final result Specimen: Urine, Clean Catch Updated: 10/24/23 1321     Color, UA Light Yellow     Clarity, UA Clear     Specific Gravity, UA 1.015     pH, UA 6.5     Leukocytes, UA Negative     Nitrite, UA Negative     Protein, UA Trace mg/dl      Glucose, UA Negative mg/dl      Ketones, UA 10 (1+) mg/dl      Urobilinogen, UA <2.0 mg/dl      Bilirubin, UA Negative     Occult Blood, UA Negative    Calcium, ionized [[id number]]  (Abnormal) Collected: 10/24/23 1300    Lab Status: Final result Specimen: Blood from Arm, Right Updated: 10/24/23 1308     Calcium, Ionized 1.11 mmol/L     CBC and differential [[id number]]  (Abnormal) Collected: 10/24/23 1300    Lab Status: Final result Specimen: Blood from Arm, Right Updated: 10/24/23 1307     WBC 10.21 Thousand/uL      RBC 4.20 Million/uL      Hemoglobin 14.0 g/dL      Hematocrit 40.2 %      MCV 96 fL      MCH 33.3 pg      MCHC 34.8 g/dL      RDW 12.5 %      MPV 10.2 fL      Platelets 829 Thousands/uL      nRBC 0 /100 WBCs      Neutrophils Relative 79 %      Immat GRANS % 0 %      Lymphocytes Relative 15 %      Monocytes Relative 5 %      Eosinophils Relative 0 %      Basophils Relative 1 %      Neutrophils Absolute 8.05 Thousands/µL      Immature Grans Absolute 0.01 Thousand/uL      Lymphocytes Absolute 1.55 Thousands/µL      Monocytes Absolute 0.54 Thousand/µL      Eosinophils Absolute 0.00 Thousand/µL      Basophils Absolute 0.06 Thousands/µL     Blood gas, venous [[id number]]  (Abnormal) Collected: 10/24/23 1300    Lab Status: Final result Specimen: Blood from Arm, Right Updated: 10/24/23 1307     pH, Satish 7.420     pCO2, Satish 33.0 mm Hg      pO2, Satish 63.0 mm Hg      HCO3, Satish 20.9 mmol/L      Base Excess, Satish -2.7 mmol/L      O2 Content, Satish 18.2 ml/dL      O2 HGB, VENOUS 91.4 %     Fingerstick Glucose (POCT) [[id number]]  (Normal) Collected: 10/24/23 1301    Lab Status: Final result Updated: 10/24/23 1302     POC Glucose 124 mg/dl                    CT head without contrast   Final Result by Mandie Cano MD (10/24 3288)         1. No acute intracranial hemorrhage or mass effect. No hydrocephalus. 2. Rounded 1 cm hypodense lesion in the right temporal lobe may represent a choroidal fissure cyst or prominent perivascular space. Other lesions not excluded. Follow-up contrast-enhanced MRI of the brain suggested for further characterization.                   Workstation performed: [id number]                    Procedures  ECG 12 Lead Documentation Only    Date/Time: 10/24/2023 1:14 PM    Performed by: Jayme Kaplan MD  Authorized by: Jayme Kaplan MD    Indications / Diagnosis:  AMS  ECG reviewed by me, the ED Provider: yes    Patient location:  ED  Previous ECG:     Previous ECG:  Unavailable  Interpretation:     Interpretation: non-specific    Rate:     ECG rate:  107    ECG rate assessment: tachycardic    Rhythm:     Rhythm: sinus tachycardia    Ectopy:     Ectopy: none    QRS:     QRS axis:  Right    QRS intervals:  Normal  Conduction:     Conduction: normal    ST segments:     ST segments:  Normal  T waves:     T waves: normal             ED Course                               SBIRT 20yo+      Flowsheet Row Most Recent Value   Initial Alcohol Screen: US AUDIT-C     1. How often do you have a drink containing alcohol? 0 Filed at: 10/24/2023 1246   2. How many drinks containing alcohol do you have on a typical day you are drinking? 0 Filed at: 10/24/2023 1246   3a. Male UNDER 65: How often do you have five or more drinks on one occasion? 0 Filed at: 10/24/2023 1246   3b. FEMALE Any Age, or MALE 65+: How often do you have 4 or more drinks on one occassion? 0 Filed at: 10/24/2023 1246   Audit-C Score 0 Filed at: 10/24/2023 1246   JACQUELYN: How many times in the past year have you. .. Used an illegal drug or used a prescription medication for non-medical reasons? Never Filed at: 10/24/2023 1246                      Medical Decision Making  This is a 27-year-old female who presents here today with altered mental status. She denies any complaints. She endorses a recent thyroid surgery but says she has been doing well and taking her medications as prescribed. She denies any complaints or concerns. She denies any numbness, paresthesias, weakness, muscle cramping or spasming, palpitations, changes in her bowels, other symptoms related to hypocalcemia. Family says she has been off for the past 2 to 3 days. She has periods of time where she is fine and then suddenly gets confused and agitated. He states she has had a hard time sleeping, only sleeping for about 10 minutes at a time over the past 2 days. They deny falls or trauma, fevers or infectious symptoms. They deny underlying medical problems since that she has been taking her medications and when they reached out to her surgeon today because of symptoms were told that so far things looked okay but that some labs are still pending. They deny new medications or changes in medications. The patient denies any other over-the-counter medication use. They deny prior similar symptoms.  She had outpatient lab work done yesterday that showed calcium of 8.8, normal PTH of 44, TSH mildly elevated at 8.626, but T4 normal at 0.86.  She saw endocrinology on 9/13/2023, and given elevation of calcitonin and CEA there was concern for possible metastatic disease. There is a visit note from her PCP 7/18/2023 mentioning worsening depressive symptoms over several months for which she was started on Prozac. The patient  says she only took this for a couple of weeks and did not continue to have problems. The endocrine note mentions that she took Prozac for 2 weeks and stopped it because she did not feel better. She had a CT scan of the chest done 9/19 that showed mass of the thyroid but no other abnormalities. CT scan soft tissue of the neck showed similar mass, nonspecific areas of T2 lamina, and C3 and C4 vertebral bodies for which further characterization is recommended with MRI versus PET scan. She did have a CT scan of her abdomen ordered by endocrinology, however this has not yet been done. ROS: Otherwise negative, unless stated as above. She is well-appearing, in no acute distress. She is oriented x3, though does not know why she is in the hospital or what happened that had her family concerned to bring her to the hospital.  She is mildly tachycardic. She has well-healing surgical incision without signs of infection. Exam is otherwise unremarkable. Hypocalcium Lulu is less likely given normal labs yesterday, however will check ionized calcium to confirm. Given normal T4, I am not concerned about thyroid abnormality contributing to symptoms. She may have other infection, electrolyte abnormality, dehydration, LAN, metastases to her brain, drug or alcohol use. Rapid cycling of mental status is less likely to be due to depression. We will check lab work and CT scan to evaluate. Lab work shows minimal leukocytosis of 10.2 and platelet count of 109, of uncertain etiology or significance. She is hypomagnesemic to 1.6. Ionized calcium is 1. 11.   She is not hypercarbic; PCO2 is actually slightly low but she is not alkalemic. She is not tachypneic or hyperventilating. Lab work is otherwise unremarkable. CT scan shows no significant acute abnormalities. She has a hypodense lesion that could be cyst versus prominent perivascular space but cannot exclude other lesions. With recent concern for possible cancer this could be metastatic lesion to the brain, versus benign cyst.  She has had periods where she is fine, still is not why she is in the hospital, and periods where she is confused, upset, trying to take off her EKG leads, removed for IV, taking off her gown and walking out of the hospital.  She is insisting she is fine, but does not answer questions coherently, yelling at staff and family. She is unable to answer questions regarding what caused her upset or where she is trying to go other than that she needs to "leave now."  She was given IM Geodon to help with anxiety and to allow her to more easily cooperate with staff and testing. Given rapid cycling of mental status without obvious etiology, she will require admission for further evaluation. I did update patient family on findings and plan of care, and they expressed understanding. Problems Addressed:  Delirium: acute illness or injury that poses a threat to life or bodily functions  H/O thyroidectomy: chronic illness or injury  Hypomagnesemia: acute illness or injury  Sinus tachycardia: acute illness or injury    Amount and/or Complexity of Data Reviewed  Independent Historian: spouse  External Data Reviewed: labs, radiology and notes. Labs: ordered. Decision-making details documented in ED Course. Radiology: ordered and independent interpretation performed. Decision-making details documented in ED Course. ECG/medicine tests: ordered and independent interpretation performed. Decision-making details documented in ED Course. Risk  OTC drugs. Prescription drug management. Decision regarding hospitalization.              Disposition  Final diagnoses: Delirium   Hypomagnesemia   Sinus tachycardia     Time reflects when diagnosis was documented in both MDM as applicable and the Disposition within this note       Time User Action Codes Description Comment    10/24/2023  3:30 PM Aren Heath Add [R41.0] Delirium     10/24/2023  3:30 PM Aren Heath Add [E83.42] Hypomagnesemia     10/24/2023  3:30 PM Aren Heath Add [R00.0] Sinus tachycardia           ED Disposition       ED Disposition   Admit    Condition   Stable    Date/Time   Tue Oct 24, 2023 7140    Comment   Case was discussed with Dr Sohail Tejada and the patient's admission status was agreed to be Admission Status: observation status to the service of Dr. Sohail Tejada. Follow-up Information    None         Patient's Medications   Discharge Prescriptions    No medications on file       No discharge procedures on file.     PDMP Review       None            ED Provider  Electronically Signed by             Guerline Gallagher MD  10/24/23 4443

## 2023-10-24 NOTE — ASSESSMENT & PLAN NOTE
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

## 2023-10-24 NOTE — DISCHARGE SUMMARY
Discharge Summary - Joseph Castellon 56 y.o. female MRN: [id number]    Unit/Bed#: S -28 Encounter: [id number]    Admission Date:   Admission Orders (From admission, onward)       Ordered        10/13/23 0809  Inpatient Admission  Once                            Admitting Diagnosis: Medullary thyroid carcinoma ([address]) [C73]    HPI: admitted for surgery for medullary thyroid carcinoma    Procedures Performed: No orders of the defined types were placed in this encounter. Summary of Hospital Course: uncomplicated    Significant Findings, Care, Treatment and Services Provided: surgery - total thyroidectomy and central compartment neck dissection    Complications: none    Discharge Diagnosis: medullary thyroid cancer, hypocalcemia    Medical Problems       Resolved Problems  Date Reviewed: 7/18/2023   None         Condition at Discharge: good         Discharge instructions/Information to patient and family:   See after visit summary for information provided to patient and family. Provisions for Follow-Up Care:  See after visit summary for information related to follow-up care and any pertinent home health orders. PCP: JERMAINE Fowler    Disposition: Home    Planned Readmission: No      Discharge Statement   I spent 30 minutes discharging the patient. This time was spent on the day of discharge. I had direct contact with the patient on the day of discharge. Additional documentation is required if more than 30 minutes were spent on discharge. Discharge Medications:  See after visit summary for reconciled discharge medications provided to patient and family.

## 2023-10-25 LAB
ALBUMIN SERPL BCP-MCNC: 4.1 G/DL (ref 3.5–5)
ALP SERPL-CCNC: 46 U/L (ref 34–104)
ALT SERPL W P-5'-P-CCNC: 13 U/L (ref 7–52)
ANION GAP SERPL CALCULATED.3IONS-SCNC: 7 MMOL/L
AST SERPL W P-5'-P-CCNC: 16 U/L (ref 13–39)
ATRIAL RATE: 107 BPM
ATRIAL RATE: 111 BPM
BASOPHILS # BLD AUTO: 0.05 THOUSANDS/ÂΜL (ref 0–0.1)
BASOPHILS NFR BLD AUTO: 1 % (ref 0–1)
BILIRUB SERPL-MCNC: 0.48 MG/DL (ref 0.2–1)
BUN SERPL-MCNC: 10 MG/DL (ref 5–25)
CALCIUM SERPL-MCNC: 8.8 MG/DL (ref 8.4–10.2)
CHLORIDE SERPL-SCNC: 106 MMOL/L (ref 96–108)
CO2 SERPL-SCNC: 26 MMOL/L (ref 21–32)
CREAT SERPL-MCNC: 0.59 MG/DL (ref 0.6–1.3)
EOSINOPHIL # BLD AUTO: 0.02 THOUSAND/ÂΜL (ref 0–0.61)
EOSINOPHIL NFR BLD AUTO: 0 % (ref 0–6)
ERYTHROCYTE [DISTWIDTH] IN BLOOD BY AUTOMATED COUNT: 12.5 % (ref 11.6–15.1)
GFR SERPL CREATININE-BSD FRML MDRD: 110 ML/MIN/1.73SQ M
GLUCOSE SERPL-MCNC: 102 MG/DL (ref 65–140)
HCT VFR BLD AUTO: 37.6 % (ref 34.8–46.1)
HGB BLD-MCNC: 12.5 G/DL (ref 11.5–15.4)
IMM GRANULOCYTES # BLD AUTO: 0.03 THOUSAND/UL (ref 0–0.2)
IMM GRANULOCYTES NFR BLD AUTO: 0 % (ref 0–2)
LYMPHOCYTES # BLD AUTO: 2.09 THOUSANDS/ÂΜL (ref 0.6–4.47)
LYMPHOCYTES NFR BLD AUTO: 26 % (ref 14–44)
MAGNESIUM SERPL-MCNC: 2.1 MG/DL (ref 1.9–2.7)
MCH RBC QN AUTO: 31.9 PG (ref 26.8–34.3)
MCHC RBC AUTO-ENTMCNC: 33.2 G/DL (ref 31.4–37.4)
MCV RBC AUTO: 96 FL (ref 82–98)
MONOCYTES # BLD AUTO: 0.36 THOUSAND/ÂΜL (ref 0.17–1.22)
MONOCYTES NFR BLD AUTO: 4 % (ref 4–12)
NEUTROPHILS # BLD AUTO: 5.58 THOUSANDS/ÂΜL (ref 1.85–7.62)
NEUTS SEG NFR BLD AUTO: 69 % (ref 43–75)
NRBC BLD AUTO-RTO: 0 /100 WBCS
P AXIS: 83 DEGREES
P AXIS: 83 DEGREES
PHOSPHATE SERPL-MCNC: 3.1 MG/DL (ref 2.7–4.5)
PLATELET # BLD AUTO: 407 THOUSANDS/UL (ref 149–390)
PMV BLD AUTO: 9.9 FL (ref 8.9–12.7)
POTASSIUM SERPL-SCNC: 3.8 MMOL/L (ref 3.5–5.3)
PR INTERVAL: 136 MS
PR INTERVAL: 142 MS
PROT SERPL-MCNC: 7.2 G/DL (ref 6.4–8.4)
QRS AXIS: 94 DEGREES
QRS AXIS: 95 DEGREES
QRSD INTERVAL: 66 MS
QRSD INTERVAL: 66 MS
QT INTERVAL: 338 MS
QT INTERVAL: 346 MS
QTC INTERVAL: 459 MS
QTC INTERVAL: 461 MS
RBC # BLD AUTO: 3.92 MILLION/UL (ref 3.81–5.12)
SODIUM SERPL-SCNC: 139 MMOL/L (ref 135–147)
T WAVE AXIS: 57 DEGREES
T WAVE AXIS: 61 DEGREES
VENTRICULAR RATE: 107 BPM
VENTRICULAR RATE: 111 BPM
WBC # BLD AUTO: 8.13 THOUSAND/UL (ref 4.31–10.16)

## 2023-10-25 PROCEDURE — 85025 COMPLETE CBC W/AUTO DIFF WBC: CPT | Performed by: INTERNAL MEDICINE

## 2023-10-25 PROCEDURE — 99254 IP/OBS CNSLTJ NEW/EST MOD 60: CPT | Performed by: PSYCHIATRY & NEUROLOGY

## 2023-10-25 PROCEDURE — 83735 ASSAY OF MAGNESIUM: CPT | Performed by: INTERNAL MEDICINE

## 2023-10-25 PROCEDURE — 93010 ELECTROCARDIOGRAM REPORT: CPT | Performed by: INTERNAL MEDICINE

## 2023-10-25 PROCEDURE — 99232 SBSQ HOSP IP/OBS MODERATE 35: CPT | Performed by: NURSE PRACTITIONER

## 2023-10-25 PROCEDURE — 84100 ASSAY OF PHOSPHORUS: CPT | Performed by: INTERNAL MEDICINE

## 2023-10-25 PROCEDURE — 80053 COMPREHEN METABOLIC PANEL: CPT | Performed by: INTERNAL MEDICINE

## 2023-10-25 PROCEDURE — 99222 1ST HOSP IP/OBS MODERATE 55: CPT | Performed by: INTERNAL MEDICINE

## 2023-10-25 RX ORDER — HALOPERIDOL 5 MG/ML
2 INJECTION INTRAMUSCULAR ONCE
Status: COMPLETED | OUTPATIENT
Start: 2023-10-25 | End: 2023-10-25

## 2023-10-25 RX ORDER — LEVOTHYROXINE SODIUM 88 UG/1
88 TABLET ORAL
Status: DISCONTINUED | OUTPATIENT
Start: 2023-10-26 | End: 2023-10-28 | Stop reason: HOSPADM

## 2023-10-25 RX ADMIN — Medication 400 MG: at 10:16

## 2023-10-25 RX ADMIN — CALCITRIOL CAPSULES 0.25 MCG 0.25 MCG: 0.25 CAPSULE ORAL at 10:16

## 2023-10-25 RX ADMIN — Medication 1 TABLET: at 19:00

## 2023-10-25 RX ADMIN — OLANZAPINE 5 MG: 5 TABLET, ORALLY DISINTEGRATING ORAL at 14:25

## 2023-10-25 RX ADMIN — OLANZAPINE 5 MG: 5 TABLET, ORALLY DISINTEGRATING ORAL at 20:20

## 2023-10-25 RX ADMIN — Medication 1 TABLET: at 10:16

## 2023-10-25 RX ADMIN — Medication 1 TABLET: at 14:23

## 2023-10-25 RX ADMIN — HALOPERIDOL LACTATE 2 MG: 5 INJECTION INTRAMUSCULAR at 22:33

## 2023-10-25 RX ADMIN — Medication 400 MG: at 19:00

## 2023-10-25 RX ADMIN — SODIUM CHLORIDE, SODIUM GLUCONATE, SODIUM ACETATE, POTASSIUM CHLORIDE, MAGNESIUM CHLORIDE, SODIUM PHOSPHATE, DIBASIC, AND POTASSIUM PHOSPHATE 75 ML/HR: .53; .5; .37; .037; .03; .012; .00082 INJECTION, SOLUTION INTRAVENOUS at 19:56

## 2023-10-25 RX ADMIN — LEVOTHYROXINE SODIUM 75 MCG: 75 TABLET ORAL at 06:11

## 2023-10-25 RX ADMIN — HALOPERIDOL LACTATE 2 MG: 5 INJECTION, SOLUTION INTRAMUSCULAR at 22:20

## 2023-10-25 NOTE — CONSULTS
Consultation - [facility]    Patient Information: Kris Soria 56 y.o. female MRN: [id number]  Unit/Bed#: -01 Encounter: [id number]  Admitting Physician: MD Julia  PCP: JERMAINE Jesus  Date of Consultation:  10/25/23    ASSESSMENT:      PLAN:    1. Acute change in mental status. No clear medical etiology identified. Patient does not acknowledge that she has a problem. The slight lab errors are unlikely to explain her behavioral change. Recommend psychiatric input. From endocrine aspect, since we already have known medullary thyroid CA, suggest brain imaging to r/o metastasis. Then. .    will consider repeating a 24-hour urine collection to include 5-HIAA, catecholamines, metanephrines, dopamine, VMA etc.  R/o hypercortisolism but could also be psuedocushing's. Will optimize thyroid replacement. Rule out perimenopausal state that may exacerbate underlying psychoses. Oxytocin levels have been associated with depression but not clinically relevant as low oxytocin state is unknown without panhypopituitarism. Further review may be done as outpatient but I do not see an endocrine connection. 2. Post surgical hypothyroidism. Increase to levothyroxine 88mcg daily as TSH was high. 3. Medullary thyroid CA. S/p resection. Baseline calcitonin was 83427vh/mL suggestive of metastatic disease. She may follow hemonc as outpt with monitoring of CEA and calcitonin levels. Rare neuroendocrine paraneoplastic syndromes are possible. Total time spent was 43 min of which >50% was in coordination of care.       Reason for consultation:   post op thyroidectomy    History of Present Illness:  Kris Soria is a 56 y.o. female with Hx medullary thyroid cancer [RET negative] s/p total thyroidectomy with lymph node dissection 10/11/2023 [surgical pathology showed a 2.9 cm MTC right thyroid lobe with amyloid deposits in positive for synaptophysin/chromogranin/TTF-1; 1 benign parathyroid identified; 0.5cm papillary micro-carcinoma Lt thyroid], low BMI, postsurgical hypothyroidism and anxiety. She was brought into the hospital with acute change in mental status in the form of delirium reported by family, abrupt behavioral changes, episodes of hyperactivity followed by slumping, mild paranoid ideation and memory impairment. At presentation ionized calcium was low end of normal.  Magnesium was low. Prior 24-hour urine for catecholamines was unremarkable 9/5/2023. Levothyroxine 75mcg daily she admits to compliance. Calcitriol 0.25mcg BID      Review of Systems:    Review of Systems   Reason unable to perform ROS: unable to perform a full ROS. Past Medical and Surgical History:     History reviewed. No pertinent past medical history. Past Surgical History:   Procedure Laterality Date    VT THYROIDECTOMY TOTAL/SUBTOTAL LMTD NECK DISSECT N/A 10/11/2023    Procedure: TOTAL THYROIDECTOMY, BILATERAL CENTRAL COMPARTMENT NECK DISSECTION WITH NIMS;  Surgeon: Tyler Hedrick MD;  Location[facility];  Service: ENT    US GUIDED THYROID BIOPSY  08/18/2023    WISDOM TOOTH EXTRACTION         Meds/Allergies:    PTA meds:   Prior to Admission Medications   Prescriptions Last Dose Informant Patient Reported? Taking?    Biotin 10 MG TABS  Self Yes No   Sig: Take by mouth   Calcium Carb-Cholecalciferol (calcium carbonate-vitamin D) 500 mg-5 mcg tablet Not Taking  Yes No   Sig: PLEASE SEE ATTACHED FOR DETAILED DIRECTIONS   Patient not taking: Reported on 10/24/2023   Multiple Vitamin (MULTIVITAMIN ADULT PO) Not Taking Self Yes No   Sig: Take 1 tablet by mouth daily   Patient not taking: Reported on 10/24/2023   calcitriol (ROCALTROL) 0.25 mcg capsule Not Taking  No No   Sig: Take 1 capsule (0.25 mcg total) by mouth if needed (Please contact the office if you experience any numbness or tingling of the fingers, toes, and mouth and we will tell you to take this medication.) for up to 21 days   Patient not taking: Reported on 10/24/2023   calcium carbonate-vitamin D 500 mg-5 mcg per tablet  Spouse/Significant Other No No   Sig: Take 2 tablets by mouth 3 (three) times a day with meals for 7 days, THEN 2 tablets 2 (two) times a day with meals for 7 days, THEN 2 tablets daily with breakfast for 7 days. Patient taking differently: Take 1 tablets by mouth 2 (three) times a day with meals for 7 days, THEN 2 tablets daily with breakfast for 7 days.    levothyroxine (Synthroid) 75 mcg tablet   No No   Sig: Take 1 tablet (75 mcg total) by mouth daily   magnesium Oxide (MAG-OX) 400 mg TABS Not Taking  No No   Sig: Take 1 tablet (400 mg total) by mouth 2 (two) times a day for 21 days   Patient not taking: Reported on 10/24/2023   magnesium oxide (MAG-OX) 400 mg tablet Not Taking  Yes No   Sig: TAKE 1 TABLET (400 MG TOTAL) BY MOUTH 2 (TWO) TIMES A DAY FOR 21 DAYS   Patient not taking: Reported on 10/24/2023      Facility-Administered Medications Last Administration Doses Remaining   EPINEPHrine (EPIPEN) injection 0.3 mg None recorded 2          Allergies: No Known Allergies  History:     Marital Status: /Civil Union   Occupation:   Substance Use History:   Social History     Substance and Sexual Activity   Alcohol Use Never     Social History     Tobacco Use   Smoking Status Never   Smokeless Tobacco Never     Social History     Substance and Sexual Activity   Drug Use Never       Family History:    Family History   Problem Relation Age of Onset    No Known Problems Mother     No Known Problems Father     Breast cancer Paternal Aunt 48       Physical Exam:     Vitals:   Blood Pressure: 132/77 (10/25/23 0806)  Pulse: 90 (10/25/23 0806)  Temperature: 97.9 °F (36.6 °C) (10/25/23 0806)  Temp Source: Oral (10/25/23 0806)  Respirations: 15 (10/24/23 2300)  Height: 5' 5" (165.1 cm) (10/24/23 1743)  Weight - Scale: 47.9 kg (105 lb 9.6 oz) (10/24/23 1743)  SpO2: 96 % (10/25/23 0806)    Physical Exam  Constitutional:       General: She is not in acute distress. Appearance: She is well-developed. Comments: Low bmi/cachexia   HENT:      Head: Normocephalic and atraumatic. Nose: Nose normal.   Eyes:      Conjunctiva/sclera: Conjunctivae normal.   Pulmonary:      Effort: Pulmonary effort is normal.   Abdominal:      General: There is no distension. Musculoskeletal:      Cervical back: Normal range of motion and neck supple. Skin:     Findings: No rash. Comments: No icterus   Neurological:      Mental Status: She is alert and oriented to person, place, and time. Lab and Imaging Results: I have personally reviewed pertinent reports. Results from last 7 days   Lab Units 10/25/23  0839   WBC Thousand/uL 8.13   HEMOGLOBIN g/dL 12.5   HEMATOCRIT % 37.6   PLATELETS Thousands/uL 407*   NEUTROS PCT % 69   LYMPHS PCT % 26   MONOS PCT % 4   EOS PCT % 0     Results from last 7 days   Lab Units 10/25/23  0839   POTASSIUM mmol/L 3.8   CHLORIDE mmol/L 106   CO2 mmol/L 26   BUN mg/dL 10   CREATININE mg/dL 0.59*   CALCIUM mg/dL 8.8   ALK PHOS U/L 46   ALT U/L 13   AST U/L 16         POC Glucose (mg/dl)   Date Value   10/24/2023 124         ** Please Note: Dragon 360 Dictation voice to text software may have been used in the creation of this document.  **

## 2023-10-25 NOTE — CASE MANAGEMENT
Case Management Progress Note    Patient name Gaytari Dickinson  Location [address] [facility] with [facility] 211/-01 MRN [id number]  : 1975 Date 10/25/2023       LOS (days): 1  Geometric Mean LOS (GMLOS) (days):   Days to GMLOS:        OBJECTIVE:        Current admission status: Inpatient  Preferred Pharmacy:   [facility]/pharmacy #[id number]- [facility] [address] - [address] [address]  Phone: [phone] Fax: [phone]    Primary Care Provider: JERMAINE Lux    Primary Insurance: [employer]  Secondary Insurance:     PROGRESS NOTE:  Patient reviewed during Interdisciplinary Rounds with [facility] today. Anticipated for d/c in 24-48hrs. Salvador Elms is 19. CM assessment pending. CM will continue to follow for d/c planning.

## 2023-10-25 NOTE — ASSESSMENT & PLAN NOTE
Patient presented to the ED with complaints of change in mental status over the last 48 hours prior to admission. She is s/p thyroidectomy on 10/11 in setting of medullary thyroid carcinoma. On admission, noted to be disoriented, confused.   Noted to have a TSH of 10, Ionized calcium level of 1.11  VBG completed on admission, noted to have partially compensated respiratory alkalosis noted with pH 7.420, CO2 33, HCO 20.9  Neurochecks

## 2023-10-25 NOTE — PLAN OF CARE

## 2023-10-25 NOTE — ASSESSMENT & PLAN NOTE
Present on admission, evidenced by a magnesium level of 1.6  Repleted with 4 g  Magnesium level pending for today.

## 2023-10-25 NOTE — PROGRESS NOTES
[address]  Progress Note  Name: Joe Lopez  MRN: [id number]  Unit/Bed#: -01 I Date of Admission: 10/24/2023   Date of Service: 10/25/2023 I Hospital Day: 1    Assessment/Plan   * Acute metabolic encephalopathy  Assessment & Plan  Patient presented to the ED with complaints of change in mental status over the last 48 hours prior to admission. She is s/p thyroidectomy on 10/11 in setting of medullary thyroid carcinoma. On admission, noted to be disoriented, confused. Noted to have a TSH of 10, Ionized calcium level of 1.11  VBG completed on admission, noted to have partially compensated respiratory alkalosis noted with pH 7.420, CO2 33, HCO 20.9  Neurochecks    Hypomagnesemia  Assessment & Plan  Present on admission, evidenced by a magnesium level of 1.6  Repleted with 4 g  Magnesium level pending for today. Hypothyroidism  Assessment & Plan  On levothyroxine    Medullary thyroid carcinoma [facility])  Assessment & Plan  Patient is s/p total thyroidectomy for medullary cancer of the thyroid  Continue levothyroxine 75 mcg daily  Endocrinology consulted for the patient and we will closely follow           VTE Pharmacologic Prophylaxis:   Moderate Risk (Score 3-4) - Pharmacological DVT Prophylaxis Ordered: enoxaparin (Lovenox). Patient Centered Rounds: I performed bedside rounds with nursing staff today. Discussions with Specialists or Other Care Team Provider: Case management    Education and Discussions with Family / Patient: Updated  ( and son) at bedside. Total Time Spent on Date of Encounter in care of patient: 32 mins.  This time was spent on one or more of the following: performing physical exam; counseling and coordination of care; obtaining or reviewing history; documenting in the medical record; reviewing/ordering tests, medications or procedures; communicating with other healthcare professionals and discussing with patient's family/caregivers. Current Length of Stay: 1 day(s)  Current Patient Status: Inpatient   Certification Statement: The patient will continue to require additional inpatient hospital stay due to ongoing treatment in setting of acute encephalopathy, bizarre behavior, need for eval with endocrinology/psych. Discharge Plan: Anticipate discharge in 24-48 hrs to home with home services. Code Status: Level 1 - Full Code    Subjective:   Patient resting in bed, has no complaints. Reports she is feeling better, but family has significant concerns regarding her behavior and mood. Reporting that she is having extreme mood swings, that vary from minute to minute. Objective:     Vitals:   Temp (24hrs), Av.9 °F (36.6 °C), Min:97.6 °F (36.4 °C), Max:98.2 °F (36.8 °C)    Temp:  [97.6 °F (36.4 °C)-98.2 °F (36.8 °C)] 97.9 °F (36.6 °C)  HR:  [] 90  Resp:  [13-20] 15  BP: (123-141)/(59-77) 132/77  SpO2:  [96 %-98 %] 96 %  Body mass index is 17.57 kg/m². Input and Output Summary (last 24 hours): Intake/Output Summary (Last 24 hours) at 10/25/2023 1224  Last data filed at 10/25/2023 0007  Gross per 24 hour   Intake 450 ml   Output --   Net 450 ml       Physical Exam:  Physical Exam  Vitals and nursing note reviewed. Constitutional:       General: She is not in acute distress. Appearance: She is normal weight. She is not ill-appearing. Cardiovascular:      Rate and Rhythm: Normal rate. Pulses: Normal pulses. Heart sounds: Normal heart sounds. Pulmonary:      Effort: Pulmonary effort is normal.      Breath sounds: Normal breath sounds. Abdominal:      General: Bowel sounds are normal.      Palpations: Abdomen is soft. Musculoskeletal:         General: Normal range of motion. Skin:     General: Skin is warm and dry. Capillary Refill: Capillary refill takes less than 2 seconds. Neurological:      Mental Status: She is alert and oriented to person, place, and time.    Psychiatric: Mood and Affect: Mood normal.         Speech: Speech is rapid and pressured. Behavior: Behavior is hyperactive. Judgment: Judgment is impulsive. Additional Data:     Labs:  Results from last 7 days   Lab Units 10/25/23  0839   WBC Thousand/uL 8.13   HEMOGLOBIN g/dL 12.5   HEMATOCRIT % 37.6   PLATELETS Thousands/uL 407*   NEUTROS PCT % 69   LYMPHS PCT % 26   MONOS PCT % 4   EOS PCT % 0     Results from last 7 days   Lab Units 10/25/23  0839   SODIUM mmol/L 139   POTASSIUM mmol/L 3.8   CHLORIDE mmol/L 106   CO2 mmol/L 26   BUN mg/dL 10   CREATININE mg/dL 0.59*   ANION GAP mmol/L 7   CALCIUM mg/dL 8.8   ALBUMIN g/dL 4.1   TOTAL BILIRUBIN mg/dL 0.48   ALK PHOS U/L 46   ALT U/L 13   AST U/L 16   GLUCOSE RANDOM mg/dL 102         Results from last 7 days   Lab Units 10/24/23  1301   POC GLUCOSE mg/dl 124               Lines/Drains:  Invasive Devices       Peripheral Intravenous Line  Duration             Peripheral IV 10/24/23 Right Antecubital <1 day                    Imaging: No pertinent imaging reviewed.     Recent Cultures (last 7 days):         Last 24 Hours Medication List:   Current Facility-Administered Medications   Medication Dose Route Frequency Provider Last Rate    acetaminophen  650 mg Oral Q6H PRN Monae Craig MD      aluminum-magnesium hydroxide-simethicone  30 mL Oral Q6H PRN Monae Craig MD      calcitriol  0.25 mcg Oral Daily Monae Craig MD      calcium carbonate-vitamin D  1 tablet Oral TID With Randy Fontana MD      enoxaparin  40 mg Subcutaneous Daily Monae Craig MD      haloperidol lactate  2 mg Intramuscular Once Philip Carcamo PA-C      levothyroxine  75 mcg Oral Daily Monae Craig MD      magnesium hydroxide  30 mL Oral Daily PRN Monae Craig MD      magnesium Oxide  400 mg Oral BID Monae Craig MD      magnesium sulfate  4 g Intravenous Once Monae Craig MD      multi-electrolyte 75 mL/hr Intravenous Continuous Licha Meyers MD 75 mL/hr (10/25/23 0007)    multivitamin-minerals  1 tablet Oral Daily Licha Meyers MD      OLANZapine  5 mg Oral TID PRN Licha Meyers MD      ondansetron  4 mg Intravenous Q6H PRN Licha Meyers MD          Today, Patient Was Seen By: JERMAINE Ndiaye    **Please Note: This note may have been constructed using a voice recognition system. **

## 2023-10-25 NOTE — UTILIZATION REVIEW
Initial Clinical Review    Admission: Date/Time/Statement:   Admission Orders (From admission, onward)       Ordered        10/24/23 1541  INPATIENT ADMISSION  Once                          Orders Placed This Encounter   Procedures    INPATIENT ADMISSION     Standing Status:   Standing     Number of Occurrences:   1     Order Specific Question:   Level of Care     Answer:   Med Surg [16]     Order Specific Question:   Estimated length of stay     Answer:   More than 2 Midnights     Order Specific Question:   Certification     Answer:   I certify that inpatient services are medically necessary for this patient for a duration of greater than two midnights. See H&P and MD Progress Notes for additional information about the patient's course of treatment. ED Arrival Information       Expected   -    Arrival   10/24/2023 12:27    Acuity   Emergent              Means of arrival   Walk-In    Escorted by   Family Member    Service   Hospitalist    Admission type   Emergency              Arrival complaint   Altered Mental Status Post Op             Chief Complaint   Patient presents with    Altered Mental Status     Per  pt has been exp change in mental status for the past 48 hours or so. Pt had thyroidectomy on 10/11. Pt appears confused and disoriented to situation intermittently during triage. Initial Presentation: 56 y.o. female to the ED from home with complaints of change in mental status, confusion, s/p thyroidectomy 10/11. Admitted to inpatient for acute metabolic encephalopathy, hypomagnesemia. Arrives with GCS 15, tachycardic. Magnesium 1.6. Given IV supplement, recheck. Calcium 9.2. Ionized calcium 1.11, serum calcium WNL. As per discussion with endocrinology,  Give supplemental calcium. CT head shows no acute findings, rounded hypodense lesion in temporal lobe. Has ketones in her urine of unclear significance.      Date:  10/25   Day 2:  VBG completed on admission, noted to have partially compensated respiratory alkalosis noted with pH 7.420, CO2 33, HCO 20.9 . Continue with Neuro checks. GCs currently 15. Having rapid, pressures, speech and is hyperactive. Endocrinology: No clear etiology for acute change in mental status. Consider repeat 24 hour urine testing. Optimize thyroid replacement. Baseline calcitonin was 02937 suggestive of metastatic disease. Recommend psychatric consult. Behavioral consult: Unspecified mood disorder; rule out mood disorder secondary to other physiological condition (in setting of hypothyroidism status post thyroidectomy); acute encephalopathy/delirium on admission in setting of hypothyroidism . Consider lamotrigine twice a day. COntinue zyprexa as needed. 10/25 UPdate: increasingly agitated, requesting iv come out. Even with IV out, difficulty re-directing, given dose of IM haldol.    ED Triage Vitals [10/24/23 1244]   Temperature Pulse Respirations Blood Pressure SpO2   97.6 °F (36.4 °C) (!) 128 20 141/66 98 %      Temp Source Heart Rate Source Patient Position - Orthostatic VS BP Location FiO2 (%)   Oral Monitor Sitting Right arm --      Pain Score       No Pain          Wt Readings from Last 1 Encounters:   10/24/23 47.9 kg (105 lb 9.6 oz)     Additional Vital Signs:   e/Time Temp Pulse Resp BP MAP (mmHg) SpO2 O2 Device Patient Position - Orthostatic VS   10/25/23 08:06:17 97.9 °F (36.6 °C) 90 -- 132/77 95 96 % None (Room air) Lying   10/24/23 2351 -- -- -- -- -- 97 % -- --   10/24/23 2300 98.2 °F (36.8 °C) 93 15 123/73 90 97 % None (Room air) Lying   10/24/23 1957 -- -- -- -- -- 97 % None (Room air) --   10/24/23 17:43:17 97.9 °F (36.6 °C) 104 -- 128/75 93 98 % -- --   10/24/23 1530 -- 110 Abnormal  13 133/67 -- 97 % None (Room air) Lying   10/24/23 1430 -- 115 Abnormal  13 139/72 99 97 % None (Room air) --   10/24/23 1330 -- 105 18 125/68 91 98 % -- --   10/24/23 1315 -- 99 15 123/59 85 98 % -- --   10/24/23 1244 97.6 °F (36.4 °C) 128 Abnormal  20 141/66 -- 98 % None (Room air) Sittin     Pertinent Labs/Diagnostic Test Results:   1/24 EKG: Interpretation:    Interpretation: non-specific    Rate:    ECG rate:  107    ECG rate assessment: tachycardic    Rhythm:    Rhythm: sinus tachycardia    Ectopy:    Ectopy: none    QRS:    QRS axis:  Right    QRS intervals:  Normal  Conduction:    Conduction: normal    ST segments:    ST segments:  Normal  T waves:    T waves: normal     CT head without contrast   Final Result by Simran Fabian MD (10/24 1448)         1. No acute intracranial hemorrhage or mass effect. No hydrocephalus. 2. Rounded 1 cm hypodense lesion in the right temporal lobe may represent a choroidal fissure cyst or prominent perivascular space. Other lesions not excluded. Follow-up contrast-enhanced MRI of the brain suggested for further characterization.                   Workstation performed: [id number]               Results from last 7 days   Lab Units 10/25/23  0839 10/24/23  1300   WBC Thousand/uL 8.13 10.21*   HEMOGLOBIN g/dL 12.5 14.0   HEMATOCRIT % 37.6 40.2   PLATELETS Thousands/uL 407* 464*   NEUTROS ABS Thousands/µL 5.58 8.05*         Results from last 7 days   Lab Units 10/25/23  0839 10/24/23  1300 10/23/23  0705   SODIUM mmol/L 139 135 137   POTASSIUM mmol/L 3.8 3.5 4.5   CHLORIDE mmol/L 106 102 102   CO2 mmol/L 26 23 28   ANION GAP mmol/L 7 10 7   BUN mg/dL 10 12 12   CREATININE mg/dL 0.59* 0.71 0.59*   EGFR ml/min/1.73sq m 110 102 110   CALCIUM mg/dL 8.8 9.2 8.8   CALCIUM, IONIZED mmol/L  --  1.11*  --    MAGNESIUM mg/dL 2.1 1.6*  --    PHOSPHORUS mg/dL 3.1  --   --      Results from last 7 days   Lab Units 10/25/23  0839 10/24/23  1300 10/23/23  0705   AST U/L 16 16 17   ALT U/L 13 14 15   ALK PHOS U/L 46 48 54   TOTAL PROTEIN g/dL 7.2 7.0 7.5   ALBUMIN g/dL 4.1 4.1 4.4   TOTAL BILIRUBIN mg/dL 0.48 0.39 0.53   BILIRUBIN DIRECT mg/dL  --  0.08  --      Results from last 7 days   Lab Units 10/24/23  1301   POC GLUCOSE mg/dl 124 Results from last 7 days   Lab Units 10/25/23  0839 10/24/23  1300   GLUCOSE RANDOM mg/dL 102 123     Results from last 7 days   Lab Units 10/24/23  1300   PH MARIBEL  7.420*   PCO2 MARIBEL mm Hg 33.0*   PO2 MARIBEL mm Hg 63.0*   HCO3 MARIBEL mmol/L 20.9*   BASE EXC MARIBEL mmol/L -2.7   O2 CONTENT MARIBEL ml/dL 18.2   O2 HGB, VENOUS % 91.4*             Results from last 7 days   Lab Units 10/24/23  1300   HS TNI 0HR ng/L 4     Results from last 7 days   Lab Units 10/24/23  1300 10/23/23  0705   TSH 3RD GENERATON uIU/mL 10.665* 8.626*       Results from last 7 days   Lab Units 10/24/23  1308   CLARITY UA  Clear   COLOR UA  Light Yellow   SPEC GRAV UA  1.015   PH UA  6.5   GLUCOSE UA mg/dl Negative   KETONES UA mg/dl 10 (1+)*   BLOOD UA  Negative   PROTEIN UA mg/dl Trace*   NITRITE UA  Negative   BILIRUBIN UA  Negative   UROBILINOGEN UA (BE) mg/dl <2.0   LEUKOCYTES UA  Negative   WBC UA /hpf 1-2   RBC UA /hpf 1-2   BACTERIA UA /hpf Occasional   EPITHELIAL CELLS WET PREP /hpf Occasional   MUCUS THREADS  Occasional*     Results from last 7 days   Lab Units 10/24/23  1308   AMPH/METH  Negative   BARBITURATE UR  Negative   BENZODIAZEPINE UR  Negative   COCAINE UR  Negative   METHADONE URINE  Negative   OPIATE UR  Negative   PCP UR  Negative   THC UR  Negative     Results from last 7 days   Lab Units 10/24/23  1300   ETHANOL LVL mg/dL <10   ACETAMINOPHEN LVL ug/mL <86*   SALICYLATE LVL mg/dL <5       ED Treatment:   Medication Administration from 10/24/2023 1227 to 10/24/2023 1740         Date/Time Order Dose Route Action Comments     10/24/2023 1427 EDT ziprasidone (GEODON) IM injection 20 mg 20 mg Intramuscular Given --     10/24/2023 1428 EDT sterile water injection **ADS Override Pull** 10 mL  Given --     10/24/2023 1528 EDT magnesium Oxide (MAG-OX) tablet 800 mg 800 mg Oral Given --              Admitting Diagnosis: Sinus tachycardia [R00.0]  Hypomagnesemia [E83.42]  Delirium [R41.0]  Medullary thyroid carcinoma ([address]) [C73]  H/O thyroidectomy [E89.0]  AMS (altered mental status) [R41.82]  Age/Sex: 56 y.o. female  Admission Orders:  UP and OOB    Scheduled Medications:  calcitriol, 0.25 mcg, Oral, Daily  calcium carbonate-vitamin D, 1 tablet, Oral, TID With Meals  enoxaparin, 40 mg, Subcutaneous, Daily  haloperidol lactate, 2 mg, Intramuscular, Once  levothyroxine, 75 mcg, Oral, Daily  magnesium Oxide, 400 mg, Oral, BID  magnesium sulfate, 4 g, Intravenous, Once  multivitamin-minerals, 1 tablet, Oral, Daily      Continuous IV Infusions:  multi-electrolyte, 75 mL/hr, Intravenous, Continuous      PRN Meds:  acetaminophen, 650 mg, Oral, Q6H PRN  aluminum-magnesium hydroxide-simethicone, 30 mL, Oral, Q6H PRN  magnesium hydroxide, 30 mL, Oral, Daily PRN  OLANZapine, 5 mg, Oral, TID PRN  ondansetron, 4 mg, Intravenous, Q6H PRN        IP CONSULT TO ENDOCRINOLOGY    Network Utilization Review Department  ATTENTION: Please call with any questions or concerns to [phone] and carefully listen to the prompts so that you are directed to the right person. All voicemails are confidential.   For Discharge needs, contact Care Management DC Support Team at [phone] opt. [phone]  Send all requests for admission clinical reviews, approved or denied determinations and any other requests to dedicated fax number below belonging to the campus where the patient is receiving treatment.  List of dedicated fax numbers for the Facilities:  [place] DENIALS (Administrative/Medical Necessity) [phone]   DISCHARGE SUPPORT TEAM (NETWORK) [phone]   [address] [place] [address] (Maternity/NICU/Pediatrics) [phone] [address] [facility] [phone]   [facility] [place] [address] [address] [facility] [phone]

## 2023-10-25 NOTE — TELEMEDICINE
TeleConsultation - [facility] 56 y.o. female MRN: [id number]  Unit/Bed#: -01 Encounter: [id number]        REQUIRED DOCUMENTATION:     1. This service was provided via Telemedicine. 2. Provider located at [facility].  3. TeleMed provider: Chiquita Lott MD.  4. Identify all parties in room with patient during tele consult:  pt  5. Patient was then informed that this was a Telemedicine visit and that the exam was being conducted confidentially over secure lines. My office door was closed. No one else was in the room. Patient acknowledged consent and understanding of privacy and security of the Telemedicine visit, and gave us permission to have the assistant stay in the room in order to assist with the history and to conduct the exam.  I informed the patient that I have reviewed their record in Epic and presented the opportunity for them to ask any questions regarding the visit today. The patient agreed to participate. Assessment/Plan     Present on Admission:   Medullary thyroid carcinoma (HCC)   Hypothyroidism   Acute metabolic encephalopathy   Hypomagnesemia    Assessment:    Unspecified mood disorder; rule out mood disorder secondary to other physiological condition (in setting of hypothyroidism status post thyroidectomy); acute encephalopathy/delirium on admission in setting of hypothyroidism    Treatment Plan:    Consider lamotrigine 25 mg p.o. twice daily as mood stabilizer and antidepressant. The patient prefers to further discuss this with her attending physician before giving consent for this medication. May continue Zyprexa as needed as currently ordered. Upon discharge recommend follow-up with outpatient psychiatry for further assessment and medication management. A psychotherapy/counseling component would also be helpful to assist the patient in optimizing her coping skills at this time. No suicide precautions are indicated. Reconsult psychiatry as needed.     Current Medications:     Current Facility-Administered Medications   Medication Dose Route Frequency Provider Last Rate    acetaminophen  650 mg Oral Q6H PRN Donita Wright MD      aluminum-magnesium hydroxide-simethicone  30 mL Oral Q6H PRN Donita Wright MD      calcitriol  0.25 mcg Oral Daily Donita Wright MD      calcium carbonate-vitamin D  1 tablet Oral TID With Meals Donita Wright MD      enoxaparin  40 mg Subcutaneous Daily Donita Wright MD      haloperidol lactate  2 mg Intramuscular Once David Piedra PA-C      [START ON 10/26/2023] levothyroxine  88 mcg Oral Early Morning MD Julia      magnesium hydroxide  30 mL Oral Daily PRN Donita Wright MD      magnesium Oxide  400 mg Oral BID Donita Wright MD      magnesium sulfate  4 g Intravenous Once Donita Wright MD      multi-electrolyte  75 mL/hr Intravenous Continuous Donita Wright MD 75 mL/hr (10/25/23 0007)    multivitamin-minerals  1 tablet Oral Daily Donita Wright MD      OLANZapine  5 mg Oral TID PRN Donita Wright MD      ondansetron  4 mg Intravenous Q6H PRN Donita Wright MD         Risks / Benefits of Treatment:    Risks, benefits, and possible side effects of medications explained to patient and patient verbalizes understanding.       Other treatment modalities recommended as indicated:    outpatient referral      Inpatient consult to Psychiatry  Consult performed by: Derik Gonzalez MD  Consult ordered by: JERMAINE Tarango        Physician Requesting Consult: Yancy Diaz DO  Principal Problem:Acute metabolic encephalopathy    Reason for Consult:  Unusual behavior; delirium      History of Present Illness      Patient is a 56 y.o. female who has presented to the hospital where the following is documented in the H&P:  Donel Leader is a 56 y.o. female who presents with      Review of Systems:  Negative except as above  History obtained from the patient  General ROS: positive for  - fatigue  Psychological ROS: positive for - disorientation  Ophthalmic ROS: negative  Respiratory ROS: no cough, shortness of breath, or wheezing  Cardiovascular ROS: no chest pain or dyspnea on exertion  Gastrointestinal ROS: no abdominal pain, change in bowel habits, or black or bloody stools  Genito-Urinary ROS: no dysuria, trouble voiding, or hematuria  Musculoskeletal ROS: positive for - muscular weakness  Neurological ROS: no TIA or stroke symptoms  Hematological ROS- No active bleeding  Otherwise, all other 12 point review of systems normal.           Past Medical and Surgical History:   Medical History   History reviewed. No pertinent past medical history. Surgical History         Past Surgical History:   Procedure Laterality Date    OH THYROIDECTOMY TOTAL/SUBTOTAL LMTD NECK DISSECT N/A 10/11/2023     Procedure: TOTAL THYROIDECTOMY, BILATERAL CENTRAL COMPARTMENT NECK DISSECTION WITH NIMS;  Surgeon: Amparo Gottron, MD;  Location: [facility];  Service: ENT    US GUIDED THYROID BIOPSY   08/18/2023    WISDOM TOOTH EXTRACTION             Meds/Allergies: Allergies: No Known Allergies  Prior to Admission Medications   Prescriptions Last Dose Informant Patient Reported? Taking?    Biotin 10 MG TABS   Self Yes No   Sig: Take by mouth   Calcium Carb-Cholecalciferol (calcium carbonate-vitamin D) 500 mg-5 mcg tablet Not Taking   Yes No   Sig: PLEASE SEE ATTACHED FOR DETAILED DIRECTIONS   Patient not taking: Reported on 10/24/2023   Multiple Vitamin (MULTIVITAMIN ADULT PO) Not Taking Self Yes No   Sig: Take 1 tablet by mouth daily   Patient not taking: Reported on 10/24/2023   calcitriol (ROCALTROL) 0.25 mcg capsule Not Taking   No No   Sig: Take 1 capsule (0.25 mcg total) by mouth if needed (Please contact the office if you experience any numbness or tingling of the fingers, toes, and mouth and we will tell you to take this medication.) for up to 21 days   Patient not taking: Reported on 10/24/2023   calcium carbonate-vitamin D 500 mg-5 mcg per tablet   Spouse/Significant Other No No   Sig: Take 2 tablets by mouth 3 (three) times a day with meals for 7 days, THEN 2 tablets 2 (two) times a day with meals for 7 days, THEN 2 tablets daily with breakfast for 7 days. Patient taking differently: Take 1 tablets by mouth 2 (three) times a day with meals for 7 days, THEN 2 tablets daily with breakfast for 7 days.    levothyroxine (Synthroid) 75 mcg tablet     No No   Sig: Take 1 tablet (75 mcg total) by mouth daily   magnesium Oxide (MAG-OX) 400 mg TABS Not Taking   No No   Sig: Take 1 tablet (400 mg total) by mouth 2 (two) times a day for 21 days   Patient not taking: Reported on 10/24/2023   magnesium oxide (MAG-OX) 400 mg tablet Not Taking   Yes No   Sig: TAKE 1 TABLET (400 MG TOTAL) BY MOUTH 2 (TWO) TIMES A DAY FOR 21 DAYS   Patient not taking: Reported on 10/24/2023      Facility-Administered Medications Last Administration Doses Remaining   EPINEPHrine (EPIPEN) injection 0.3 mg None recorded 2         Social History:     Social History   []Expand by Jump[employer]            Socioeconomic History    Marital status: /Civil Union       Spouse name: Not on file    Number of children: Not on file    Years of education: Not on file    Highest education level: Not on file   Occupational History    Not on file   Tobacco Use    Smoking status: Never    Smokeless tobacco: Never   Vaping Use    Vaping Use: Never used   Substance and Sexual Activity    Alcohol use: Never    Drug use: Never    Sexual activity: Not Currently       Partners: Male       Birth control/protection: Condom Male   Other Topics Concern    Not on file   Social History Narrative    Not on file      Social Determinants of Health      Financial Resource Strain: Not on file   Food Insecurity: Not on file   Transportation Needs: Not on file   Physical Activity: Not on file   Stress: Not on file   Social Connections: Not on file   Intimate Partner Violence: Not on file   Housing Stability: Not on file         Patient Pre-hospital Living Situation: Lives at home  Patient Pre-hospital Level of Mobility: Independent and mobile  Patient Pre-hospital Diet Restrictions: None     Family History:  Family History         Family History   Problem Relation Age of Onset    No Known Problems Mother      No Known Problems Father      Breast cancer Paternal Aunt 46         Prior to meeting with the patient I met with nursing and  who both report the patient has been exhibiting severe mood lability within seconds away from extremely depressed severity happy or angry. She also has had difficulty remaining focused being very forgetful and easily distractible.  states this is new behavior for the patient since her thyroidectomy. She has no prior psychiatric history per . In meeting with the patient her chief complaint is the severe mood lability since her thyroidectomy which she attributes to "hormonal changes". Past psychiatric history: Unremarkable    Social history: Patient is . They have a son. Patient is not employed at this time. She reports no abuse. Family history: Unremarkable    Substance use history: Unremarkable    [place] suicide severity risk scale: The patient reports no history of suicidal ideation or death wishes. She is shows no risk for suicide. Mental status examination: The patient is alert and well oriented in all spheres. She appears slightly hypomanic at this time with speech slightly pressured. She was easily distractible but easily redirected. She made good eye contact and was cooperative. Thought process was logical and linear. Thought content was reality based. Associations were tight. Memory appeared to be grossly intact at this time. She appears to be of average intelligence by use vocabulary, general fund of knowledge, semistructured syntax.   She denies suicidal homicidal ideation. She denies hallucinations and psychotic features. Insight and judgment are fair at this time. History reviewed. No pertinent past medical history. Medical Review Of Systems:    Review of Systems    Meds/Allergies     all current active meds have been reviewed  No Known Allergies    Objective     Vital signs in last 24 hours:  Temp:  [97.9 °F (36.6 °C)-98.2 °F (36.8 °C)] 97.9 °F (36.6 °C)  HR:  [] 90  Resp:  [15] 15  BP: (123-132)/(73-77) 132/77      Intake/Output Summary (Last 24 hours) at 10/25/2023 1606  Last data filed at 10/25/2023 0007  Gross per 24 hour   Intake 450 ml   Output --   Net 450 ml         Lab Results: I have personally reviewed all pertinent laboratory/tests results. Imaging Studies: CT head without contrast    Result Date: 10/24/2023  Narrative: CT BRAIN - WITHOUT CONTRAST INDICATION:   Delirium AMS. COMPARISON:  None. TECHNIQUE:  CT examination of the brain was performed. Multiplanar 2D reformatted images were created from the source data. Radiation dose length product (DLP) for this visit:  778 mGy-cm . This examination, like all CT scans performed in the [facility], was performed utilizing techniques to minimize radiation dose exposure, including the use of iterative reconstruction and automated exposure control. IMAGE QUALITY:  Diagnostic. FINDINGS: PARENCHYMA: Rounded 1 cm hypodense lesion in the medial aspect of the right temporal lobe, series 2 image 14, perhaps a choroidal fissure cyst or prominent perivascular space, incompletely characterized. No mass effect. No acute intracranial hemorrhage. VENTRICLES AND EXTRA-AXIAL SPACES:  Normal for the patient's age. VISUALIZED ORBITS: Normal visualized orbits. PARANASAL SINUSES: Normal visualized paranasal sinuses. CALVARIUM AND EXTRACRANIAL SOFT TISSUES:  Normal.     Impression: 1. No acute intracranial hemorrhage or mass effect. No hydrocephalus.  2. Rounded 1 cm hypodense lesion in the right temporal lobe may represent a choroidal fissure cyst or prominent perivascular space. Other lesions not excluded. Follow-up contrast-enhanced MRI of the brain suggested for further characterization. Workstation performed: [id number]     EKG/Pathology/Other Studies: No results found for: "VENTRATE", "Dayanara Richard", "PRINT", "QRSDINT", "QTINT", "QTCINT", "PAXIS", "QRSAXIS", "TWAVEAXIS"     Code Status: Level 1 - Full Code  Advance Directive and Living Will:      Power of :    POLST:      Screenings:    1. Nutrition Screening  Not available on chart    2. Pain Screening  Not available on chart    3. Suicide Screening  Not available on chart    Counseling / Coordination of Care: Total floor / unit time spent today 30 minutes. Greater than 50% of total time was spent with the patient and / or family counseling and / or coordination of care. A description of the counseling / coordination of care: Chart review, patient evaluation, coordination communication with staff, nursing and provider.

## 2023-10-25 NOTE — NURSING NOTE
Patient positive for anxiety; agitation; irritability; tremors. Refused AM labs; PRN anxiolytics. Tolerated administration of PO thyroid medication. Requested to be reassessed in 1 hour. ---------------------------------------------------------------------------  One hour later, patient resting, w/family at bedside. Affect; mood improved. SLIM made aware.

## 2023-10-26 LAB
ANION GAP SERPL CALCULATED.3IONS-SCNC: 9 MMOL/L
BUN SERPL-MCNC: 11 MG/DL (ref 5–25)
CA-I BLD-SCNC: 1.09 MMOL/L (ref 1.12–1.32)
CALCIUM SERPL-MCNC: 8.8 MG/DL (ref 8.4–10.2)
CHLORIDE SERPL-SCNC: 104 MMOL/L (ref 96–108)
CO2 SERPL-SCNC: 23 MMOL/L (ref 21–32)
CREAT SERPL-MCNC: 0.61 MG/DL (ref 0.6–1.3)
FOLATE SERPL-MCNC: 20 NG/ML
GFR SERPL CREATININE-BSD FRML MDRD: 109 ML/MIN/1.73SQ M
GLUCOSE SERPL-MCNC: 143 MG/DL (ref 65–140)
MAGNESIUM SERPL-MCNC: 2 MG/DL (ref 1.9–2.7)
PHOSPHATE SERPL-MCNC: 3 MG/DL (ref 2.7–4.5)
POTASSIUM SERPL-SCNC: 3.8 MMOL/L (ref 3.5–5.3)
SODIUM SERPL-SCNC: 136 MMOL/L (ref 135–147)
T3FREE SERPL-MCNC: 3.21 PG/ML (ref 2.5–3.9)
TREPONEMA PALLIDUM IGG+IGM AB [PRESENCE] IN SERUM OR PLASMA BY IMMUNOASSAY: NORMAL
VIT B12 SERPL-MCNC: 419 PG/ML (ref 180–914)

## 2023-10-26 PROCEDURE — 84425 ASSAY OF VITAMIN B-1: CPT | Performed by: NURSE PRACTITIONER

## 2023-10-26 PROCEDURE — 84100 ASSAY OF PHOSPHORUS: CPT | Performed by: NURSE PRACTITIONER

## 2023-10-26 PROCEDURE — 86255 FLUORESCENT ANTIBODY SCREEN: CPT | Performed by: NURSE PRACTITIONER

## 2023-10-26 PROCEDURE — 82746 ASSAY OF FOLIC ACID SERUM: CPT | Performed by: NURSE PRACTITIONER

## 2023-10-26 PROCEDURE — 82607 VITAMIN B-12: CPT | Performed by: NURSE PRACTITIONER

## 2023-10-26 PROCEDURE — 80048 BASIC METABOLIC PNL TOTAL CA: CPT | Performed by: NURSE PRACTITIONER

## 2023-10-26 PROCEDURE — 99232 SBSQ HOSP IP/OBS MODERATE 35: CPT | Performed by: STUDENT IN AN ORGANIZED HEALTH CARE EDUCATION/TRAINING PROGRAM

## 2023-10-26 PROCEDURE — 86800 THYROGLOBULIN ANTIBODY: CPT | Performed by: NURSE PRACTITIONER

## 2023-10-26 PROCEDURE — 99232 SBSQ HOSP IP/OBS MODERATE 35: CPT | Performed by: NURSE PRACTITIONER

## 2023-10-26 PROCEDURE — 82330 ASSAY OF CALCIUM: CPT | Performed by: NURSE PRACTITIONER

## 2023-10-26 PROCEDURE — 86341 ISLET CELL ANTIBODY: CPT

## 2023-10-26 PROCEDURE — 86780 TREPONEMA PALLIDUM: CPT | Performed by: NURSE PRACTITIONER

## 2023-10-26 PROCEDURE — 83735 ASSAY OF MAGNESIUM: CPT | Performed by: NURSE PRACTITIONER

## 2023-10-26 PROCEDURE — 84481 FREE ASSAY (FT-3): CPT | Performed by: NURSE PRACTITIONER

## 2023-10-26 PROCEDURE — 87529 HSV DNA AMP PROBE: CPT | Performed by: NURSE PRACTITIONER

## 2023-10-26 PROCEDURE — 86376 MICROSOMAL ANTIBODY EACH: CPT | Performed by: NURSE PRACTITIONER

## 2023-10-26 RX ORDER — HALOPERIDOL 5 MG/ML
5 INJECTION INTRAMUSCULAR ONCE
Status: COMPLETED | OUTPATIENT
Start: 2023-10-26 | End: 2023-10-26

## 2023-10-26 RX ORDER — HALOPERIDOL 5 MG/ML
5 INJECTION INTRAMUSCULAR
Status: DISCONTINUED | OUTPATIENT
Start: 2023-10-26 | End: 2023-10-28 | Stop reason: HOSPADM

## 2023-10-26 RX ORDER — LORAZEPAM 2 MG/ML
2 INJECTION INTRAMUSCULAR
Status: DISCONTINUED | OUTPATIENT
Start: 2023-10-26 | End: 2023-10-28 | Stop reason: HOSPADM

## 2023-10-26 RX ORDER — QUETIAPINE FUMARATE 25 MG/1
50 TABLET, FILM COATED ORAL
Status: DISCONTINUED | OUTPATIENT
Start: 2023-10-26 | End: 2023-10-28 | Stop reason: HOSPADM

## 2023-10-26 RX ADMIN — VALPROATE SODIUM 250 MG: 100 INJECTION, SOLUTION INTRAVENOUS at 15:55

## 2023-10-26 RX ADMIN — LEVOTHYROXINE SODIUM 88 MCG: 88 TABLET ORAL at 05:47

## 2023-10-26 RX ADMIN — QUETIAPINE FUMARATE 50 MG: 25 TABLET ORAL at 21:12

## 2023-10-26 RX ADMIN — HALOPERIDOL LACTATE 5 MG: 5 INJECTION, SOLUTION INTRAMUSCULAR at 09:23

## 2023-10-26 RX ADMIN — Medication 400 MG: at 17:55

## 2023-10-26 RX ADMIN — Medication 1 TABLET: at 17:55

## 2023-10-26 RX ADMIN — HALOPERIDOL LACTATE 5 MG: 5 INJECTION, SOLUTION INTRAMUSCULAR at 15:04

## 2023-10-26 NOTE — ASSESSMENT & PLAN NOTE
Patient is s/p total thyroidectomy for medullary cancer of the thyroid on 10/11  Endocrinology consulted  Recommend increasing Synthroid to 88 mcg  Psych consultation  Suggest MRI imaging to rule out metastasis however patient is unable to cooperate at this current time we will continue to reassess and complete MRI when able  will consider repeating a 24-hour urine collection to include 5-HIAA, catecholamines, metanephrines, dopamine, VMA etc.  R/o hypercortisolism but could also be psuedocushing's.

## 2023-10-26 NOTE — PROGRESS NOTES
New 1:1 sitter -   Patient restless. Pt very quickly continues getting out of bed to dance around room and sing and then getting back to bed over and over again. Pt does not remain in bed for more than 5-10 seconds at a time before this occurs again. Pt has removed her clothing multiple times and has times where she walks up to the wall or her mother and stairs before continuing this pattern. Patient is not having any conversation at this time.     ER PCA [id number] 10/26/2023

## 2023-10-26 NOTE — CONSULTS
Consultation - Neurology   Cody Rojas 56 y.o. female MRN: [id number]  Unit/Bed#: -01 Encounter: [id number]      Assessment/Plan   * Acute metabolic encephalopathy  Assessment & Plan  56 y.o.  female with medullary thyroid carcinoma s/p recent total thyroidectomy with lymph node dissection (10/11/2023) who presents to [facility] ED on 10/24/2023 with AMS x 2 days. Please refer to HPI for additional details    Work-up:  -Outpatient lab work prior to admission (10/23): Elevated TSH 8.66, free T4 WNL, PTH WNL  -CT head (10/24): Unremarkable for acute intracranial abnormalities  1 cm rounded hypodensity noted in right temporal lobe concerning for choroidal fissure cyst  -Labs on presentation:  Mild leukocytosis, 10.21. Afebrile  Thrombocytosis, 464  Magnesium low, 1.69 ionized calcium low, 1.11  TSH elevated, 10.665  UA: Negative nitrite, 1-2 WBC, occasional bacteria  Labs WNL: Hepatic function panel, troponin, ethanol level, acetaminophen level, salicylate level, RDU    Acute encephalopathy, unclear etiology; suspected delirium in the setting of sleep deprivation, seizures less likely based on exam findings    Plan:  - Routine EEG pending  - Consider MRI brain w wo to rule out metastasis   - Lab work ordered per primary team include:   5 HIAA urine, 17-ketosteroids urine, cadmium urine, calcium urine, catecholamines, citrate urine, metanephrines, kappa/lambda light chains, cortisol for urine, thyroid antibodies panel  Additional labs ordered: syphilis, hsv, ens-2, thiamine, folate, b12, ical  - Seizure precautions  - Conservative management of delirium: minimize noise, maintain day/night cycle, provide frequent redirection, avoid restraints if possible, etc.   - Medical management and supportive care per primary team, notify with changes  - defer antipsychotics/mood stabilizing agents to psychiatry/primary service.  Monitor Qtc  - synthroid per endocrinology        Please refer to attending attestation for additional recommendations    Recommendations for outpatient neurological follow up have yet to be determined. Reason for Consult / Principal Problem: Delirium, acute metabolic encephalopathy  Hx and PE limited by: AMS  HPI: Bren Butler is a 56 y.o.  female with medullary thyroid carcinoma s/p recent total thyroidectomy with lymph node dissection (10/11/2023) who presents to [place] ED on 10/24/2023 with AMS x 2 days. History obtained per chart review. Patient was brought into the ED by her  who reported altered mental status for the past 48 hours. Of note, patient had a thyroidectomy on 10/11/2023. Since her surgery, patient states that she has been doing well and tolerating her medications. Family reports 2 to 3 days prior to presentation, patient has been having intermittent episodes of confusion and agitation. Patient's  also reports poor sleep, patient reportedly only sleeping approximately 10 minutes at a time of the past 2 days. Family also describes episodes of hyperactivity, paranoid ideation, and memory impairment. Patient had outpatient lab work completed on 10/23/2023 which revealed elevated TSH, normal T4, normal PTH. Patient initially presented to [facility] ED on 10/24/2023 with AMS, BP on presentation 141/66 with an elevated HR of 128. CT head on presentation unremarkable for acute intracranial abnormalities. Patient was found to have mild leukocytosis, thrombocytosis, low magnesium, and elevated TSH. Patient was evaluated by behavioral health via telemedicine on 10/25 due to concern for unspecified mood disorder secondary to other physiologic conditions. Neurology recommended initiating lamotrigine 25 mg BID as well as continuing Zyprexa as needed. Last night 10/25, hospitalist provider was called to bedside for agitation and combativeness. At the time of hospitalist evaluation, patient had already received Zyprexa.   Patient then received IM Haldol 2 mg for the increasing agitation. Neurology was consulted on 10/26/2023 for persistent confusion. Inpatient consult to Neurology  Consult performed by: Panda Rutherford PA-C  Consult ordered by: JERMAINE Diaz        Review of Systems   Unable to perform ROS: Mental status change     Historical Information   History reviewed. No pertinent past medical history. Past Surgical History:   Procedure Laterality Date    [place] THYROIDECTOMY TOTAL/SUBTOTAL LMTD NECK DISSECT N/A 10/11/2023    Procedure: TOTAL THYROIDECTOMY, BILATERAL CENTRAL COMPARTMENT NECK DISSECTION WITH NIMS;  Surgeon: Samantha Castillo MD;  Location: [facility];  Service: ENT    US GUIDED THYROID BIOPSY  08/18/2023    WISDOM TOOTH EXTRACTION       Social History   Social History     Substance and Sexual Activity   Alcohol Use Never     Social History     Substance and Sexual Activity   Drug Use Never     E-Cigarette/Vaping    E-Cigarette Use Never User      E-Cigarette/Vaping Substances    Nicotine No     THC No     CBD No     Flavoring No     Other No     Unknown No      Social History     Tobacco Use   Smoking Status Never   Smokeless Tobacco Never     Family History:   Family History   Problem Relation Age of Onset    No Known Problems Mother     No Known Problems Father     Breast cancer Paternal Aunt 46       Review of previous medical records was completed.     Meds/Allergies   all current active meds have been reviewed, current meds:   Current Facility-Administered Medications   Medication Dose Route Frequency    acetaminophen (TYLENOL) tablet 650 mg  650 mg Oral Q6H PRN    aluminum-magnesium hydroxide-simethicone (MAALOX) oral suspension 30 mL  30 mL Oral Q6H PRN    calcitriol (ROCALTROL) capsule 0.25 mcg  0.25 mcg Oral Daily    calcium carbonate-vitamin D 500 mg-5 mcg tablet 1 tablet  1 tablet Oral TID With Meals    enoxaparin (LOVENOX) subcutaneous injection 40 mg  40 mg Subcutaneous Daily    haloperidol lactate (HALDOL) injection 5 mg  5 mg Intramuscular HS PRN    levothyroxine tablet 88 mcg  88 mcg Oral Early Morning    LORazepam (ATIVAN) injection 2 mg  2 mg Intravenous HS PRN    magnesium hydroxide (MILK OF MAGNESIA) oral suspension 30 mL  30 mL Oral Daily PRN    magnesium Oxide (MAG-OX) tablet 400 mg  400 mg Oral BID    magnesium sulfate 4 g/100 mL IVPB (premix) 4 g  4 g Intravenous Once    multivitamin-minerals (CENTRUM) tablet 1 tablet  1 tablet Oral Daily    OLANZapine (ZyPREXA ZYDIS) dispersible tablet 5 mg  5 mg Oral TID PRN    ondansetron (ZOFRAN) injection 4 mg  4 mg Intravenous Q6H PRN    QUEtiapine (SEROquel) tablet 50 mg  50 mg Oral HS    valproate (DEPACON) 250 mg in sodium chloride 0.9 % 50 mL IVPB  250 mg Intravenous BID   , and PTA meds:   Prior to Admission Medications   Prescriptions Last Dose Informant Patient Reported? Taking? Biotin 10 MG TABS  Self Yes No   Sig: Take by mouth   Calcium Carb-Cholecalciferol (calcium carbonate-vitamin D) 500 mg-5 mcg tablet Not Taking  Yes No   Sig: PLEASE SEE ATTACHED FOR DETAILED DIRECTIONS   Patient not taking: Reported on 10/24/2023   Multiple Vitamin (MULTIVITAMIN ADULT PO) Not Taking Self Yes No   Sig: Take 1 tablet by mouth daily   Patient not taking: Reported on 10/24/2023   calcitriol (ROCALTROL) 0.25 mcg capsule Not Taking  No No   Sig: Take 1 capsule (0.25 mcg total) by mouth if needed (Please contact the office if you experience any numbness or tingling of the fingers, toes, and mouth and we will tell you to take this medication.) for up to 21 days   Patient not taking: Reported on 10/24/2023   calcium carbonate-vitamin D 500 mg-5 mcg per tablet  Spouse/Significant Other No No   Sig: Take 2 tablets by mouth 3 (three) times a day with meals for 7 days, THEN 2 tablets 2 (two) times a day with meals for 7 days, THEN 2 tablets daily with breakfast for 7 days.    Patient taking differently: Take 1 tablets by mouth 2 (three) times a day with meals for 7 days, THEN 2 tablets daily with breakfast for 7 days. levothyroxine (Synthroid) 75 mcg tablet   No No   Sig: Take 1 tablet (75 mcg total) by mouth daily   magnesium Oxide (MAG-OX) 400 mg TABS Not Taking  No No   Sig: Take 1 tablet (400 mg total) by mouth 2 (two) times a day for 21 days   Patient not taking: Reported on 10/24/2023   magnesium oxide (MAG-OX) 400 mg tablet Not Taking  Yes No   Sig: TAKE 1 TABLET (400 MG TOTAL) BY MOUTH 2 (TWO) TIMES A DAY FOR 21 DAYS   Patient not taking: Reported on 10/24/2023      Facility-Administered Medications Last Administration Doses Remaining   EPINEPHrine (EPIPEN) injection 0.3 mg None recorded 2          No Known Allergies    Objective   Vitals:Blood pressure 137/86, pulse (!) 112, temperature 98.3 °F (36.8 °C), resp. rate 18, height 5' 5" (1.651 m), weight 47.9 kg (105 lb 9.6 oz), last menstrual period 10/09/2023, SpO2 97 %. ,Body mass index is 17.57 kg/m². Intake/Output Summary (Last 24 hours) at 10/26/2023 1624  Last data filed at 10/26/2023 1350  Gross per 24 hour   Intake 150 ml   Output --   Net 150 ml       Invasive Devices: Invasive Devices       Peripheral Intravenous Line  Duration             Peripheral IV 10/26/23 Left;Ventral (anterior) Forearm <1 day                  Physical Exam  Vitals reviewed. Constitutional:       General: She is not in acute distress. HENT:      Head: Normocephalic and atraumatic. Eyes:      Pupils: Pupils are equal, round, and reactive to light. Pulmonary:      Effort: Pulmonary effort is normal.   Skin:     General: Skin is warm and dry. Neurological:      Mental Status: She is alert. Gait: Gait is intact.    Psychiatric:         Speech: Speech normal.      Comments: Bizarre behavior, alternating between near catatonic state with upward gaze to impulsive behavior including singing/dancing/taking off clothes        Neurologic Exam     Mental Status   Speech: speech is normal   LOC alternates between upward gaze and eyelid fluttering without verbalization to singing and dancing, following commands. During episodes of upward gaze and eye fluttering, she is still able to follow commands such as squeezing hands and showing thumbs up bilaterally. There is no incontinence, evidence of tongue bite or post-ictal state. Episodes last anywhere from a few to 30 seconds     Cranial Nerves     CN III, IV, VI   Pupils are equal, round, and reactive to light. CN VII   Right facial weakness: none  Left facial weakness: none    CN XII   Tongue deviation: none  Limited exam due to lack of participation  Midline conjugate upgaze at times     Motor Exam Not formally assessed however moves all extremities with what appears to be equal strength. Ambulated to bathroom without difficulty. When arms raised above her head when not responding and  eyes fluttering, arms appear to be purposefully lowered to the side of her body as opposed to immediately falling on her head     Sensory Exam   Not formally assessed, no obvious sensory deficit      Gait, Coordination, and Reflexes     Gait  Gait: normal    Tremor   Resting tremor: absent      Lab Results: I have personally reviewed pertinent reports. Recent Results (from the past 24 hour(s))   Calcium, ionized    Collection Time: 10/26/23  4:09 PM   Result Value Ref Range    Calcium, Ionized 1.09 (L) 1.12 - 1.32 mmol/L       Imaging Studies: I have personally reviewed pertinent reports and I have personally reviewed pertinent films in PACS. EKG, Pathology, and Other Studies: I have personally reviewed pertinent reports. VTE Prophylaxis: Enoxaparin (Lovenox)      Assessment performed with Dr. Lyubov No at bedside. Images and labs reviewed with Dr. Lyubov No. Plan discussed with patient, family at bedside (, mom, and son), RN and primary service. Dictation voice to text software has been used in the creation of this document. Please consider this in light of any contextual or grammatical errors.

## 2023-10-26 NOTE — PROGRESS NOTES
Pt had 2 episodes where her body seemed to give out from under her completely. The first episode she was standing directly in front of her mother and collapsed onto her, she put all of her weight on her mother and was staring blankly out the window for about 30 seconds. The second time she was getting into bed and did not support herself to lower down she collapsed down onto the bed and was staring blankly for about 30 seconds. After both of these episodes patient had a burst of energy and got up singing and dancing again. Patient minimally redirectable by family, not redirectable at all by PCA / 1:1 sitter.  and mother present.  states it may be because she doesn't know us. He asked that while he is here he be the one to follow her around the room and redirect her in hopes for her to be more receptive to it.      RN present and NP aware  ER PCA 10/26/2023

## 2023-10-26 NOTE — CASE MANAGEMENT
Case Management Assessment & Discharge Planning Note    Patient name Goldy Rocha  Location [address] [facility] [place] 211/-01 MRN [id number]  : 1975 Date 10/26/2023       Current Admission Date: 10/24/2023  Current Admission Diagnosis:Acute metabolic encephalopathy   Patient Active Problem List    Diagnosis Date Noted    Acute metabolic encephalopathy     Hypomagnesemia 10/24/2023    Hypothyroidism 10/11/2023    Medullary thyroid carcinoma ([address]) 2023    Hypercalcemia 2023    Moderate episode of recurrent major depressive disorder ([address]) 9557    Pseudofolliculitis     URI, acute 2019    Cough 2018    Allergic state 2018    Melasma 2014    Pulmonary nodule seen on imaging study 10/09/2013      LOS (days): 2  Geometric Mean LOS (GMLOS) (days):   Days to GMLOS:     OBJECTIVE:  PATIENT READMITTED TO HOSPITAL  Risk of Unplanned Readmission Score: 14.65   Current admission status: Inpatient     Preferred Pharmacy:   [id number] [place] [address]  Phone: [phone] Fax: [phone]    Primary Care Provider: JERMAINE Gaytan  Primary Insurance: [employer]  Secondary Insurance:     ASSESSMENT:  [facility] Proxies    There are no active Health Care Proxies on file. Readmission Root Cause  30 Day Readmission: Yes  Who directed you to return to the hospital?: Family  Did you understand whom to contact if you had questions or problems?: Yes  Did you get your prescriptions before you left the hospital?: Yes  Were you able to get your prescriptions filled when you left the hospital?: Yes  Did you take your medications as prescribed?: Yes  Were you able to get to your follow-up appointments?: Yes  During previous admission, was a post-acute recommendation made?: No  Patient was readmitted due to: Per  pt has been exp change in mental status.  Pt had thyroidectomy on 10/11. Pt appears confused and disoriented to situation intermittently during triage. Action Plan: Ongoing medical review. CM will follow for updates and planning. Patient Information  Admitted from[de-identified] Home  Mental Status: Alert, Confused  Assessment information provided by[de-identified] Spouse  Primary Caregiver: Self  Support Systems: Spouse/significant other, Family members, [facility] of Residence: [address] [place] [address] do you live in?: [facility] entry access options. Select all that apply.: No steps to enter home  [facility]  In the last 12 months, was there a time when you were not able to pay the mortgage or rent on time?: No  In the last 12 months, how many places have you lived?: 1  In the last 12 months, was there a time when you did not have a steady place to sleep or slept in a shelter (including now)?: No  Homeless/housing insecurity resource given?: N/A  Living Arrangements: Lives w/ Spouse/significant other, Other (Comment) (Children)  Is patient a ?: No    Activities of Daily Living Prior to Admission  Functional Status: Independent  Completes ADLs independently?: Yes  Ambulates independently?: Yes  Does patient use assisted devices?: No  Does patient currently own DME?: No  Does patient have a history of Outpatient Therapy (PT/OT)?: No  Does the patient have a history of Short-Term Rehab?: No  Does patient have a history of HHC?: No  Does patient currently have [address]?: No    Patient Information Continued  Income Source:  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)  Does patient have prescription coverage?: Yes  Within the past 12 months, you worried that your food would run out before you got the money to buy more.: Never true  Within the past 12 months, the food you bought just didn't last and you didn't have money to get more.: Never true  Food insecurity resource given?: N/A  Does patient receive dialysis treatments?: No  Does patient have a history of substance abuse?: No  Does patient have a history of Mental Health Diagnosis?: No    PHQ 2/9 Screening   Reviewed PHQ 2/9 Depression Screening Score?: No    Means of Transportation  Means of Transport to Appts[de-identified] Drives Self  In the past 12 months, has lack of transportation kept you from medical appointments or from getting medications?: No  In the past 12 months, has lack of transportation kept you from meetings, work, or from getting things needed for daily living?: No  Was application for public transport provided?: N/A        DISCHARGE DETAILS:    Discharge planning discussed with[de-identified] Spouse at bedside. Freedom of Choice: Yes  Comments - Freedom of Choice: FOC maintained - CM introduced self and role. DCP TBD. Spouse's primary concern is insurance coverage. CM to reach out to insurance verifiers or hospital financial counselors for follow-up tomorrow. CM contacted family/caregiver?: Yes  Were Treatment Team discharge recommendations reviewed with patient/caregiver?: Yes (As it pertains to d/c planning and CM role to this point.)  Did patient/caregiver verbalize understanding of patient care needs?: Yes (As it pertains to d/c planning and CM role to this point.)  Were patient/caregiver advised of the risks associated with not following Treatment Team discharge recommendations?: Yes (As it pertains to d/c planning and CM role to this point.)    Contacts  Patient Contacts: Pete Maricle (spouse)  Relationship to Patient[de-identified] Family  Contact Method:  In Person  Reason/Outcome: Continuity of Care, Discharge [address]         Is the patient interested in [facility] at discharge?: No    DME Referral Provided  Referral made for DME?: No    Other Referral/Resources/Interventions Provided:  Interventions: None Indicated  Referral Comments: TBD    Would you like to participate in our [address] [facility] service program?  : No - Declined

## 2023-10-26 NOTE — PROGRESS NOTES
Pt resting comfortably at this time,  at bedside. NP aware of husbands request to postpone blood work at this time.      ER PCA 10/26/2023

## 2023-10-26 NOTE — ASSESSMENT & PLAN NOTE
Endocrinology consulted  Noted to have a TSH of 10, Ionized calcium level of 1.11  TSH elevated; increase levothyroxine to 88 mcg

## 2023-10-26 NOTE — QUICK NOTE
Notified by RN patient increasingly this evening becoming more agitated and now combative with mom who is in the room with her and fixated on not having IV. Even despite having IV out to help with de-escalation of agitation and combativeness patient now a risk of safety to others and self, interfering with medical treatment and unable to be verbally redirected to avoid self-harm. Vital signs stable. Also of note patient was given p.o. Zyprexa at 2020. Due to increasing agitation we will give one-time dose IM Haldol 2 mg, reviewed QTc of 459, EKG with sinus tachycardia.

## 2023-10-26 NOTE — UTILIZATION REVIEW
NOTIFICATION OF INPATIENT ADMISSION   AUTHORIZATION REQUEST   SERVICING FACILITY:   [address] [place], [place] [address]Jose Elias [address]ane[address]  Tax ID: [id number]  NPI: [id number] ATTENDING PROVIDER:  Attending Name and NPI#: Donato Garrido [[id number]]  Address: Jose Elias Jimenez[address]  Phone: [phone]     ADMISSION INFORMATION:  Place of Service: [address] [place] [address]  Place of Service Code: 21  Inpatient Admission Date/Time: 10/24/23  3:41 PM  Discharge Date/Time: No discharge date for patient encounter. Admitting Diagnosis Code/Description:  Sinus tachycardia [R00.0]  Hypomagnesemia [E83.42]  Delirium [R41.0]  Medullary thyroid carcinoma ([address]) [place]  H/O thyroidectomy [E89.0]  AMS (altered mental status) [R41.82]     UTILIZATION REVIEW CONTACT:  Ashley Angel Utilization   Network Utilization Review Department  Phone: [phone]  Fax [phone]  Email: [email or web address]  Contact for approvals/pending authorizations, clinical reviews, and discharge. PHYSICIAN ADVISORY SERVICES:  Medical Necessity Denial & [employer] Review  Phone: [phone]  Fax: [phone]  Email: [email or web address]     DISCHARGE SUPPORT TEAM:  For Patients Discharge Needs & Updates  Phone: [phone] opt. 2 Fax: [phone]  Email: [email or web address]

## 2023-10-26 NOTE — ASSESSMENT & PLAN NOTE
56 y.o.  female with medullary thyroid carcinoma s/p recent total thyroidectomy with lymph node dissection (10/11/2023) who presents to [facility] ED on 10/24/2023 with worsening AMS x 2 days and no sleep for 2-3 nights. She was progressively confused, had bizarre repetitive behaviors, periods of being minimally responsive with occasional eyelid fluttering, with only brief, partial response to multiple IM doses of neuroleptics in the ED and on the floor. Please refer to HPI for additional details. Work-up:  -Outpatient lab work prior to admission (10/23): Elevated TSH 8.66, free T4 WNL, PTH WNL  -Labs on presentation:  Mild leukocytosis, 10.21. Afebrile  Thrombocytosis, 464  Magnesium low, 1.69 ionized calcium low, 1.11->1.09  TSH elevated, 10.665  UA: Negative nitrite, 1-2 WBC, occasional bacteria  Labs WNL: Hepatic function panel, troponin, ethanol level, acetaminophen level, salicylate level, RDU, T3, b12 (419), RPR, folate, Mg. 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 No abnormal parenchymal leptomeningeal enhancement identified. Minimal nonspecific white matter FLAIR signal hyperintensity. 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 EEG (10/27): Normal    Acute encephalopathy, unclear etiology; suspected delirium in the setting of sleep deprivation vs functional/primary psychiatric etiology given negative workup and improvement with psychiatric treatments (depakote recommended by psychiatry as a mood stabilizer), although CSF was not analyzed.   In this context, unclear what to make of the elevated thyroglobulin Ab (118.3) that returned on 10/28. She started improving after taking 50mg seroquel the night of 10/26, after which she slept well (6-8 hours), for the first time in several days. As of 10/28, she is much improved, with her family reporting her being 95% back to normal, however, she still has difficulty following commands, and seems to have limited understanding of the situation. On the day of discharge, her  reported that the day she started becoming altered, their son had dislocated his arm while lifting weights, and was yelling in pain behind a locked door. When this occurred, she was in the shower, heard this happen, and ran and broke the door down to get to him, followed by taking him to the hospital.  This may have been the trigger this episode of delirium as she was continually focused on the time 11:11 when she was very confused, and still wants to leave the hospital today at 11:11, which was the time she had broken into their son's room. Plan:  - Fall precautions  - pending labs: ens2, hsv, Antimicrosomal Ab, B1  - Conservative management of delirium: minimize noise, maintain day/night cycle, provide frequent redirection, avoid restraints if possible, etc.   - appreciate psychiatry evaluation and recommendations. - Medical management and supportive care per primary team, notify with changes  - Seems reasonable to discharge her on a mood stabilizer and seroquel 25-50mg QHS PRN, details deferred to psychiatry. - appreciate endocrinology evaluation and recommendations, to whom synthroid dose management and interpretation of elevated thyroglobulin Abs is deferred. - if encephalopathy persists or is recurrent with otherwise negative work-up, would consider LP  - It seems reasonable for her to be discharged home today given her ongoing improvement. She will have family supervision 24hrs a day (mother, , son).   Explained to her  that she cannot drive until she completely returns to normal and she is cleared by psychiatry and neurology. He was in full agreement with this recommendation.

## 2023-10-26 NOTE — CONSULTS
TeleConsultation - [address] [facility] 56 y.o. female MRN: [id number]  Unit/Bed#: -01 Encounter: [id number]        REQUIRED DOCUMENTATION:     1. This service was provided via Telemedicine. 2. Provider located at [facility]. 3. TeleMed provider: Nighat Moore MD.  4. Identify all parties in room with patient during tele consult:   and son  11. Patient was then informed that this was a Telemedicine visit and that the exam was being conducted confidentially over secure lines. My office door was closed. No one else was in the room. Patient acknowledged consent and understanding of privacy and security of the Telemedicine visit, and gave us permission to have the assistant stay in the room in order to assist with the history and to conduct the exam.  I informed the patient that I have reviewed their record in Epic and presented the opportunity for them to ask any questions regarding the visit today. The patient agreed to participate.        Assessment/Plan     Present on Admission:   Medullary thyroid carcinoma (HCC)   Hypothyroidism   Acute metabolic encephalopathy   Hypomagnesemia    Assessment:  patient's intermittent agitation and as well as worsening disorientation is suggestive of delirium     Delirium- AMS  Unspecified mood disorder; rule out mood disorder secondary to other physiological condition (in setting of hypothyroidism status post thyroidectomy); acute encephalopathy/delirium on admission in setting of hypothyroidism     Treatment Plan:  Pt is unable to determine what medications she requires and has been noncompliant with oral meds- she has not slept in days-  agrees with plan below  - would consider Depakote 250 mg bid in place of Lamictal for now- can give IV  - continue to use Zyprexa PRN for agitation- can use IM if refuses po- per hospital protocol- max dose 30 mg in 24 hours and can alternate with Haldol 5 mg po/IM/IV  - would offer seroquel 50 mg qhs-  if pt extremely agitated and refuses seroquel would give one time dose of Haldol 5 mg and Ativan 2 mg- to assist with sleep, for tonight  In general- avoid benzos/anticholinergics- but pt has been up for 4 days    Delirium precautions   Avoid benzodiazepines, anticholinergics, antihistamines and opioids whenever possible     If opioids must be used, prefer oxycodone or hydromorphone  - Mobilize as soon as safely possible. - Reorient frequently, using calenders, television. Enlist the help of family members. - Maintain sleep/wake cycle normalcy as much as possible. - Keep awake during the day - avoid daytime naps  - Allow to sleep at night with as few interruptions as possible  - Invite family members to stay with the patient to reduce suspiciousness and paranoia  - Listen to patient concerns and provide reassurance. Delirium is often remembered as a frightening experience   - Agitation and confusion may cause danger to self and others. Use mittens, geriatric chair and restraints if necessary  - Applying the fewest restraints possible and release as soon as possible     Continue with sitter and delirium precautions  Pt requires medical clearance and safe discharge planning- currently she is disoriented and  has concerns about taking her  home in this state    Due to the rapid onset of her confusion and no psychiatric history prior to her surgery- would determine this is medical cause in nature. Patient's disorientation could take more than a few days to resolve. If patient's mental status continues to not improve with mood stabilizer and PRNs, may have to consider admission to psychiatry for stabilization.  -         Per psych note yesterday: Consider lamotrigine 25 mg p.o. twice daily as mood stabilizer and antidepressant. The patient prefers to further discuss this with her attending physician before giving consent for this medication. May continue Zyprexa as needed as currently ordered.   Upon discharge recommend follow-up with outpatient psychiatry for further assessment and medication management. A psychotherapy/counseling component would also be helpful to assist the patient in optimizing her coping skills at this time. No suicide precautions are indicated. Reconsult psychiatry as needed. Planned Medication Changes:    As above- dicussed with NP Flavio Rivas    Current Medications:     Current Facility-Administered Medications   Medication Dose Route Frequency Provider Last Rate    acetaminophen  650 mg Oral Q6H PRN Mary Ojeda MD      aluminum-magnesium hydroxide-simethicone  30 mL Oral Q6H PRN Mary Ojeda MD      calcitriol  0.25 mcg Oral Daily Mary Ojeda MD      calcium carbonate-vitamin D  1 tablet Oral TID With Meals Mary Ojeda MD      enoxaparin  40 mg Subcutaneous Daily Mary Ojeda MD      levothyroxine  88 mcg Oral Early Morning Cristobalabigail Ivey MD      magnesium hydroxide  30 mL Oral Daily PRN Mary Ojeda MD      magnesium Oxide  400 mg Oral BID Mary Ojeda MD      magnesium sulfate  4 g Intravenous Once Mary Ojeda MD      multivitamin-minerals  1 tablet Oral Daily Mary Ojeda MD      OLANZapine  5 mg Oral TID PRN Mary Ojeda MD      ondansetron  4 mg Intravenous Q6H PRN Mary Ojeda MD         Risks / Benefits of Treatment:    Risks, benefits, and possible side effects of medications explained to patient and patient verbalizes understanding. Other treatment modalities recommended as indicated:    outpatient referral      Inpatient consult to Psychiatry  Consult performed by:  Royce Wasserman MD  Consult ordered by: JERMAINE Bryson        Physician Requesting Consult: Cristy Pereira DO  Principal Problem:Acute metabolic encephalopathy    Reason for Consult:  delirium      History of Present Illness      Patient is a 56 y.o. female who has presented to the hospital where the following is documented in the H&P:  Jaren Montenegro is a 56 y.o. female who presents with no psychiatric history     Patient seen via telepsych with , son and mother present  Patient is staring blindly, unable to answer questions, smiling/laughing. She starts singing in [place]     and son- say that she has no psych history. After the surgery for thyroid on 10/11- she started to act off. She hasn’t slept in 4 days and is acting agitated. She does not want to take any medication. Per RN: At 10 am she got Haldol 2 mg and then 5 mg IM, slept for 1 hour and then woke up with burst of energy. Psychiatric Review Of Systems:    Negative except as above  History obtained from the patient  General ROS: positive for  - fatigue  Psychological ROS: positive for - disorientation  Ophthalmic ROS: negative  Respiratory ROS: no cough, shortness of breath, or wheezing  Cardiovascular ROS: no chest pain or dyspnea on exertion  Gastrointestinal ROS: no abdominal pain, change in bowel habits, or black or bloody stools  Genito-Urinary ROS: no dysuria, trouble voiding, or hematuria  Musculoskeletal ROS: positive for - muscular weakness  Neurological ROS: no TIA or stroke symptoms  Hematological ROS- No active bleeding  Otherwise, all other 12 point review of systems normal.    Historical Information     Past Psychiatric History:     none    Substance Abuse History:    none    Family Psychiatric History:      none    Social History:    Lives with  and teenage son    Traumatic History:     nnone    History reviewed. No pertinent past medical history.     Medical Review Of Systems:    Review of Systems    Meds/Allergies       No Known Allergies    Objective     Vital signs in last 24 hours:  Temp:  [97.9 °F (36.6 °C)-98.3 °F (36.8 °C)] 98.3 °F (36.8 °C)  HR:  [] 112  Resp:  [18] 18  BP: (123-137)/(80-86) 137/86    No intake or output data in the 24 hours ending 10/26/23 1310    Mental Status Evaluation:    Appearance:  age appropriate   Behavior:  Confused, elated, AAOx0   Speech:  Singing, not speaking   Mood:  euphoric   Affect:  labile   Language: Singing in [place]   Thought Process:  illogical   Associations Unable to assess   Thought Content:  Unable to assess   Perceptual Disturbances: None   Risk Potential: Suicidal Ideations none  Homicidal Ideations none  Potential for Aggression No   Sensorium:  X 0   Cognition:  patient does not answer   Consciousness:  awake    Attention: attention span appeared shorter than expected for age   Intellect: within normal limits   Fund of Knowledge: Unable to assess   Insight:  poor   Judgment: poor                   Lab Results: I have personally reviewed all pertinent laboratory/tests results. Most Recent Labs:   Lab Results   Component Value Date    WBC 8.13 10/25/2023    RBC 3.92 10/25/2023    HGB 12.5 10/25/2023    HCT 37.6 10/25/2023     (H) 10/25/2023    RDW 12.5 10/25/2023    NEUTROABS 5.58 10/25/2023    SODIUM 139 10/25/2023    K 3.8 10/25/2023     10/25/2023    CO2 26 10/25/2023    BUN 10 10/25/2023    CREATININE 0.59 (L) 10/25/2023    GLUC 102 10/25/2023    GLUF 102 (H) 10/23/2023    CALCIUM 8.8 10/25/2023    AST 16 10/25/2023    ALT 13 10/25/2023    ALKPHOS 46 10/25/2023    TP 7.2 10/25/2023    ALB 4.1 10/25/2023    TBILI 0.48 10/25/2023    CHOLESTEROL 186 12/16/2022    HDL 76 12/16/2022    TRIG 75 12/16/2022    LDLCALC 95 12/16/2022    NONHDLC 110 12/16/2022    KGZ4LMWZOVZE 10.665 (H) 10/24/2023    FREET4 0.86 10/23/2023    HGBA1C 5.1 12/16/2022     12/16/2022       Imaging Studies: CT head without contrast    Result Date: 10/24/2023  Narrative: CT BRAIN - WITHOUT CONTRAST INDICATION:   Delirium AMS. COMPARISON:  None. TECHNIQUE:  CT examination of the brain was performed. Multiplanar 2D reformatted images were created from the source data. Radiation dose length product (DLP) for this visit:  778 mGy-cm .   This examination, like all CT scans performed in the [facility], was performed utilizing techniques to minimize radiation dose exposure, including the use of iterative reconstruction and automated exposure control. IMAGE QUALITY:  Diagnostic. FINDINGS: PARENCHYMA: Rounded 1 cm hypodense lesion in the medial aspect of the right temporal lobe, series 2 image 14, perhaps a choroidal fissure cyst or prominent perivascular space, incompletely characterized. No mass effect. No acute intracranial hemorrhage. VENTRICLES AND EXTRA-AXIAL SPACES:  Normal for the patient's age. VISUALIZED ORBITS: Normal visualized orbits. PARANASAL SINUSES: Normal visualized paranasal sinuses. CALVARIUM AND EXTRACRANIAL SOFT TISSUES:  Normal.     Impression: 1. No acute intracranial hemorrhage or mass effect. No hydrocephalus. 2. Rounded 1 cm hypodense lesion in the right temporal lobe may represent a choroidal fissure cyst or prominent perivascular space. Other lesions not excluded. Follow-up contrast-enhanced MRI of the brain suggested for further characterization. Workstation performed: [id number]     EKG/Pathology/Other Studies:   Lab Results   Component Value Date    VENTRATE 111 10/24/2023    ATRIALRATE 111 10/24/2023    PRINT 136 10/24/2023    QRSDINT 66 10/24/2023    QTINT 338 10/24/2023    QTCINT 459 10/24/2023    PAXIS 83 10/24/2023    QRSAXIS 94 10/24/2023    TWAVEAXIS 61 10/24/2023        Code Status: Level 1 - Full Code  Advance Directive and Living Will:      Power of :    POLST:      Screenings:    1. Nutrition Screening  Nutrition Assessment (completed by Staff): Nutrition  Feeding: Able to feed self  Diet Type: Regular/House  Appetite: Other (Comment)    2. Pain Screening  Pain Screening: Pain Assessment  Pain Assessment Tool: 0-10  Pain Score: 0    3. Suicide Screening  ED Crisis Suicide Risk Assessment:        Counseling / Coordination of Care: Total floor / unit time spent today 35 minutes.  Greater than 50% of total time was spent with the patient and / or family counseling and / or coordination of care.  A description of the counseling / coordination of care: RN, NP and family

## 2023-10-26 NOTE — PLAN OF CARE
Problem: Potential for Falls  Goal: Patient will remain free of falls  Description: INTERVENTIONS:  - Educate patient/family on patient safety including physical limitations  - Instruct patient to call for assistance with activity   - Consult OT/PT to assist with strengthening/mobility   - Keep Call bell within reach  - Keep bed low and locked with side rails adjusted as appropriate  - Keep care items and personal belongings within reach  - Initiate and maintain comfort rounds  - Make Fall Risk Sign visible to staff  - Offer Toileting every  Hours, in advance of need  - Initiate/Maintain alarm  - Obtain necessary fall risk management equipment:   - Apply yellow socks and bracelet for high fall risk patients  - Consider moving patient to room near nurses station  Outcome: Progressing     Problem: MOBILITY - ADULT  Goal: Maintain or return to baseline ADL function  Description: INTERVENTIONS:  -  Assess patient's ability to carry out ADLs; assess patient's baseline for ADL function and identify physical deficits which impact ability to perform ADLs (bathing, care of mouth/teeth, toileting, grooming, dressing, etc.)  - Assess/evaluate cause of self-care deficits   - Assess range of motion  - Assess patient's mobility; develop plan if impaired  - Assess patient's need for assistive devices and provide as appropriate  - Encourage maximum independence but intervene and supervise when necessary  - Involve family in performance of ADLs  - Assess for home care needs following discharge   - Consider OT consult to assist with ADL evaluation and planning for discharge  - Provide patient education as appropriate  Outcome: Progressing  Goal: Maintains/Returns to pre admission functional level  Description: INTERVENTIONS:  - Perform BMAT or MOVE assessment daily.   - Set and communicate daily mobility goal to care team and patient/family/caregiver.    - Collaborate with rehabilitation services on mobility goals if consulted  - Perform Range of Motion times a day. - Reposition patient every  hours.   - Dangle patient times a day  - Stand patient  times a day  - Ambulate patient  times a day  - Out of bed to chair times a day   - Out of bed for meals  times a day  - Out of bed for toileting  - Record patient progress and toleration of activity level   Outcome: Progressing     Problem: PAIN - ADULT  Goal: Verbalizes/displays adequate comfort level or baseline comfort level  Description: Interventions:  - Encourage patient to monitor pain and request assistance  - Assess pain using appropriate pain scale  - Administer analgesics based on type and severity of pain and evaluate response  - Implement non-pharmacological measures as appropriate and evaluate response  - Consider cultural and social influences on pain and pain management  - Notify physician/advanced practitioner if interventions unsuccessful or patient reports new pain  Outcome: Progressing     Problem: INFECTION - ADULT  Goal: Absence or prevention of progression during hospitalization  Description: INTERVENTIONS:  - Assess and monitor for signs and symptoms of infection  - Monitor lab/diagnostic results  - Monitor all insertion sites, i.e. indwelling lines, tubes, and drains  - Monitor endotracheal if appropriate and nasal secretions for changes in amount and color  - North appropriate cooling/warming therapies per order  - Administer medications as ordered  - Instruct and encourage patient and family to use good hand hygiene technique  - Identify and instruct in appropriate isolation precautions for identified infection/condition  Outcome: Progressing     Problem: SAFETY ADULT  Goal: Patient will remain free of falls  Description: INTERVENTIONS:  - Educate patient/family on patient safety including physical limitations  - Instruct patient to call for assistance with activity   - Consult OT/PT to assist with strengthening/mobility   - Keep Call bell within reach  - Keep bed low and locked with side rails adjusted as appropriate  - Keep care items and personal belongings within reach  - Initiate and maintain comfort rounds  - Make Fall Risk Sign visible to staff  - Offer Toileting every Hours, in advance of need  - Initiate/Maintain alarm  - Obtain necessary fall risk management equipment:  - Apply yellow socks and bracelet for high fall risk patients  - Consider moving patient to room near nurses station  Outcome: Progressing  Goal: Maintain or return to baseline ADL function  Description: INTERVENTIONS:  -  Assess patient's ability to carry out ADLs; assess patient's baseline for ADL function and identify physical deficits which impact ability to perform ADLs (bathing, care of mouth/teeth, toileting, grooming, dressing, etc.)  - Assess/evaluate cause of self-care deficits   - Assess range of motion  - Assess patient's mobility; develop plan if impaired  - Assess patient's need for assistive devices and provide as appropriate  - Encourage maximum independence but intervene and supervise when necessary  - Involve family in performance of ADLs  - Assess for home care needs following discharge   - Consider OT consult to assist with ADL evaluation and planning for discharge  - Provide patient education as appropriate  Outcome: Progressing  Goal: Maintains/Returns to pre admission functional level  Description: INTERVENTIONS:  - Perform BMAT or MOVE assessment daily.   - Set and communicate daily mobility goal to care team and patient/family/caregiver. - Collaborate with rehabilitation services on mobility goals if consulted  - Perform Range of Motion  times a day. - Reposition patient every  hours.   - Dangle patient  times a day  - Stand patient  times a day  - Ambulate patient  times a day  - Out of bed to chair times a day   - Out of bed for meals  times a day  - Out of bed for toileting  - Record patient progress and toleration of activity level   Outcome: Progressing     Problem: DISCHARGE PLANNING  Goal: Discharge to home or other facility with appropriate resources  Description: INTERVENTIONS:  - Identify barriers to discharge w/patient and caregiver  - Arrange for needed discharge resources and transportation as appropriate  - Identify discharge learning needs (meds, wound care, etc.)  - Arrange for interpretive services to assist at discharge as needed  - Refer to Case Management Department for coordinating discharge planning if the patient needs post-hospital services based on physician/advanced practitioner order or complex needs related to functional status, cognitive ability, or social support system  Outcome: Progressing     Problem: Knowledge Deficit  Goal: Patient/family/caregiver demonstrates understanding of disease process, treatment plan, medications, and discharge instructions  Description: Complete learning assessment and assess knowledge base.   Interventions:  - Provide teaching at level of understanding  - Provide teaching via preferred learning methods  Outcome: Progressing

## 2023-10-26 NOTE — MALNUTRITION/BMI
This medical record reflects underweight BMI. BMI Findings:  Adult BMI Classifications: Underweight < 18.5        Body mass index is 17.57 kg/m². See Nutrition note dated 10/26/2023 for additional details. Completed nutrition assessment is viewable in the nutrition documentation.

## 2023-10-26 NOTE — NURSING NOTE
Pt restless, agitated, yelling and sometimes combative through out the night. Could not redirect patient and pt was not following commands. Zyprexa and Haldol ineffective. Mom at bedside as well as a staff visual observer. No IV access as pt pulled out IV and on call provider was notified. Will cont monitor for safety.

## 2023-10-26 NOTE — PROGRESS NOTES
[address]  Progress Note  Name: Kelsi Torrez  MRN: [id number]  Unit/Bed#: -01 I Date of Admission: 10/24/2023   Date of Service: 10/26/2023 I Hospital Day: 2    Assessment/Plan   * Acute metabolic encephalopathy  Assessment & Plan  Patient presented to the ED with complaints of change in mental status over the last 48 hours prior to admission. She is s/p thyroidectomy on 10/11 in setting of medullary thyroid carcinoma. Is increasingly agitated, and confused have periods of excessive energy where patient is walking about, and dancing and then has a sudden decrease where she nearly collapses to the bed with a couch and does not respond to questions  Endocrine consulted  Recommend optimizing Synthroid, possibly repeating 24-hour urine and possible MRI for metastasis  Psych consulted  Recommending starting Lamictal however patient was apprehensive of this yesterday  As needed Haldol  Patient is also not slept for 3 to 4 days she could be contributing to encephalopathy  Continue frequent neurochecks    Hypomagnesemia  Assessment & Plan  Present on admission, evidenced by a magnesium level of 1.6  Repleted with 4 g  Corrected to 2.1  Resolved with supplementation    Hypothyroidism  Assessment & Plan  Endocrinology consulted  Noted to have a TSH of 10, Ionized calcium level of 1.11  TSH elevated; increase levothyroxine to 88 mcg    Medullary thyroid carcinoma [facility])  Assessment & Plan  Patient is s/p total thyroidectomy for medullary cancer of the thyroid on 10/11  Endocrinology consulted  Recommend increasing Synthroid to 88 mcg  Psych consultation  Suggest MRI imaging to rule out metastasis however patient is unable to cooperate at this current time we will continue to reassess and complete MRI when able  will consider repeating a 24-hour urine collection to include 5-HIAA, catecholamines, metanephrines, dopamine, VMA etc.  R/o hypercortisolism but could also be psuedocushing's. VTE Pharmacologic Prophylaxis:   Moderate Risk (Score 3-4) - Pharmacological DVT Prophylaxis Ordered: enoxaparin (Lovenox). Patient Centered Rounds: I performed bedside rounds with nursing staff today. Discussions with Specialists or Other Care Team Provider: Previous providers notes    Education and Discussions with Family / Patient: Discussed with patient and     Total Time Spent on Date of Encounter in care of patient: 45 mins. This time was spent on one or more of the following: performing physical exam; counseling and coordination of care; obtaining or reviewing history; documenting in the medical record; reviewing/ordering tests, medications or procedures; communicating with other healthcare professionals and discussing with patient's family/caregivers. Current Length of Stay: 2 day(s)  Current Patient Status: Inpatient   Certification Statement: The patient will continue to require additional inpatient hospital stay due to return of baseline mentation  Discharge Plan: Anticipate discharge in 24-48 hrs to home. Code Status: Level 1 - Full Code    Subjective:   Patient is nonverbal when I see her at this time currently she is staring into the mirror and just taking deep breaths she is not answering any questions she is making eye contact Per discussion with the nurse and the one-to-one she was previously walking around the room and dancing and speaking in [place] and then from that. She then transitions to a near syncopal episode where she just drops to the floor/couch/bed and stares but reports that she has not slept for about 4 days    Objective:     Vitals:   Temp (24hrs), Av.1 °F (36.7 °C), Min:97.9 °F (36.6 °C), Max:98.3 °F (36.8 °C)    Temp:  [97.9 °F (36.6 °C)-98.3 °F (36.8 °C)] 98.3 °F (36.8 °C)  HR:  [] 112  Resp:  [18] 18  BP: (123-137)/(80-86) 137/86  SpO2:  [96 %-97 %] 97 %  Body mass index is 17.57 kg/m².      Input and Output Summary (last 24 hours):   No intake or output data in the 24 hours ending 10/26/23 1035    Physical Exam:   Physical Exam  Vitals and nursing note reviewed. Exam conducted with a chaperone present. Constitutional:       General: She is in acute distress. Appearance: She is ill-appearing. Neurological:      Mental Status: She is disoriented, confused and unresponsive. Motor: No weakness. Psychiatric:         Attention and Perception: She is inattentive. Mood and Affect: Mood is anxious. Judgment: Judgment is inappropriate.           Additional Data:     Labs:  Results from last 7 days   Lab Units 10/25/23  0839   WBC Thousand/uL 8.13   HEMOGLOBIN g/dL 12.5   HEMATOCRIT % 37.6   PLATELETS Thousands/uL 407*   NEUTROS PCT % 69   LYMPHS PCT % 26   MONOS PCT % 4   EOS PCT % 0     Results from last 7 days   Lab Units 10/25/23  0839   SODIUM mmol/L 139   POTASSIUM mmol/L 3.8   CHLORIDE mmol/L 106   CO2 mmol/L 26   BUN mg/dL 10   CREATININE mg/dL 0.59*   ANION GAP mmol/L 7   CALCIUM mg/dL 8.8   ALBUMIN g/dL 4.1   TOTAL BILIRUBIN mg/dL 0.48   ALK PHOS U/L 46   ALT U/L 13   AST U/L 16   GLUCOSE RANDOM mg/dL 102         Results from last 7 days   Lab Units 10/24/23  1301   POC GLUCOSE mg/dl 124               Lines/Drains:  Invasive Devices       None                   Recent Cultures (last 7 days):         Last 24 Hours Medication List:   Current Facility-Administered Medications   Medication Dose Route Frequency Provider Last Rate    acetaminophen  650 mg Oral Q6H PRN Salvador Rosenthal MD      aluminum-magnesium hydroxide-simethicone  30 mL Oral Q6H PRN Salvador Rosenthal MD      calcitriol  0.25 mcg Oral Daily Salvador Rosenthal MD      calcium carbonate-vitamin D  1 tablet Oral TID With Sim Pike MD      enoxaparin  40 mg Subcutaneous Daily Salvador Rosenthal MD      levothyroxine  88 mcg Oral Early Morning Alex Vega MD      magnesium hydroxide  30 mL Oral Daily PRN Salvador Rosenthal MD magnesium Oxide  400 mg Oral BID Joel Amezcua, MD      magnesium sulfate  4 g Intravenous Once Joel Amezcua, MD      multivitamin-minerals  1 tablet Oral Daily oJel Amezcua, MD      OLANZapine  5 mg Oral TID PRN Joel Amezcua, MD      ondansetron  4 mg Intravenous Q6H PRN Joel Amezcua, MD          Today, Patient Was Seen By: JERMAINE Loera    **Please Note: This note may have been constructed using a voice recognition system. **

## 2023-10-26 NOTE — ASSESSMENT & PLAN NOTE
1. Have you been to the ER, urgent care clinic since your last visit? Hospitalized since your last visit? No-not since surgery-12/13/18 lap peace-in [facility]    2. Have you seen or consulted any other health care providers outside of the [address] [place] [address] since your last visit? Include any pap smears or colon screening.  No Patient presented to the ED with complaints of change in mental status over the last 48 hours prior to admission. She is s/p thyroidectomy on 10/11 in setting of medullary thyroid carcinoma.     Is increasingly agitated, and confused have periods of excessive energy where patient is walking about, and dancing and then has a sudden decrease where she nearly collapses to the bed with a couch and does not respond to questions  Endocrine consulted  Recommend optimizing Synthroid, possibly repeating 24-hour urine and possible MRI for metastasis  Psych consulted  Recommending starting Lamictal however patient was apprehensive of this yesterday  As needed Haldol  Patient is also not slept for 3 to 4 days she could be contributing to encephalopathy  Continue frequent neurochecks

## 2023-10-26 NOTE — NURSING NOTE
Pt is restless, requesting IV to be taken out so that she can go home.  at bedside is requesting medication to help calm pt. Zyprexa given. Will cont to monitor.

## 2023-10-27 ENCOUNTER — APPOINTMENT (INPATIENT)
Dept: NEUROLOGY | Facility: HOSPITAL | Age: 48
DRG: 071 | End: 2023-10-27
Payer: COMMERCIAL

## 2023-10-27 ENCOUNTER — APPOINTMENT (INPATIENT)
Dept: MRI IMAGING | Facility: HOSPITAL | Age: 48
DRG: 071 | End: 2023-10-27
Payer: COMMERCIAL

## 2023-10-27 PROBLEM — R41.0 DELIRIUM: Status: ACTIVE | Noted: 2023-10-27

## 2023-10-27 LAB
CALCIUM 24H UR-MCNC: 39.6 MG/24 HRS (ref 100–300)
SPECIMEN VOL UR: 1200 ML

## 2023-10-27 PROCEDURE — 82340 ASSAY OF CALCIUM IN URINE: CPT | Performed by: NURSE PRACTITIONER

## 2023-10-27 PROCEDURE — 95816 EEG AWAKE AND DROWSY: CPT

## 2023-10-27 PROCEDURE — 82507 ASSAY OF CITRATE: CPT | Performed by: NURSE PRACTITIONER

## 2023-10-27 PROCEDURE — 82530 CORTISOL FREE: CPT | Performed by: NURSE PRACTITIONER

## 2023-10-27 PROCEDURE — 99233 SBSQ HOSP IP/OBS HIGH 50: CPT | Performed by: NURSE PRACTITIONER

## 2023-10-27 PROCEDURE — A9585 GADOBUTROL INJECTION: HCPCS | Performed by: NURSE PRACTITIONER

## 2023-10-27 PROCEDURE — 82384 ASSAY THREE CATECHOLAMINES: CPT | Performed by: NURSE PRACTITIONER

## 2023-10-27 PROCEDURE — 83521 IG LIGHT CHAINS FREE EACH: CPT | Performed by: NURSE PRACTITIONER

## 2023-10-27 PROCEDURE — 83835 ASSAY OF METANEPHRINES: CPT | Performed by: NURSE PRACTITIONER

## 2023-10-27 PROCEDURE — 82300 ASSAY OF CADMIUM: CPT | Performed by: NURSE PRACTITIONER

## 2023-10-27 PROCEDURE — 83586 ASSAY 17- KETOSTEROIDS: CPT | Performed by: NURSE PRACTITIONER

## 2023-10-27 PROCEDURE — 83497 ASSAY OF 5-HIAA: CPT | Performed by: NURSE PRACTITIONER

## 2023-10-27 PROCEDURE — 70553 MRI BRAIN STEM W/O & W/DYE: CPT

## 2023-10-27 PROCEDURE — 95816 EEG AWAKE AND DROWSY: CPT | Performed by: PSYCHIATRY & NEUROLOGY

## 2023-10-27 PROCEDURE — 82175 ASSAY OF ARSENIC: CPT | Performed by: NURSE PRACTITIONER

## 2023-10-27 RX ORDER — LANOLIN ALCOHOL/MO/W.PET/CERES
1 CREAM (GRAM) TOPICAL 2 TIMES DAILY WITH MEALS
Status: DISCONTINUED | OUTPATIENT
Start: 2023-10-27 | End: 2023-10-28 | Stop reason: HOSPADM

## 2023-10-27 RX ORDER — LORAZEPAM 2 MG/ML
2 INJECTION INTRAMUSCULAR ONCE
Status: DISCONTINUED | OUTPATIENT
Start: 2023-10-27 | End: 2023-10-28 | Stop reason: HOSPADM

## 2023-10-27 RX ORDER — GADOBUTROL 604.72 MG/ML
4 INJECTION INTRAVENOUS
Status: COMPLETED | OUTPATIENT
Start: 2023-10-27 | End: 2023-10-27

## 2023-10-27 RX ADMIN — Medication 400 MG: at 17:47

## 2023-10-27 RX ADMIN — LORAZEPAM 2 MG: 2 INJECTION INTRAMUSCULAR; INTRAVENOUS at 22:54

## 2023-10-27 RX ADMIN — VALPROATE SODIUM 250 MG: 100 INJECTION, SOLUTION INTRAVENOUS at 10:52

## 2023-10-27 RX ADMIN — Medication 1 TABLET: at 10:48

## 2023-10-27 RX ADMIN — OLANZAPINE 5 MG: 5 TABLET, ORALLY DISINTEGRATING ORAL at 10:52

## 2023-10-27 RX ADMIN — VALPROATE SODIUM 250 MG: 100 INJECTION, SOLUTION INTRAVENOUS at 20:04

## 2023-10-27 RX ADMIN — OLANZAPINE 5 MG: 5 TABLET, ORALLY DISINTEGRATING ORAL at 17:51

## 2023-10-27 RX ADMIN — GADOBUTROL 4 ML: 604.72 INJECTION INTRAVENOUS at 14:50

## 2023-10-27 RX ADMIN — Medication 400 MG: at 10:48

## 2023-10-27 RX ADMIN — CALCITRIOL CAPSULES 0.25 MCG 0.25 MCG: 0.25 CAPSULE ORAL at 10:48

## 2023-10-27 RX ADMIN — LEVOTHYROXINE SODIUM 88 MCG: 88 TABLET ORAL at 06:56

## 2023-10-27 RX ADMIN — Medication 1 TABLET: at 17:50

## 2023-10-27 RX ADMIN — QUETIAPINE FUMARATE 50 MG: 25 TABLET ORAL at 20:06

## 2023-10-27 NOTE — PROGRESS NOTES
Progress Note - Neurology   Dante Arevalo 56 y.o. female [id number]  Unit/Bed#: /-01    Assessment/Plan:  Dante Arevalo is a 56 y.o. female    * Acute metabolic encephalopathy  Assessment & Plan  56 y.o.  female with medullary thyroid carcinoma s/p recent total thyroidectomy with lymph node dissection (10/11/2023) who presents to [facility] ED on 10/24/2023 with AMS x 2 days. 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 No abnormal parenchymal leptomeningeal enhancement identified. Minimal nonspecific white matter FLAIR signal hyperintensity.   -Labs on presentation:  Mild leukocytosis, 10.21. Afebrile  Thrombocytosis, 464  Magnesium low, 1.69 ionized calcium low, 1.11->1.09  TSH elevated, 10.665  UA: Negative nitrite, 1-2 WBC, occasional bacteria  Labs WNL: Hepatic function panel, troponin, ethanol level, acetaminophen level, salicylate level, RDU, T3, b12 (419), RPR, folate, Mg. Phos    Acute encephalopathy, unclear etiology; suspected delirium in the setting of sleep deprivation vs functional etiology, seizures less likely    Plan:  - Routine EEG pending  - Seizure precautions  - pending labs: ens2, hsv, thyroid Ab, B1  - Conservative management of delirium: minimize noise, maintain day/night cycle, provide frequent redirection, avoid restraints if possible, etc.   - Medical management and supportive care per primary team, notify with changes  - defer antipsychotics/mood stabilizing agents to psychiatry/primary service.  Monitor Qtc  - synthroid per endocrinology  - if encephalopathy persist with otherwise negative work-up, could consider LP Recommendations for outpatient neurological follow up have yet to be determined. Subjective:   Per chart review and conversation with medical team and family, patient slept from about [phone] getting up twice to use the bathroom without difficulty. She initially appeared appropriate when she woke up. Then around 0800 she started to become repetitive, focused on the number 1111. On my exam she was not interacting or engaging in conversation. She continued to look at her mother and appears almost child-like, wanting to be held. She was able to follow simple commands but would not answer questions. She had one brief episode of eyes fluttering but had no gaze preference or post ictal state. She is slightly better today compares to yesterday as she seems less impulsive. 24hr events:  Has not received haldol since 10/26 ~3pm  Depacon started 10/26 ~4pm; missed mn dose ( request)  Seroquel started 10/26 9pm  Zyprexa 5mg x1 10/27 at 1052      History reviewed. No pertinent past medical history.   Past Surgical History:   Procedure Laterality Date    IN THYROIDECTOMY TOTAL/SUBTOTAL LMTD NECK DISSECT N/A 10/11/2023    Procedure: TOTAL THYROIDECTOMY, BILATERAL CENTRAL COMPARTMENT NECK DISSECTION WITH NIMS;  Surgeon: Johnna Pendleton MD;  Location: [facility];  Service: ENT    US GUIDED THYROID BIOPSY  08/18/2023    WISDOM TOOTH EXTRACTION       Family History   Problem Relation Age of Onset    No Known Problems Mother     No Known Problems Father     Breast cancer Paternal Aunt 48     Social History     Socioeconomic History    Marital status: /Civil Union     Spouse name: None    Number of children: None    Years of education: None    Highest education level: None   Occupational History    None   Tobacco Use    Smoking status: Never    Smokeless tobacco: Never   Vaping Use    Vaping Use: Never used   Substance and Sexual Activity    Alcohol use: Never    Drug use: Never    Sexual activity: Not Currently     Partners: Male     Birth control/protection: Condom Male   Other Topics Concern    None   Social History Narrative    None     Social Determinants of Health     Financial Resource Strain: Not on file   Food Insecurity: No Food Insecurity (10/26/2023)    Hunger Vital Sign     Worried About Running Out of Food in the Last Year: Never true     Ran Out of Food in the Last Year: Never true   Transportation Needs: No Transportation Needs (10/26/2023)    PRAPARE - Transportation     Lack of Transportation (Medical): No     Lack of Transportation (Non-Medical): No   Physical Activity: Not on file   Stress: Not on file   Social Connections: Not on file   Intimate Partner Violence: Not on file   Housing Stability: Low Risk  (10/26/2023)    Housing Stability Vital Sign     Unable to Pay for Housing in the Last Year: No     Number of Places Lived in the Last Year: 1     Unstable Housing in the Last Year: No     E-Cigarette/Vaping    E-Cigarette Use Never User      E-Cigarette/Vaping Substances    Nicotine No     THC No     CBD No     Flavoring No     Other No     Unknown No          Medications:   All current active meds have been reviewed and current meds:  Scheduled Meds:  Current Facility-Administered Medications   Medication Dose Route Frequency Provider Last Rate    acetaminophen  650 mg Oral Q6H PRN Rima Putnam MD      aluminum-magnesium hydroxide-simethicone  30 mL Oral Q6H PRN Rima Putnam MD      calcitriol  0.25 mcg Oral Daily Rima Putnam MD      calcium carbonate-vitamin D  1 tablet Oral TID With Meals Rima Putnam MD      haloperidol lactate  5 mg Intramuscular HS PRN JERMAINE Jorge      levothyroxine  88 mcg Oral Early Morning Angel Yoon MD      LORazepam  2 mg Intravenous HS PRN JERMAINE Jorge      LORazepam  2 mg Intravenous Once JERMAINE Jorge      magnesium hydroxide  30 mL Oral Daily PRN Rima Putnam MD      magnesium Oxide  400 mg Oral BID Mary Ojeda MD      magnesium sulfate  4 g Intravenous Once Mary Ojeda MD      multivitamin-minerals  1 tablet Oral Daily Mary Ojeda MD      OLANZapine  5 mg Oral TID PRN Mary Ojeda MD      ondansetron  4 mg Intravenous Q6H PRN Mary Ojeda MD      QUEtiapine  50 mg Oral HS Cheyenne Law, CRNP      valproate sodium  250 mg Intravenous BID Cheyenne Law, CRNP 250 mg (10/27/23 1052)     Continuous Infusions:   PRN Meds:.  acetaminophen    aluminum-magnesium hydroxide-simethicone    haloperidol lactate    LORazepam    magnesium hydroxide    OLANZapine    ondansetron       ROS:   Review of Systems  See above    Vitals:   /95   Pulse (!) 112   Temp 98.3 °F (36.8 °C)   Resp 18   Ht 5' 5" (1.651 m)   Wt 47.9 kg (105 lb 9.6 oz)   LMP 10/09/2023   SpO2 97%   BMI 17.57 kg/m²     Physical Exam:   Physical Exam  Vitals reviewed. Constitutional:       General: She is not in acute distress. HENT:      Head: Normocephalic and atraumatic. Pulmonary:      Effort: Pulmonary effort is normal.   Skin:     General: Skin is warm and dry. Neurological:      Mental Status: She is alert. Neurologic Exam     Mental Status   Follows 1 step commands. Attention: decreased. Concentration: decreased. Limited due to lack of participation     Cranial Nerves     CN III, IV, VI   Conjugate gaze: present    CN VII   Right facial weakness: none  Left facial weakness: none    Motor Exam Observed moving extremities and ambulating independently     Gait, Coordination, and Reflexes     Tremor   Resting tremor: absent      Labs: I have personally reviewed pertinent reports.    Recent Results (from the past 24 hour(s))   Calcium, ionized    Collection Time: 10/26/23  4:09 PM   Result Value Ref Range    Calcium, Ionized 1.09 (L) 1.12 - 1.32 mmol/L   T3, free    Collection Time: 10/26/23  4:22 PM   Result Value Ref Range    T3, Free 3.21 2.50 - 3.90 pg/mL   Vitamin B12    Collection Time: 10/26/23  4:22 PM   Result Value Ref Range    Vitamin B-12 419 180 - 914 pg/mL   RPR-Syphilis Screening (Total Syphilis IGG/IGM)    Collection Time: 10/26/23  4:22 PM   Result Value Ref Range    Syphilis Total Antibody Non-reactive Non-Reactive   Folate    Collection Time: 10/26/23  4:22 PM   Result Value Ref Range    Folate 20.0 >5.9 ng/mL   Basic metabolic panel    Collection Time: 10/26/23  6:53 PM   Result Value Ref Range    Sodium 136 135 - 147 mmol/L    Potassium 3.8 3.5 - 5.3 mmol/L    Chloride 104 96 - 108 mmol/L    CO2 23 21 - 32 mmol/L    ANION GAP 9 mmol/L    BUN 11 5 - 25 mg/dL    Creatinine 0.61 0.60 - 1.30 mg/dL    Glucose 143 (H) 65 - 140 mg/dL    Calcium 8.8 8.4 - 10.2 mg/dL    eGFR 109 ml/min/1.73sq m   Magnesium    Collection Time: 10/26/23  6:53 PM   Result Value Ref Range    Magnesium 2.0 1.9 - 2.7 mg/dL   Phosphorus    Collection Time: 10/26/23  6:53 PM   Result Value Ref Range    Phosphorus 3.0 2.7 - 4.5 mg/dL       Imaging: I have personally reviewed pertinent imaging in PACS  and I have personally reviewed PACS reports. EKG, Pathology, and Other Studies: I have personally reviewed pertinent reports. Counseling / Coordination of Care  I have spent a total time of 27 minutes on 10/27/23 in caring for this patient including Patient and family education, Impressions, Documenting in the medical record, Obtaining or reviewing history  , and Communicating with other healthcare professionals . Assessment, images, an plan reviewed with Dr. Delroy Nye.  Plan discussed with patient, family at bedside and primary service

## 2023-10-27 NOTE — ASSESSMENT & PLAN NOTE
Patient is s/p total thyroidectomy for medullary cancer of the thyroid on 10/11  Endocrinology consulted  Synthroid increased to 88 mcg  Reordered 24-hour urine collection to include 5-HIAA, catecholamines, metanephrines, dopamine, VMA etc.  R/o hypercortisolism but could also be psuedocushing's.

## 2023-10-27 NOTE — NURSING NOTE
Pt slept shortly before 2230 and woke up at 0650. Pt is in a good mood and took her synthroid without persuasion.

## 2023-10-27 NOTE — PLAN OF CARE
Problem: Potential for Falls  Goal: Patient will remain free of falls  Description: INTERVENTIONS:  - Educate patient/family on patient safety including physical limitations  - Instruct patient to call for assistance with activity   - Consult OT/PT to assist with strengthening/mobility   - Keep Call bell within reach  - Keep bed low and locked with side rails adjusted as appropriate  - Keep care items and personal belongings within reach  - Initiate and maintain comfort rounds  - Make Fall Risk Sign visible to staff  - Offer Toileting every  Hours, in advance of need  - Initiate/Maintain alarm  - Obtain necessary fall risk management equipment:   - Apply yellow socks and bracelet for high fall risk patients  - Consider moving patient to room near nurses station  Outcome: Progressing     Problem: MOBILITY - ADULT  Goal: Maintain or return to baseline ADL function  Description: INTERVENTIONS:  - Educate patient/family on patient safety including physical limitations  - Instruct patient to call for assistance with activity   - Consult OT/PT to assist with strengthening/mobility   - Keep Call bell within reach  - Keep bed low and locked with side rails adjusted as appropriate  - Keep care items and personal belongings within reach  - Initiate and maintain comfort rounds  - Make Fall Risk Sign visible to staff  - Offer Toileting every  Hours, in advance of need  - Initiate/Maintain alarm  - Obtain necessary fall risk management equipment:   - Apply yellow socks and bracelet for high fall risk patients  - Consider moving patient to room near nurses station  Outcome: Progressing  Goal: Maintains/Returns to pre admission functional level  Description: INTERVENTIONS:  -  Assess patient's ability to carry out ADLs; assess patient's baseline for ADL function and identify physical deficits which impact ability to perform ADLs (bathing, care of mouth/teeth, toileting, grooming, dressing, etc.)  - Assess/evaluate cause of self-care deficits   - Assess range of motion  - Assess patient's mobility; develop plan if impaired  - Assess patient's need for assistive devices and provide as appropriate  - Encourage maximum independence but intervene and supervise when necessary  - Involve family in performance of ADLs  - Assess for home care needs following discharge   - Consider OT consult to assist with ADL evaluation and planning for discharge  - Provide patient education as appropriate  Outcome: Progressing     Problem: PAIN - ADULT  Goal: Verbalizes/displays adequate comfort level or baseline comfort level  Description: Interventions:  - Encourage patient to monitor pain and request assistance  - Assess pain using appropriate pain scale  - Administer analgesics based on type and severity of pain and evaluate response  - Implement non-pharmacological measures as appropriate and evaluate response  - Consider cultural and social influences on pain and pain management  - Notify physician/advanced practitioner if interventions unsuccessful or patient reports new pain  Outcome: Progressing     Problem: INFECTION - ADULT  Goal: Absence or prevention of progression during hospitalization  Description: INTERVENTIONS:  - Assess and monitor for signs and symptoms of infection  - Monitor lab/diagnostic results  - Monitor all insertion sites, i.e. indwelling lines, tubes, and drains  - Monitor endotracheal if appropriate and nasal secretions for changes in amount and color  - [place] appropriate cooling/warming therapies per order  - Administer medications as ordered  - Instruct and encourage patient and family to use good hand hygiene technique  - Identify and instruct in appropriate isolation precautions for identified infection/condition  Outcome: Progressing     Problem: SAFETY ADULT  Goal: Patient will remain free of falls  Description: INTERVENTIONS:  - Educate patient/family on patient safety including physical limitations  - Instruct patient to call for assistance with activity   - Consult OT/PT to assist with strengthening/mobility   - Keep Call bell within reach  - Keep bed low and locked with side rails adjusted as appropriate  - Keep care items and personal belongings within reach  - Initiate and maintain comfort rounds  - Make Fall Risk Sign visible to staff  - Offer Toileting every  Hours, in advance of need  - Initiate/Maintain alarm  - Obtain necessary fall risk management equipment:   - Apply yellow socks and bracelet for high fall risk patients  - Consider moving patient to room near nurses station  Outcome: Progressing  Goal: Maintain or return to baseline ADL function  Description: INTERVENTIONS:  - Educate patient/family on patient safety including physical limitations  - Instruct patient to call for assistance with activity   - Consult OT/PT to assist with strengthening/mobility   - Keep Call bell within reach  - Keep bed low and locked with side rails adjusted as appropriate  - Keep care items and personal belongings within reach  - Initiate and maintain comfort rounds  - Make Fall Risk Sign visible to staff  - Offer Toileting every  Hours, in advance of need  - Initiate/Maintain alarm  - Obtain necessary fall risk management equipment:   - Apply yellow socks and bracelet for high fall risk patients  - Consider moving patient to room near nurses station  Outcome: Progressing  Goal: Maintains/Returns to pre admission functional level  Description: INTERVENTIONS:  -  Assess patient's ability to carry out ADLs; assess patient's baseline for ADL function and identify physical deficits which impact ability to perform ADLs (bathing, care of mouth/teeth, toileting, grooming, dressing, etc.)  - Assess/evaluate cause of self-care deficits   - Assess range of motion  - Assess patient's mobility; develop plan if impaired  - Assess patient's need for assistive devices and provide as appropriate  - Encourage maximum independence but intervene and supervise when necessary  - Involve family in performance of ADLs  - Assess for home care needs following discharge   - Consider OT consult to assist with ADL evaluation and planning for discharge  - Provide patient education as appropriate  Outcome: Progressing     Problem: DISCHARGE PLANNING  Goal: Discharge to home or other facility with appropriate resources  Description: INTERVENTIONS:  - Identify barriers to discharge w/patient and caregiver  - Arrange for needed discharge resources and transportation as appropriate  - Identify discharge learning needs (meds, wound care, etc.)  - Arrange for interpretive services to assist at discharge as needed  - Refer to Case Management Department for coordinating discharge planning if the patient needs post-hospital services based on physician/advanced practitioner order or complex needs related to functional status, cognitive ability, or social support system  Outcome: Progressing     Problem: Knowledge Deficit  Goal: Patient/family/caregiver demonstrates understanding of disease process, treatment plan, medications, and discharge instructions  Description: Complete learning assessment and assess knowledge base. Interventions:  - Provide teaching at level of understanding  - Provide teaching via preferred learning methods  Outcome: Progressing     Problem: Nutrition/Hydration-ADULT  Goal: Nutrient/Hydration intake appropriate for improving, restoring or maintaining nutritional needs  Description: Monitor and assess patient's nutrition/hydration status for malnutrition. Collaborate with interdisciplinary team and initiate plan and interventions as ordered. Monitor patient's weight and dietary intake as ordered or per policy. Utilize nutrition screening tool and intervene as necessary. Determine patient's food preferences and provide high-protein, high-caloric foods as appropriate.      INTERVENTIONS:  - Monitor oral intake, urinary output, labs, and treatment plans  - Assess nutrition and hydration status and recommend course of action  - Evaluate amount of meals eaten  - Assist patient with eating if necessary   - Allow adequate time for meals  - Recommend/ encourage appropriate diets, oral nutritional supplements, and vitamin/mineral supplements  - Order, calculate, and assess calorie counts as needed  - Recommend, monitor, and adjust tube feedings and TPN/PPN based on assessed needs  - Assess need for intravenous fluids  - Provide specific nutrition/hydration education as appropriate  - Include patient/family/caregiver in decisions related to nutrition  Outcome: Progressing

## 2023-10-27 NOTE — ASSESSMENT & PLAN NOTE
Patient presented to the ED with complaints of change in mental status over the last 48 hours prior to admission. She is s/p thyroidectomy on 10/11 in setting of medullary thyroid carcinoma. Is increasingly agitated, and confused have periods of excessive energy where patient is walking about, and dancing and then has a sudden decrease where she nearly collapses to the bed with a couch and does not respond to questions  Endocrine consulted  Synthroid increased to 88 mcg  Reordered 24-hour urine collection to include 5-HIAA, catecholamines, metanephrines, dopamine, VMA etc.  R/o hypercortisolism but could also be psuedocushing's.   Psych evaluated on 10/25   recommending starting Lamictal however at that time patient was unsure about starting that medication  Reconsulted psych on 10/26 as patient has become acutely more delirious with more bizarre behaviors periods of unresponsiveness with blank staring   Decided patient was having acute delirium as she had not slept in several days occasions were ordered and adjusted  Commending good sleep hygiene  Depacon until patient is compliant with oral medications  Nightly Seroquel  As needed Ativan and Haldol and Zyprexa  Neurology consulted  MRI negative for acute findings  B1, B12, folate, thiamine, PCR  Continue good sleep hygiene, frequent reorientation

## 2023-10-27 NOTE — PROGRESS NOTES
Urine pregnancy test done on floor with nursing prior to MRI at 1400. Test resulted Negative. Patient taken to MRI at this time.

## 2023-10-27 NOTE — DISCHARGE SUMMARY
Discharge Summary - Head and Neck Surgery   Shantal Guadarrama 56 y.o. female MRN: [id number]  Unit/Bed#: S -65 Encounter: [id number]    Admission Date:   Admission Orders (From admission, onward)       Ordered        10/13/23 0809  Inpatient Admission  Once                             Discharge Date: 10/13/23    Admitting Diagnosis: Medullary thyroid carcinoma ([address]) Ottoniel Mode    Discharge Diagnosis: medullary thyroid carcinoma    Medical Problems       Resolved Problems  Date Reviewed: 10/25/2023   None         Attending: David Perla    Consulting Physician(s): none    Procedures Performed: No orders of the defined types were placed in this encounter. Pathology:   Final Diagnosis   A. 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 .  - Tumor is diffusely positive with synaptophysin, chromogranin, TTF1. PAX8 appears weakly positive. - Amyloid is highlighted with [place] Red stain.  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 - Lymphocytic thyroiditis. - One benign parathyroid, 5 mm. - Two lymph nodes, negative for carcinoma (0/2). - See synoptic report. B. Lymph Node, Neck - central compartment:  - Eleven lymph nodes, negative for carcinoma (0/11)        Hospital Course: Patient underwent total thyroidectomy and central compartment neck dissection on 10/11. She was evaluated over the next two days. The morning of 10/13 she was deemed adequate for discharge. Condition at Discharge: good     Discharge instructions/Information to patient and family:   See after visit summary for information provided to patient and family. Provisions for Follow-Up Care:  See after visit summary for information related to follow-up care and any pertinent home health orders.       Disposition: Home          Planned Readmission: No    Discharge Statement   I spent 30 minutes discharging the patient. This time was spent on the day of discharge. I had direct contact with the patient on the day of discharge. Additional documentation is required if more than 30 minutes were spent on discharge. Discharge Medications:  See after visit summary for reconciled discharge medications provided to patient and family.

## 2023-10-27 NOTE — PLAN OF CARE
Problem: Potential for Falls  Goal: Patient will remain free of falls  Description: INTERVENTIONS:  - Educate patient/family on patient safety including physical limitations  - Instruct patient to call for assistance with activity   - Consult OT/PT to assist with strengthening/mobility   - Keep Call bell within reach  - Keep bed low and locked with side rails adjusted as appropriate  - Keep care items and personal belongings within reach  - Initiate and maintain comfort rounds  - Make Fall Risk Sign visible to staff  - Offer Toileting every  Hours, in advance of need  - Initiate/Maintain alarm  - Obtain necessary fall risk management equipment:   - Apply yellow socks and bracelet for high fall risk patients  - Consider moving patient to room near nurses station  Outcome: Progressing     Problem: MOBILITY - ADULT  Goal: Maintain or return to baseline ADL function  Description: INTERVENTIONS:  -  Assess patient's ability to carry out ADLs; assess patient's baseline for ADL function and identify physical deficits which impact ability to perform ADLs (bathing, care of mouth/teeth, toileting, grooming, dressing, etc.)  - Assess/evaluate cause of self-care deficits   - Assess range of motion  - Assess patient's mobility; develop plan if impaired  - Assess patient's need for assistive devices and provide as appropriate  - Encourage maximum independence but intervene and supervise when necessary  - Involve family in performance of ADLs  - Assess for home care needs following discharge   - Consider OT consult to assist with ADL evaluation and planning for discharge  - Provide patient education as appropriate  Outcome: Progressing  Goal: Maintains/Returns to pre admission functional level  Description: INTERVENTIONS:  - Perform BMAT or MOVE assessment daily.   - Set and communicate daily mobility goal to care team and patient/family/caregiver.    - Collaborate with rehabilitation services on mobility goals if consulted  - Perform Range of Motion  times a day. - Reposition patient every  hours.   - Dangle patient times a day  - Stand patient  times a day  - Ambulate patient  times a day  - Out of bed to chair  times a day   - Out of bed for meals  times a day  - Out of bed for toileting  - Record patient progress and toleration of activity level   Outcome: Progressing     Problem: PAIN - ADULT  Goal: Verbalizes/displays adequate comfort level or baseline comfort level  Description: Interventions:  - Encourage patient to monitor pain and request assistance  - Assess pain using appropriate pain scale  - Administer analgesics based on type and severity of pain and evaluate response  - Implement non-pharmacological measures as appropriate and evaluate response  - Consider cultural and social influences on pain and pain management  - Notify physician/advanced practitioner if interventions unsuccessful or patient reports new pain  Outcome: Progressing     Problem: INFECTION - ADULT  Goal: Absence or prevention of progression during hospitalization  Description: INTERVENTIONS:  - Assess and monitor for signs and symptoms of infection  - Monitor lab/diagnostic results  - Monitor all insertion sites, i.e. indwelling lines, tubes, and drains  - Monitor endotracheal if appropriate and nasal secretions for changes in amount and color  - [place] appropriate cooling/warming therapies per order  - Administer medications as ordered  - Instruct and encourage patient and family to use good hand hygiene technique  - Identify and instruct in appropriate isolation precautions for identified infection/condition  Outcome: Progressing     Problem: SAFETY ADULT  Goal: Patient will remain free of falls  Description: INTERVENTIONS:  - Educate patient/family on patient safety including physical limitations  - Instruct patient to call for assistance with activity   - Consult OT/PT to assist with strengthening/mobility   - Keep Call bell within reach  - Keep bed low and locked with side rails adjusted as appropriate  - Keep care items and personal belongings within reach  - Initiate and maintain comfort rounds  - Make Fall Risk Sign visible to staff  - Offer Toileting ever Hours, in advance of need  - Initiate/Maintain \alarm  - Obtain necessary fall risk management equipment: \  - Apply yellow socks and bracelet for high fall risk patients  - Consider moving patient to room near nurses station  Outcome: Progressing  Goal: Maintain or return to baseline ADL function  Description: INTERVENTIONS:  -  Assess patient's ability to carry out ADLs; assess patient's baseline for ADL function and identify physical deficits which impact ability to perform ADLs (bathing, care of mouth/teeth, toileting, grooming, dressing, etc.)  - Assess/evaluate cause of self-care deficits   - Assess range of motion  - Assess patient's mobility; develop plan if impaired  - Assess patient's need for assistive devices and provide as appropriate  - Encourage maximum independence but intervene and supervise when necessary  - Involve family in performance of ADLs  - Assess for home care needs following discharge   - Consider OT consult to assist with ADL evaluation and planning for discharge  - Provide patient education as appropriate  Outcome: Progressing  Goal: Maintains/Returns to pre admission functional level  Description: INTERVENTIONS:  - Perform BMAT or MOVE assessment daily.   - Set and communicate daily mobility goal to care team and patient/family/caregiver. - Collaborate with rehabilitation services on mobility goals if consulted  - Perform Range of Motion times a day. - Reposition patient every  hours.   - Dangle patient times a day  - Stand patient  times a day  - Ambulate patient times a day  - Out of bed to chair  times a day   - Out of bed for meals  times a day  - Out of bed for toileting  - Record patient progress and toleration of activity level   Outcome: Progressing     Problem: DISCHARGE PLANNING  Goal: Discharge to home or other facility with appropriate resources  Description: INTERVENTIONS:  - Identify barriers to discharge w/patient and caregiver  - Arrange for needed discharge resources and transportation as appropriate  - Identify discharge learning needs (meds, wound care, etc.)  - Arrange for interpretive services to assist at discharge as needed  - Refer to Case Management Department for coordinating discharge planning if the patient needs post-hospital services based on physician/advanced practitioner order or complex needs related to functional status, cognitive ability, or social support system  Outcome: Progressing     Problem: Knowledge Deficit  Goal: Patient/family/caregiver demonstrates understanding of disease process, treatment plan, medications, and discharge instructions  Description: Complete learning assessment and assess knowledge base. Interventions:  - Provide teaching at level of understanding  - Provide teaching via preferred learning methods  Outcome: Progressing     Problem: Nutrition/Hydration-ADULT  Goal: Nutrient/Hydration intake appropriate for improving, restoring or maintaining nutritional needs  Description: Monitor and assess patient's nutrition/hydration status for malnutrition. Collaborate with interdisciplinary team and initiate plan and interventions as ordered. Monitor patient's weight and dietary intake as ordered or per policy. Utilize nutrition screening tool and intervene as necessary. Determine patient's food preferences and provide high-protein, high-caloric foods as appropriate.      INTERVENTIONS:  - Monitor oral intake, urinary output, labs, and treatment plans  - Assess nutrition and hydration status and recommend course of action  - Evaluate amount of meals eaten  - Assist patient with eating if necessary   - Allow adequate time for meals  - Recommend/ encourage appropriate diets, oral nutritional supplements, and vitamin/mineral supplements  - Order, calculate, and assess calorie counts as needed  - Recommend, monitor, and adjust tube feedings and TPN/PPN based on assessed needs  - Assess need for intravenous fluids  - Provide specific nutrition/hydration education as appropriate  - Include patient/family/caregiver in decisions related to nutrition  Outcome: Progressing

## 2023-10-27 NOTE — PROGRESS NOTES
[address]  Progress Note  Name: Pernell Crespo  MRN: [id number]  Unit/Bed#: -01 I Date of Admission: 10/24/2023   Date of Service: 10/27/2023 I Hospital Day: 3    Assessment/Plan   * Acute metabolic encephalopathy  Assessment & Plan  Patient presented to the ED with complaints of change in mental status over the last 48 hours prior to admission. She is s/p thyroidectomy on 10/11 in setting of medullary thyroid carcinoma. Is increasingly agitated, and confused have periods of excessive energy where patient is walking about, and dancing and then has a sudden decrease where she nearly collapses to the bed with a couch and does not respond to questions  Endocrine consulted  Synthroid increased to 88 mcg  Reordered 24-hour urine collection to include 5-HIAA, catecholamines, metanephrines, dopamine, VMA etc.  R/o hypercortisolism but could also be psuedocushing's.   Psych evaluated on 10/25   recommending starting Lamictal however at that time patient was unsure about starting that medication  Reconsulted psych on 10/26 as patient has become acutely more delirious with more bizarre behaviors periods of unresponsiveness with blank staring   Decided patient was having acute delirium as she had not slept in several days occasions were ordered and adjusted  Commending good sleep hygiene  Depacon until patient is compliant with oral medications  Nightly Seroquel  As needed Ativan and Haldol and Zyprexa  Neurology consulted  MRI negative for acute findings  B1, B12, folate, thiamine, PCR  Continue good sleep hygiene, frequent reorientation    Hypomagnesemia  Assessment & Plan  Present on admission, evidenced by a magnesium level of 1.6  Repleted with 4 g  Corrected to 2.1  Resolved with supplementation    Hypothyroidism  Assessment & Plan  Endocrinology consulted  Noted to have a TSH of 10, Ionized calcium level of 1.11  TSH elevated; increase levothyroxine to 88 mcg    Medullary thyroid carcinoma [facility])  Assessment & Plan  Patient is s/p total thyroidectomy for medullary cancer of the thyroid on 10/11  Endocrinology consulted  Synthroid increased to 88 mcg  Reordered 24-hour urine collection to include 5-HIAA, catecholamines, metanephrines, dopamine, VMA etc.  R/o hypercortisolism but could also be psuedocushing's. VTE Pharmacologic Prophylaxis:   Low Risk (Score 0-2) - Encourage Ambulation. Patient Centered Rounds: I performed bedside rounds with nursing staff today. Discussions with Specialists or Other Care Team Provider: Rosaura with neurology    Education and Discussions with Family / Patient: Discussed at length with patient's , son, mother    Total Time Spent on Date of Encounter in care of patient: 55 mins. This time was spent on one or more of the following: performing physical exam; counseling and coordination of care; obtaining or reviewing history; documenting in the medical record; reviewing/ordering tests, medications or procedures; communicating with other healthcare professionals and discussing with patient's family/caregivers. Current Length of Stay: 3 day(s)  Current Patient Status: Inpatient   Certification Statement: The patient will continue to require additional inpatient hospital stay due to continued work-up of patient's acute delirium and altered mental status  Discharge Plan: Anticipate discharge in 48-72 hrs to home. Code Status: Level 1 - Full Code    Subjective:   Patient was better this morning she kept stating that she wanted to go home however her behavior was brief as are in the sense that she kept stating that she will be going home at 1111 nobody can really figure out where she came up with this idea of 1111 but she is visibly agitated easily irritated with questioning wants to be left alone continues to state that she is fine and that she wants to go home throughout the day her mentation continued to worsen until she was back in a.   Of trying to get out of bed and then lying flat staring blankly at the ceiling not redirectable unable to follow commands    Objective:     Vitals:   No data recorded. BP: (150)/(95) 150/95  Body mass index is 17.57 kg/m². Input and Output Summary (last 24 hours):   No intake or output data in the 24 hours ending 10/27/23 1601    Physical Exam:   Physical Exam  Vitals reviewed. Cardiovascular:      Rate and Rhythm: Normal rate. Pulmonary:      Effort: Pulmonary effort is normal.   Abdominal:      Palpations: Abdomen is soft. Musculoskeletal:         General: Normal range of motion. Skin:     General: Skin is warm. Neurological:      Mental Status: She is alert. She is disoriented. Psychiatric:         Attention and Perception: She is inattentive. Mood and Affect: Mood is anxious. Affect is labile and flat. Behavior: Behavior is agitated. Cognition and Memory: Cognition is impaired. Memory is impaired. Judgment: Judgment is impulsive and inappropriate.         Additional Data:     Labs:  Results from last 7 days   Lab Units 10/25/23  0839   WBC Thousand/uL 8.13   HEMOGLOBIN g/dL 12.5   HEMATOCRIT % 37.6   PLATELETS Thousands/uL 407*   NEUTROS PCT % 69   LYMPHS PCT % 26   MONOS PCT % 4   EOS PCT % 0     Results from last 7 days   Lab Units 10/26/23  1853 10/25/23  0839   SODIUM mmol/L 136 139   POTASSIUM mmol/L 3.8 3.8   CHLORIDE mmol/L 104 106   CO2 mmol/L 23 26   BUN mg/dL 11 10   CREATININE mg/dL 0.61 0.59*   ANION GAP mmol/L 9 7   CALCIUM mg/dL 8.8 8.8   ALBUMIN g/dL  --  4.1   TOTAL BILIRUBIN mg/dL  --  0.48   ALK PHOS U/L  --  46   ALT U/L  --  13   AST U/L  --  16   GLUCOSE RANDOM mg/dL 143* 102         Results from last 7 days   Lab Units 10/24/23  1301   POC GLUCOSE mg/dl 124               Lines/Drains:  Invasive Devices       Peripheral Intravenous Line  Duration             Peripheral IV 10/26/23 Left;Ventral (anterior) Forearm 1 day                      Recent Cultures (last 7 days):         Last 24 Hours Medication List:   Current Facility-Administered Medications   Medication Dose Route Frequency Provider Last Rate    acetaminophen  650 mg Oral Q6H PRN Mitzy Mcintosh MD      aluminum-magnesium hydroxide-simethicone  30 mL Oral Q6H PRN Mitzy Mcintosh MD      calcitriol  0.25 mcg Oral Daily Mitzy Mcintosh MD      calcium carbonate-vitamin D  1 tablet Oral TID With Meals Mitzy Mcintosh MD      haloperidol lactate  5 mg Intramuscular HS PRN JERMAINE Lugo      levothyroxine  88 mcg Oral Early Morning Alisha Stands, MD      LORazepam  2 mg Intravenous HS PRN JERMAINE Lugo      LORazepam  2 mg Intravenous Once JERMAINE Hernández      magnesium hydroxide  30 mL Oral Daily PRN Mitzy Mcintosh MD      magnesium Oxide  400 mg Oral BID Mitzy Mcintosh MD      magnesium sulfate  4 g Intravenous Once Mitzy Mcintosh MD      multivitamin-minerals  1 tablet Oral Daily Mitzy Mcintosh MD      OLANZapine  5 mg Oral TID PRN Mitzy Mcintosh MD      ondansetron  4 mg Intravenous Q6H PRN Mitzy Mcintosh MD      QUEtiapine  50 mg Oral HS JERMAINE Hernández      valproate sodium  250 mg Intravenous BID JERMAINE Lugo 250 mg (10/27/23 1052)        Today, Patient Was Seen By: JERMAINE Lugo    **Please Note: This note may have been constructed using a voice recognition system. **

## 2023-10-27 NOTE — ASSESSMENT & PLAN NOTE
Present on admission, evidenced by a magnesium level of 1.6  Repleted with 4 g  Corrected to 2.1  Resolved with supplementation

## 2023-10-28 VITALS
RESPIRATION RATE: 18 BRPM | WEIGHT: 105.6 LBS | HEART RATE: 74 BPM | TEMPERATURE: 98.3 F | BODY MASS INDEX: 17.59 KG/M2 | SYSTOLIC BLOOD PRESSURE: 153 MMHG | DIASTOLIC BLOOD PRESSURE: 96 MMHG | HEIGHT: 65 IN | OXYGEN SATURATION: 96 %

## 2023-10-28 PROBLEM — R41.0 ACUTE DELIRIUM: Status: ACTIVE | Noted: 2023-10-24

## 2023-10-28 LAB
GLUCOSE SERPL-MCNC: 86 MG/DL (ref 65–140)
THYROGLOB AB SERPL-ACNC: 118.3 IU/ML (ref 0–0.9)
THYROPEROXIDASE AB SERPL-ACNC: 17 IU/ML (ref 0–34)

## 2023-10-28 PROCEDURE — 99232 SBSQ HOSP IP/OBS MODERATE 35: CPT | Performed by: PSYCHIATRY & NEUROLOGY

## 2023-10-28 PROCEDURE — 82948 REAGENT STRIP/BLOOD GLUCOSE: CPT

## 2023-10-28 PROCEDURE — 99239 HOSP IP/OBS DSCHRG MGMT >30: CPT | Performed by: NURSE PRACTITIONER

## 2023-10-28 RX ORDER — QUETIAPINE FUMARATE 50 MG/1
50 TABLET, FILM COATED ORAL
Qty: 30 TABLET | Refills: 0 | Status: SHIPPED | OUTPATIENT
Start: 2023-10-28

## 2023-10-28 RX ORDER — LAMOTRIGINE 25 MG/1
25 TABLET ORAL DAILY
Status: DISCONTINUED | OUTPATIENT
Start: 2023-10-28 | End: 2023-10-28 | Stop reason: HOSPADM

## 2023-10-28 RX ORDER — LEVOTHYROXINE SODIUM 88 UG/1
88 TABLET ORAL
Qty: 30 TABLET | Refills: 0 | Status: SHIPPED | OUTPATIENT
Start: 2023-10-29 | End: 2023-11-28

## 2023-10-28 RX ORDER — LAMOTRIGINE 25 MG/1
25 TABLET ORAL 2 TIMES DAILY
Qty: 60 TABLET | Refills: 0 | Status: SHIPPED | OUTPATIENT
Start: 2023-10-28 | End: 2023-11-27

## 2023-10-28 RX ADMIN — Medication 1 TABLET: at 08:31

## 2023-10-28 RX ADMIN — Medication 400 MG: at 08:31

## 2023-10-28 RX ADMIN — LAMOTRIGINE 25 MG: 25 TABLET ORAL at 11:03

## 2023-10-28 RX ADMIN — LEVOTHYROXINE SODIUM 88 MCG: 88 TABLET ORAL at 08:31

## 2023-10-28 NOTE — PLAN OF CARE
Problem: Potential for Falls  Goal: Patient will remain free of falls  Description: INTERVENTIONS:  - Educate patient/family on patient safety including physical limitations  - Instruct patient to call for assistance with activity   - Consult OT/PT to assist with strengthening/mobility   - Keep Call bell within reach  - Keep bed low and locked with side rails adjusted as appropriate  - Keep care items and personal belongings within reach  - Initiate and maintain comfort rounds  - Make Fall Risk Sign visible to staff  - Offer Toileting every Hours, in advance of need  - Initiate/Maintain alarm  - Obtain necessary fall risk management equipment:   - Apply yellow socks and bracelet for high fall risk patients  - Consider moving patient to room near nurses station  Outcome: Progressing     Problem: MOBILITY - ADULT  Goal: Maintain or return to baseline ADL function  Description: INTERVENTIONS:  -  Assess patient's ability to carry out ADLs; assess patient's baseline for ADL function and identify physical deficits which impact ability to perform ADLs (bathing, care of mouth/teeth, toileting, grooming, dressing, etc.)  - Assess/evaluate cause of self-care deficits   - Assess range of motion  - Assess patient's mobility; develop plan if impaired  - Assess patient's need for assistive devices and provide as appropriate  - Encourage maximum independence but intervene and supervise when necessary  - Involve family in performance of ADLs  - Assess for home care needs following discharge   - Consider OT consult to assist with ADL evaluation and planning for discharge  - Provide patient education as appropriate  Outcome: Progressing  Goal: Maintains/Returns to pre admission functional level  Description: INTERVENTIONS:  - Perform BMAT or MOVE assessment daily.   - Set and communicate daily mobility goal to care team and patient/family/caregiver.    - Collaborate with rehabilitation services on mobility goals if consulted  - Perform Range of Motion  times a day. - Reposition patient every hours.   - Dangle patient  times a day  - Stand patient times a day  - Ambulate patient times a day  - Out of bed to chair  times a day   - Out of bed for meals times a day  - Out of bed for toileting  - Record patient progress and toleration of activity level   Outcome: Progressing     Problem: PAIN - ADULT  Goal: Verbalizes/displays adequate comfort level or baseline comfort level  Description: Interventions:  - Encourage patient to monitor pain and request assistance  - Assess pain using appropriate pain scale  - Administer analgesics based on type and severity of pain and evaluate response  - Implement non-pharmacological measures as appropriate and evaluate response  - Consider cultural and social influences on pain and pain management  - Notify physician/advanced practitioner if interventions unsuccessful or patient reports new pain  Outcome: Progressing     Problem: INFECTION - ADULT  Goal: Absence or prevention of progression during hospitalization  Description: INTERVENTIONS:  - Assess and monitor for signs and symptoms of infection  - Monitor lab/diagnostic results  - Monitor all insertion sites, i.e. indwelling lines, tubes, and drains  - Monitor endotracheal if appropriate and nasal secretions for changes in amount and color  - [place] appropriate cooling/warming therapies per order  - Administer medications as ordered  - Instruct and encourage patient and family to use good hand hygiene technique  - Identify and instruct in appropriate isolation precautions for identified infection/condition  Outcome: Progressing     Problem: SAFETY ADULT  Goal: Patient will remain free of falls  Description: INTERVENTIONS:  - Educate patient/family on patient safety including physical limitations  - Instruct patient to call for assistance with activity   - Consult OT/PT to assist with strengthening/mobility   - Keep Call bell within reach  - Keep bed low and locked with side rails adjusted as appropriate  - Keep care items and personal belongings within reach  - Initiate and maintain comfort rounds  - Make Fall Risk Sign visible to staff  - Offer Toileting every  Hours, in advance of need  - Initiate/Maintain alarm  - Obtain necessary fall risk management equipment:  - Apply yellow socks and bracelet for high fall risk patients  - Consider moving patient to room near nurses station  Outcome: Progressing  Goal: Maintain or return to baseline ADL function  Description: INTERVENTIONS:  -  Assess patient's ability to carry out ADLs; assess patient's baseline for ADL function and identify physical deficits which impact ability to perform ADLs (bathing, care of mouth/teeth, toileting, grooming, dressing, etc.)  - Assess/evaluate cause of self-care deficits   - Assess range of motion  - Assess patient's mobility; develop plan if impaired  - Assess patient's need for assistive devices and provide as appropriate  - Encourage maximum independence but intervene and supervise when necessary  - Involve family in performance of ADLs  - Assess for home care needs following discharge   - Consider OT consult to assist with ADL evaluation and planning for discharge  - Provide patient education as appropriate  Outcome: Progressing  Goal: Maintains/Returns to pre admission functional level  Description: INTERVENTIONS:  - Perform BMAT or MOVE assessment daily.   - Set and communicate daily mobility goal to care team and patient/family/caregiver. - Collaborate with rehabilitation services on mobility goals if consulted  - Perform Range of Motion  times a day. - Reposition patient every  hours.   - Dangle patient times a day  - Stand patient  times a day  - Ambulate patient  times a day  - Out of bed to chair  times a day   - Out of bed for meals  times a day  - Out of bed for toileting  - Record patient progress and toleration of activity level   Outcome: Progressing     Problem: DISCHARGE PLANNING  Goal: Discharge to home or other facility with appropriate resources  Description: INTERVENTIONS:  - Identify barriers to discharge w/patient and caregiver  - Arrange for needed discharge resources and transportation as appropriate  - Identify discharge learning needs (meds, wound care, etc.)  - Arrange for interpretive services to assist at discharge as needed  - Refer to Case Management Department for coordinating discharge planning if the patient needs post-hospital services based on physician/advanced practitioner order or complex needs related to functional status, cognitive ability, or social support system  Outcome: Progressing     Problem: Knowledge Deficit  Goal: Patient/family/caregiver demonstrates understanding of disease process, treatment plan, medications, and discharge instructions  Description: Complete learning assessment and assess knowledge base. Interventions:  - Provide teaching at level of understanding  - Provide teaching via preferred learning methods  Outcome: Progressing     Problem: Nutrition/Hydration-ADULT  Goal: Nutrient/Hydration intake appropriate for improving, restoring or maintaining nutritional needs  Description: Monitor and assess patient's nutrition/hydration status for malnutrition. Collaborate with interdisciplinary team and initiate plan and interventions as ordered. Monitor patient's weight and dietary intake as ordered or per policy. Utilize nutrition screening tool and intervene as necessary. Determine patient's food preferences and provide high-protein, high-caloric foods as appropriate.      INTERVENTIONS:  - Monitor oral intake, urinary output, labs, and treatment plans  - Assess nutrition and hydration status and recommend course of action  - Evaluate amount of meals eaten  - Assist patient with eating if necessary   - Allow adequate time for meals  - Recommend/ encourage appropriate diets, oral nutritional supplements, and vitamin/mineral supplements  - Order, calculate, and assess calorie counts as needed  - Recommend, monitor, and adjust tube feedings and TPN/PPN based on assessed needs  - Assess need for intravenous fluids  - Provide specific nutrition/hydration education as appropriate  - Include patient/family/caregiver in decisions related to nutrition  Outcome: Progressing

## 2023-10-28 NOTE — ASSESSMENT & PLAN NOTE
Patient presented to the ED with complaints of change in mental status over the last 48 hours prior to admission. She is s/p thyroidectomy on 10/11 in setting of medullary thyroid carcinoma.     Is increasingly agitated, and confused have periods of excessive energy where patient is walking about, and dancing and then has a sudden decrease where she nearly collapses to the bed with a couch and does not respond to questions  Endocrine consulted  Synthroid increased to 88 mcg  Follow up outpatient  Psych evaluated on 10/25   Will discharge with Lamictal and close out patient follow up  Encourage good sleep hygiene  Reconsulted psych on 10/26 as patient has become acutely more delirious with more bizarre behaviors periods of unresponsiveness with blank staring   Decided patient was having acute delirium as she had not slept in several days occasions were ordered and adjusted  Commending good sleep hygiene  Sending with PRN Seroquel   Neurology consulted  MRI negative for acute findings  B1, B12, folate, thiamine, PCR- follow up  labs at PCP  EEG negative  Continue good sleep hygiene, frequent reorientation

## 2023-10-28 NOTE — PROGRESS NOTES
Progress Note - Neurology   Donel Leader 56 y.o. female [id number]  Unit/Bed#: /-01    Assessment:  * Acute delirium  Assessment & Plan  56 y.o.  female with medullary thyroid carcinoma s/p recent total thyroidectomy with lymph node dissection (10/11/2023) who presents to [place] ED on 10/24/2023 with worsening AMS x 2 days and no sleep for 2-3 nights. She was progressively confused, had bizarre repetitive behaviors, periods of being minimally responsive with occasional eyelid fluttering, with only brief, partial response to multiple IM doses of neuroleptics in the ED and on the floor. Please refer to HPI for additional details. Work-up:  -Outpatient lab work prior to admission (10/23): Elevated TSH 8.66, free T4 WNL, PTH WNL  -Labs on presentation:  Mild leukocytosis, 10.21. Afebrile  Thrombocytosis, 464  Magnesium low, 1.69 ionized calcium low, 1.11->1.09  TSH elevated, 10.665  UA: Negative nitrite, 1-2 WBC, occasional bacteria  Labs WNL: Hepatic function panel, troponin, ethanol level, acetaminophen level, salicylate level, RDU, T3, b12 (419), RPR, folate, Mg. 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 No abnormal parenchymal leptomeningeal enhancement identified. Minimal nonspecific white matter FLAIR signal hyperintensity. 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 EEG (10/27):   Normal    Acute encephalopathy, unclear etiology; suspected delirium in the setting of sleep deprivation vs functional/primary psychiatric etiology given negative workup and improvement with psychiatric treatments (depakote recommended by psychiatry as a mood stabilizer), although CSF was not analyzed. In this context, unclear what to make of the elevated thyroglobulin Ab (118.3) that returned on 10/28. She started improving after taking 50mg seroquel the night of 10/26, after which she slept well (6-8 hours), for the first time in several days. As of 10/28, she is much improved, with her family reporting her being 95% back to normal, however, she still has difficulty following commands, and seems to have limited understanding of the situation. On the day of discharge, her  reported that the day she started becoming altered, their son had dislocated his arm while lifting weights, and was yelling in pain behind a locked door. When this occurred, she was in the shower, heard this happen, and ran and broke the door down to get to him, followed by taking him to the hospital.  This may have been the trigger this episode of delirium as she was continually focused on the time 11:11 when she was very confused, and still wants to leave the hospital today at 11:11, which was the time she had broken into their son's room. Plan:  - Fall precautions  - pending labs: ens2, hsv, Antimicrosomal Ab, B1  - Conservative management of delirium: minimize noise, maintain day/night cycle, provide frequent redirection, avoid restraints if possible, etc.   - appreciate psychiatry evaluation and recommendations. - Medical management and supportive care per primary team, notify with changes  - Seems reasonable to discharge her on a mood stabilizer and seroquel 25-50mg QHS PRN, details deferred to psychiatry. - appreciate endocrinology evaluation and recommendations, to whom synthroid dose management and interpretation of elevated thyroglobulin Abs is deferred. - if encephalopathy persists or is recurrent with otherwise negative work-up, would consider LP  - It seems reasonable for her to be discharged home today given her ongoing improvement.   She will have family supervision 24hrs a day (mother, , son). Explained to her  that she cannot drive until she completely returns to normal and she is cleared by psychiatry and neurology. He was in full agreement with this recommendation. Paulina Cordero will need follow up in in 4 weeks with general Attending or in resident clinic . She will not require outpatient neurological testing. Subjective:   No acute events reported overnight. Pt reportedly slept well again last night. Family and friend at bedside, with opportunity given for patient/ to ask anyone to leave who they did not want to hear all of pt's information. She is not able to or not willing to provide much information about the last several days. She does not seem to remember everything, and the details she did provide were either vague or unclear. When asked if she remembers going to the hospital or why she went, she remembers feeling happy about going to the ED, but anxious when people tried to examine/evaluate her. She could not clarify why she was happy or why she initially went to the ED. She complained of being tired and wanting to go home, but had no other complaints. She repeatedly requested that I stop asking her questions, and appeared to feign being asleep during the exam.  She appears to have been continuing to refuse to have her vital signs checked. ROS:   Review of Systems: as above      Vitals:   BP: (150-153)/(95-96) 153/96     Physical Exam:   GENERAL PHYSICAL EXAMINATION   Constitutional: No acute distress. Pleasant, well-groomed. ENMT: Mucous membranes are moist. Dentition is good   Cardiovascular: No edema. Pulmonary: normal respiratory effort. Musculoskeletal: See below. Psych: Ongoing poor insight and poor judgement. See mental status below     NEUROLOGICAL EXAMINATION   Mental Status: Mood and affect restricted/flat, but may be partially due to fatigue.   Alert and oriented to person, place and time. Knows the identity of the [facility] of [facility]. Inattentive and appeared to pretend to be asleep for a time as she did not want to participate in the exam.  Language: Spontaneous & fluent with likely reduced comprehension. Some difficulty naming objects (refused to try to name lenses on glasses, could not name 'ink' inside a pen (which she could name). She had difficulty following some simple commands. Cranial Nerves:   II, III: visual fields full to confrontation. III, IV, VI: Extraocular movements intact, no saccadic pursuit, no nystagmus, no ptosis. VII: Symmetric smile  VIII: Hearing intact to voice  IX, X: no dysarthria. XII: Tongue protrudes midline. There is no tongue atrophy or fasciculation. Motor examination: There is no atrophy and tone is normal. Strength 5/5 throughout, but she had a very hard time following instructions for this testing in UEs and LEs. No pronator drift. No fasciculations or spontaneous muscle movements are noted. Sensory examination: Sensation is symmetric to light touch and temperature. Reflexes: Reflexes are 3+ throughout. Toes downgoing bilaterally. Coordination: Finger to nose intact without dysmetria. There is no tremor noted. Gait: The patient is able to stand from a seated position with minimal use of the arms. The gait is narrow-based and stable. Medications:   All current active meds have been reviewed  Scheduled Meds:  Current Facility-Administered Medications   Medication Dose Route Frequency Provider Last Rate    acetaminophen  650 mg Oral Q6H PRN Js Dennison MD      aluminum-magnesium hydroxide-simethicone  30 mL Oral Q6H PRN Js Dennison MD      calcitriol  0.25 mcg Oral Daily Js Dennison MD      calcium carbonate-vitamin D  1 tablet Oral BID With Meals JERMAINE Downing      haloperidol lactate  5 mg Intramuscular HS PRN JERMAINE Downing      lamoTRIgine  25 mg Oral Daily Cheyenne LawJERMAINE      levothyroxine  88 mcg Oral Early Morning Cristobal Ivey MD      LORazepam  2 mg Intravenous HS PRN CheyenneJERMAINE Chiang      LORazepam  2 mg Intravenous Once JERMAINE Hernández      magnesium hydroxide  30 mL Oral Daily PRN Mary Ojeda MD      magnesium Oxide  400 mg Oral BID Mary Ojeda MD      magnesium sulfate  4 g Intravenous Once Mary Ojeda MD      multivitamin-minerals  1 tablet Oral Daily Mary Ojeda MD      OLANZapine  5 mg Oral TID PRN Mary Ojeda MD      ondansetron  4 mg Intravenous Q6H PRN Mary Ojeda MD      QUEtiapine  50 mg Oral HS JERMAINE Hernández       Continuous Infusions:   PRN Meds:.    acetaminophen    aluminum-magnesium hydroxide-simethicone    haloperidol lactate    LORazepam    magnesium hydroxide    OLANZapine    ondansetron     Labs: I have personally reviewed pertinent reports. Recent Results (from the past 24 hour(s))   Calcium, urine, 24 hour    Collection Time: 10/27/23  4:12 PM   Result Value Ref Range    24H Urine Volume 1,200 mL    Calcium, 24H Urine 39.6 (L) 100 - 300 mg/24 hrs   Fingerstick Glucose (POCT)    Collection Time: 10/28/23  7:52 AM   Result Value Ref Range    POC Glucose 86 65 - 140 mg/dl       Imaging: I have personally reviewed pertinent imaging in PACS, and I have personally reviewed PACS reports. Total time spent today 32 minutes. Greater than 50% of total time was spent with the patient and / or family counseling and / or coordination of care.

## 2023-10-28 NOTE — PLAN OF CARE
Problem: Potential for Falls  Goal: Patient will remain free of falls  Description: INTERVENTIONS:  - Educate patient/family on patient safety including physical limitations  - Instruct patient to call for assistance with activity   - Consult OT/PT to assist with strengthening/mobility   - Keep Call bell within reach  - Keep bed low and locked with side rails adjusted as appropriate  - Keep care items and personal belongings within reach  - Initiate and maintain comfort rounds  - Make Fall Risk Sign visible to staff  - Offer Toileting every  Hours, in advance of need  - Initiate/Maintain alarm  - Obtain necessary fall risk management equipment:   - Apply yellow socks and bracelet for high fall risk patients  - Consider moving patient to room near nurses station  Outcome: Progressing     Problem: MOBILITY - ADULT  Goal: Maintain or return to baseline ADL function  Description: INTERVENTIONS:  -  Assess patient's ability to carry out ADLs; assess patient's baseline for ADL function and identify physical deficits which impact ability to perform ADLs (bathing, care of mouth/teeth, toileting, grooming, dressing, etc.)  - Assess/evaluate cause of self-care deficits   - Assess range of motion  - Assess patient's mobility; develop plan if impaired  - Assess patient's need for assistive devices and provide as appropriate  - Encourage maximum independence but intervene and supervise when necessary  - Involve family in performance of ADLs  - Assess for home care needs following discharge   - Consider OT consult to assist with ADL evaluation and planning for discharge  - Provide patient education as appropriate  Outcome: Progressing  Goal: Maintains/Returns to pre admission functional level  Description: INTERVENTIONS:  - Perform BMAT or MOVE assessment daily.   - Set and communicate daily mobility goal to care team and patient/family/caregiver.    - Collaborate with rehabilitation services on mobility goals if consulted  - Perform Range of Motion  times a day. - Reposition patient every  hours.   - Dangle patient  times a day  - Stand patient  times a day  - Ambulate patient  times a day  - Out of bed to chair  times a day   - Out of bed for meals  times a day  - Out of bed for toileting  - Record patient progress and toleration of activity level   Outcome: Progressing     Problem: PAIN - ADULT  Goal: Verbalizes/displays adequate comfort level or baseline comfort level  Description: Interventions:  - Encourage patient to monitor pain and request assistance  - Assess pain using appropriate pain scale  - Administer analgesics based on type and severity of pain and evaluate response  - Implement non-pharmacological measures as appropriate and evaluate response  - Consider cultural and social influences on pain and pain management  - Notify physician/advanced practitioner if interventions unsuccessful or patient reports new pain  Outcome: Progressing     Problem: INFECTION - ADULT  Goal: Absence or prevention of progression during hospitalization  Description: INTERVENTIONS:  - Assess and monitor for signs and symptoms of infection  - Monitor lab/diagnostic results  - Monitor all insertion sites, i.e. indwelling lines, tubes, and drains  - Monitor endotracheal if appropriate and nasal secretions for changes in amount and color  - [place] appropriate cooling/warming therapies per order  - Administer medications as ordered  - Instruct and encourage patient and family to use good hand hygiene technique  - Identify and instruct in appropriate isolation precautions for identified infection/condition  Outcome: Progressing     Problem: SAFETY ADULT  Goal: Patient will remain free of falls  Description: INTERVENTIONS:  - Educate patient/family on patient safety including physical limitations  - Instruct patient to call for assistance with activity   - Consult OT/PT to assist with strengthening/mobility   - Keep Call bell within reach  - Keep bed low and locked with side rails adjusted as appropriate  - Keep care items and personal belongings within reach  - Initiate and maintain comfort rounds  - Make Fall Risk Sign visible to staff  - Offer Toileting every  Hours, in advance of need  - Initiate/Maintain alarm  - Obtain necessary fall risk management equipment:   - Apply yellow socks and bracelet for high fall risk patients  - Consider moving patient to room near nurses station  Outcome: Progressing  Goal: Maintain or return to baseline ADL function  Description: INTERVENTIONS:  -  Assess patient's ability to carry out ADLs; assess patient's baseline for ADL function and identify physical deficits which impact ability to perform ADLs (bathing, care of mouth/teeth, toileting, grooming, dressing, etc.)  - Assess/evaluate cause of self-care deficits   - Assess range of motion  - Assess patient's mobility; develop plan if impaired  - Assess patient's need for assistive devices and provide as appropriate  - Encourage maximum independence but intervene and supervise when necessary  - Involve family in performance of ADLs  - Assess for home care needs following discharge   - Consider OT consult to assist with ADL evaluation and planning for discharge  - Provide patient education as appropriate  Outcome: Progressing  Goal: Maintains/Returns to pre admission functional level  Description: INTERVENTIONS:  - Perform BMAT or MOVE assessment daily.   - Set and communicate daily mobility goal to care team and patient/family/caregiver. - Collaborate with rehabilitation services on mobility goals if consulted  - Perform Range of Motion  times a day. - Reposition patient every  hours.   - Dangle patient  times a day  - Stand patient  times a day  - Ambulate patient  times a day  - Out of bed to chair  times a day   - Out of bed for meals  times a day  - Out of bed for toileting  - Record patient progress and toleration of activity level   Outcome: Progressing Problem: DISCHARGE PLANNING  Goal: Discharge to home or other facility with appropriate resources  Description: INTERVENTIONS:  - Identify barriers to discharge w/patient and caregiver  - Arrange for needed discharge resources and transportation as appropriate  - Identify discharge learning needs (meds, wound care, etc.)  - Arrange for interpretive services to assist at discharge as needed  - Refer to Case Management Department for coordinating discharge planning if the patient needs post-hospital services based on physician/advanced practitioner order or complex needs related to functional status, cognitive ability, or social support system  Outcome: Progressing     Problem: Knowledge Deficit  Goal: Patient/family/caregiver demonstrates understanding of disease process, treatment plan, medications, and discharge instructions  Description: Complete learning assessment and assess knowledge base. Interventions:  - Provide teaching at level of understanding  - Provide teaching via preferred learning methods  Outcome: Progressing     Problem: Nutrition/Hydration-ADULT  Goal: Nutrient/Hydration intake appropriate for improving, restoring or maintaining nutritional needs  Description: Monitor and assess patient's nutrition/hydration status for malnutrition. Collaborate with interdisciplinary team and initiate plan and interventions as ordered. Monitor patient's weight and dietary intake as ordered or per policy. Utilize nutrition screening tool and intervene as necessary. Determine patient's food preferences and provide high-protein, high-caloric foods as appropriate.      INTERVENTIONS:  - Monitor oral intake, urinary output, labs, and treatment plans  - Assess nutrition and hydration status and recommend course of action  - Evaluate amount of meals eaten  - Assist patient with eating if necessary   - Allow adequate time for meals  - Recommend/ encourage appropriate diets, oral nutritional supplements, and vitamin/mineral supplements  - Order, calculate, and assess calorie counts as needed  - Recommend, monitor, and adjust tube feedings and TPN/PPN based on assessed needs  - Assess need for intravenous fluids  - Provide specific nutrition/hydration education as appropriate  - Include patient/family/caregiver in decisions related to nutrition  Outcome: Progressing

## 2023-10-28 NOTE — ASSESSMENT & PLAN NOTE
Patient is s/p total thyroidectomy for medullary cancer of the thyroid on 10/11  Endocrinology consulted  Synthroid increased to 88 mcg  Reordered 24-hour urine collection to include 5-HIAA, catecholamines, metanephrines, dopamine, VMA etc.  R/o hypercortisolism but could also be psuedocushing's.   Follow up OP

## 2023-10-28 NOTE — DISCHARGE SUMMARY
[address]  Discharge- Catrachito Been 1975, 56 y.o. female MRN: [id number]  Unit/Bed#: -01 Encounter: [id number]  Primary Care Provider: JERMAINE Ozuna   Date and time admitted to hospital: 10/24/2023 12:41 PM    * Acute metabolic encephalopathy  Assessment & Plan  Patient presented to the ED with complaints of change in mental status over the last 48 hours prior to admission. She is s/p thyroidectomy on 10/11 in setting of medullary thyroid carcinoma. Is increasingly agitated, and confused have periods of excessive energy where patient is walking about, and dancing and then has a sudden decrease where she nearly collapses to the bed with a couch and does not respond to questions  Endocrine consulted  Synthroid increased to 88 mcg  Follow up outpatient  Psych evaluated on 10/25   Will discharge with Lamictal and close out patient follow up  Encourage good sleep hygiene  Reconsulted psych on 10/26 as patient has become acutely more delirious with more bizarre behaviors periods of unresponsiveness with blank staring   Decided patient was having acute delirium as she had not slept in several days occasions were ordered and adjusted  Commending good sleep hygiene  Sending with PRN Seroquel   Neurology consulted  MRI negative for acute findings  B1, B12, folate, thiamine, PCR- follow up  labs at PCP  EEG negative  Continue good sleep hygiene, frequent reorientation    Medullary thyroid carcinoma [facility])  Assessment & Plan  Patient is s/p total thyroidectomy for medullary cancer of the thyroid on 10/11  Endocrinology consulted  Synthroid increased to 88 mcg  Reordered 24-hour urine collection to include 5-HIAA, catecholamines, metanephrines, dopamine, VMA etc.  R/o hypercortisolism but could also be psuedocushing's.   Follow up OP     Discharging Physician / Practitioner: JERMAINE Yen  PCP: Charito Morton, [address] [place] [address]  Admission Date:   Admission Orders (From admission, onward)       Ordered        10/24/23 1541  INPATIENT ADMISSION  Once                          Discharge Date: 10/28/23    Medical Problems       Resolved Problems  Date Reviewed: 10/25/2023   None         Consultations During Hospital Stay:  Matt Woodson TO ENDOCRINOLOGY  IP CONSULT TO PSYCHIATRY  IP CONSULT TO PSYCHIATRY  IP CONSULT TO NEUROLOGY    Procedures Performed:   EEG Routine and awake    Result Date: 10/27/2023  Narrative: Table formatting from the original result was not included. ELECTROENCEPHALOGRAM Patient Name:  Kris Soria  MRN: [id number] :  1975 File #: TOM #  Date performed: 10/27/23  Referring Provider: JERMAINE Mijares       Report date: 10/27/2023      Study type: awake and drowsy EEG ICD 10 diagnosis: Spells/Fit NOS, Seizures R56.9 and Encephalopathy, unspecified G93.40 Patient History: EEG is requested to assess for seizures and/or classification of epilepsy. Patient is 56 y.o. female with PMH of thyroid cancer s/p thyroidectomy and 1 cm hypodensity in R temporal lobe undergoing routine EEG to evaluate for encephalopathy and episodes of unresponsiveness. AED: lorazepam, valproic acid Medications include:  Facility-Administered Medications Ordered in Other Visits Medication Dose Route Frequency Provider Last Rate  acetaminophen  650 mg Oral Q6H PRN Katy Farrell MD    aluminum-magnesium hydroxide-simethicone  30 mL Oral Q6H PRN Katy Farrell MD    calcitriol  0.25 mcg Oral Daily Katy Farrell MD    calcium carbonate-vitamin D  1 tablet Oral BID With Meals Carline SerJERMAINE la    haloperidol lactate  5 mg Intramuscular HS PRN Carline SerJERMAINE la    levothyroxine  88 mcg Oral Early Morning Dora Masterson MD    LORazepam  2 mg Intravenous HS PRN Carline Sergeant, JERMAINE    LORazepam  2 mg Intravenous Once JERMAINE Hernández    magnesium hydroxide  30 mL Oral Daily PRN Katy Farrell MD    magnesium Oxide  400 mg Oral BID Hank Mariposa Mccain MD    magnesium sulfate  4 g Intravenous Once Sonal Niño MD    multivitamin-minerals  1 tablet Oral Daily Sonal Niño MD    OLANZapine  5 mg Oral TID PRN Sonal Niño MD    ondansetron  4 mg Intravenous Q6H PRN Sonal Niño MD    QUEtiapine  50 mg Oral HS Briseida JERMAINE Jean-Baptiste    valproate sodium  250 mg Intravenous BID [place], CRNP 250 mg (10/27/23 1052) Description of Procedure: The EEG was performed with electrodes applied using the International 10-20 System. Additional electrodes used included EOG, ECG and T1/T2 electrodes. A single lead ECG channel is also present. At least 16 channels are reviewed at a time and formatted into longitudinal bipolar, transverse bipolar, and referential (to common reference or calculated common reference) montages. The EEG was recorded with the patient awake and drowsy. The recording was technically satisfactory. EEG was recorded from 15:14 to 15:47. Findings: Background Activity: The background is grossly symmetric with respect to voltages and activities. During wakefulness, the background is well-organized with anterior low amplitude beta activity and posterior low-moderate amplitude alpha activity. There is a symmetric 9.5-10 Hz posterior dominant rhythm that attenuates with eye opening. Drowsiness is characterized by attenuation of the posterior dominant rhythm and prominent anterior beta activity. Activation Procedures: Hyperventilation was not performed . Stepped photic stimulation from 1 to 30 fps was performed and produced symmetric photic driving response. Other findings: The single lead ECG shows a normal and sinus rhythm. Interpretation: This is a normal 33 minutes awake and drowsy EEG.  Kailee Ramos MD Clinical Neurophysiology Fellow, PGY-5 [address] [facility] Neurology department Penny Frazier MD PhD Ирина [facility] [address] [facility] brain w wo contrast    Result Date: 10/27/2023  Narrative: MRI BRAIN WITH AND WITHOUT CONTRAST INDICATION: seizure. COMPARISON:  None. TECHNIQUE: Multiplanar, multisequence imaging of the brain was performed before and after gadolinium administration. IV Contrast:  4 mL of Gadobutrol injection (SINGLE-DOSE) IMAGE QUALITY:   Diagnostic. FINDINGS: BRAIN PARENCHYMA:  There is no discrete mass, mass effect or midline shift. There is no intracranial hemorrhage. Normal posterior fossa. Diffusion imaging is unremarkable. Minimal nonspecific white matter FLAIR signal hyperintensity. Hippocampal formations are symmetric in size and appearance without focal signal normality or volume loss. Postcontrast imaging of the brain demonstrates no abnormal enhancement. VENTRICLES:  Normal for the patient's age. SELLA AND PITUITARY GLAND:  Normal. ORBITS:  Normal. PARANASAL SINUSES:  Normal. VASCULATURE:  Evaluation of the major intracranial vasculature demonstrates appropriate flow voids. CALVARIUM AND SKULL BASE:  Normal. EXTRACRANIAL SOFT TISSUES:  Normal.     Impression: No mass effect, acute intracranial hemorrhage or evidence of recent infarction. No abnormal parenchymal leptomeningeal enhancement identified. Minimal nonspecific white matter FLAIR signal hyperintensity. Workstation performed: [id number]     CT head without contrast    Result Date: 10/24/2023  Narrative: CT BRAIN - WITHOUT CONTRAST INDICATION:   Delirium AMS. COMPARISON:  None. TECHNIQUE:  CT examination of the brain was performed. Multiplanar 2D reformatted images were created from the source data. Radiation dose length product (DLP) for this visit:  778 mGy-cm . This examination, like all CT scans performed in the [facility], was performed utilizing techniques to minimize radiation dose exposure, including the use of iterative reconstruction and automated exposure control. IMAGE QUALITY:  Diagnostic.  FINDINGS: PARENCHYMA: Rounded 1 cm hypodense lesion in the medial aspect of the right temporal lobe, series 2 image 14, perhaps a choroidal fissure cyst or prominent perivascular space, incompletely characterized. No mass effect. No acute intracranial hemorrhage. VENTRICLES AND EXTRA-AXIAL SPACES:  Normal for the patient's age. VISUALIZED ORBITS: Normal visualized orbits. PARANASAL SINUSES: Normal visualized paranasal sinuses. CALVARIUM AND EXTRACRANIAL SOFT TISSUES:  Normal.     Impression: 1. No acute intracranial hemorrhage or mass effect. No hydrocephalus. 2. Rounded 1 cm hypodense lesion in the right temporal lobe may represent a choroidal fissure cyst or prominent perivascular space. Other lesions not excluded. Follow-up contrast-enhanced MRI of the brain suggested for further characterization. Workstation performed: [id number]         Significant Findings / Test Results:   above    Incidental Findings:   no     Test Results Pending at Discharge (will require follow up):   no     Outpatient Tests Requested:  no    Complications:  no    History reviewed. No pertinent past medical history. Reason for Admission: Altered Mental Status (Per  pt has been exp change in mental status for the past 48 hours or so. Pt had thyroidectomy on 10/11. Pt appears confused and disoriented to situation intermittently during triage. )       Hospital Course:     Lon Juarez is a 56 y.o. female patient with past medical history of above who originally presented to the hospital on 10/24/2023 due to Altered Mental Status (Per  pt has been exp change in mental status for the past 48 hours or so. Pt had thyroidectomy on 10/11.  Pt appears confused and disoriented to situation intermittently during triage. )  Came in with an altered mental status metabolic entities were mostly ruled out she does have significant thyroid dysfunction related to a recent thyroidectomy thyroid antibody panel was sent and will need close outpatient follow-up her mentation seems to have improved however she is certainly not back to baseline her acute delirium could be related to several factors including that the patient had not slept for approximately 5 days has had a stressful year with thyroid cancer and then were son had an accident in the home that could have been the final stressor she does appear better she seems stable she is not a threat to herself or others and she has been discussed at length with psychiatry and is stable for discharge home with very close outpatient monitoring and follow-up this has been discussed with her and her entire family including her son, , mother and several friends who all verbalized understanding    Please see above list of diagnoses and related plan for additional information. Condition at Discharge: stable     Discharge Day Visit / Exam:     Subjective:  patient feels better today does not appear confused or agitated, doesn't really most of the hospitalization but seems to be nearly back to her usual state  Vitals: Blood Pressure: 153/96 (10/27/23 1757)  Pulse: 74 (10/26/23 1100)  Temperature: 98.3 °F (36.8 °C) (10/25/23 2201)  Temp Source: Oral (10/25/23 1500)  Respirations: 18 (10/26/23 0930)  Height: 5' 5" (165.1 cm) (10/24/23 1743)  Weight - Scale: 47.9 kg (105 lb 9.6 oz) (10/24/23 1743)  SpO2: 96 % (10/28/23 0820)  Exam:   Physical Exam  Vitals reviewed. Constitutional:       General: She is not in acute distress. Appearance: She is not ill-appearing. Cardiovascular:      Rate and Rhythm: Normal rate. Pulmonary:      Effort: No respiratory distress. Abdominal:      Palpations: Abdomen is soft. Skin:     General: Skin is warm. Neurological:      Mental Status: She is alert. Mental status is at baseline.    Psychiatric:         Mood and Affect: Mood normal.       Discussion with Family: all at bedside    Discharge instructions/Information to patient and family:   See after visit summary for information provided to patient and family. Provisions for Follow-Up Care:  See after visit summary for information related to follow-up care and any pertinent home health orders. Disposition:     Home    Planned Readmission: no     Discharge Statement:  I spent 45 minutes discharging the patient. This time was spent on the day of discharge. I had direct contact with the patient on the day of discharge. Greater than 50% of the total time was spent examining patient, answering all patient questions, arranging and discussing plan of care with patient as well as directly providing post-discharge instructions. Additional time then spent on discharge activities. Discharge Medications:  See after visit summary for reconciled discharge medications provided to patient and family.       ** Please Note: This note has been constructed using a voice recognition system **

## 2023-10-30 LAB
CADMIUM 24H UR-MCNC: NORMAL UG/L
CORTIS F 24H UR-MRATE: 30 UG/24 HR (ref 6–42)
CORTIS F UR-MCNC: 25 UG/L
CREAT UR-MCNC: 0.44 G/L (ref 0.3–3)

## 2023-10-31 LAB
CITRATE 24H UR-MCNC: 201 MG/L
CITRATE 24H UR-MRATE: 241 MG/24 HR (ref 320–1240)
HSV1 DNA SPEC QL NAA+PROBE: NEGATIVE
HSV2 DNA SPEC QL NAA+PROBE: NEGATIVE
KAPPA LC UR-MCNC: 4.29 MG/L (ref 1.17–86.46)
KAPPA LC/LAMBDA UR: >6.22 {RATIO} (ref 1.83–14.26)
LAMBDA LC UR-MCNC: <0.69 MG/L (ref 0.27–15.21)

## 2023-11-01 LAB
METANEPH 24H UR-MRATE: 110 UG/24 HR (ref 36–209)
METANEPHS 24H UR-MCNC: 92 UG/L
NORMETANEPHRINE 24H UR-MCNC: 225 UG/L
NORMETANEPHRINE 24H UR-MRATE: 270 UG/24 HR (ref 131–612)

## 2023-11-01 NOTE — UTILIZATION REVIEW
Please provide Authorization and Status. Thank you! NOTIFICATION OF ADMISSION DISCHARGE   This is a Notification of Discharge from Dontrell Singh. Please be advised that this patient has been discharge from our facility. Below you will find the admission and discharge date and time including the patient’s disposition. UTILIZATION REVIEW CONTACT:  Pablo Burris  Utilization   Network Utilization Review Department  Phone: [phone] x carefully listen to the prompts. All voicemails are confidential.  Email: [email or web address]     ADMISSION INFORMATION  PRESENTATION DATE: 10/24/2023 12:41 PM  OBERVATION ADMISSION DATE:   INPATIENT ADMISSION DATE: 10/24/23  3:41 PM   DISCHARGE DATE: 10/28/2023 12:25 PM   DISPOSITION:Home/Self Care    Network Utilization Review Department  ATTENTION: Please call with any questions or concerns to [phone] and carefully listen to the prompts so that you are directed to the right person. All voicemails are confidential.   For Discharge needs, contact Care Management DC Support Team at [phone] opt. 2  Send all requests for admission clinical reviews, approved or denied determinations and any other requests to dedicated fax number below belonging to the campus where the patient is receiving treatment.  List of dedicated fax numbers for the Facilities:  [place] DENIALS (Administrative/Medical Necessity) [phone]   DISCHARGE SUPPORT TEAM (Network) [phone] [facility] [phone]   [address] [facility] [address] [place] [address] [phone] [place] [id number] [address] [phone]   [address] [place] [address]  [facility] [phone]

## 2023-11-02 LAB
5OH-INDOLEACETATE 24H UR-MCNC: 1.7 MG/L
5OH-INDOLEACETATE 24H UR-MRATE: 2 MG/24 HR (ref 0–14.9)
VIT B1 BLD-SCNC: 96.2 NMOL/L (ref 66.5–200)

## 2023-11-03 LAB
17-KETOSTEROIDS 24 HOUR VOLUME: 5 MG/L
17KS 24H UR-MRATE: 6 MG/D (ref 4.4–14.2)
COLLECT DURATION TIME SPEC: 24 HR
CREAT 24H UR-MRATE: 516 MG/D (ref 700–1600)
CREAT UR-MCNC: 43 MG/DL
EPINEPH UR-MCNC: NORMAL UG/L
SPECIMEN VOL ?TM UR: 1200 ML

## 2023-11-08 LAB — MISCELLANEOUS LAB TEST RESULT: NORMAL

## 2023-11-14 ENCOUNTER — TELEPHONE (OUTPATIENT)
Dept: ENDOCRINOLOGY | Facility: CLINIC | Age: 48
End: 2023-11-14

## 2023-11-14 DIAGNOSIS — E89.0 H/O THYROIDECTOMY: ICD-10-CM

## 2023-11-14 RX ORDER — LEVOTHYROXINE SODIUM 88 UG/1
88 TABLET ORAL
Qty: 30 TABLET | Refills: 3 | Status: SHIPPED | OUTPATIENT
Start: 2023-11-14 | End: 2024-03-13

## 2023-11-14 NOTE — TELEPHONE ENCOUNTER
levothyroxine 88 mcg tablet [[id number]]    Order Details  Dose: 88 mcg Route: Oral Frequency: Daily (early morning)   Dispense Quantity: 30 tablet Refills: 0          Sig: Take 1 tablet (88 mcg total) by mouth daily in the early morning Do not start before October 29, 2023. Start Date: 10/29/23 End Date: 11/28/23 after 30 doses   Written Date: 10/28/23 Expiration Date: 10/27/24       Associated Diagnoses: H/O thyroidectomy [E89.0]   Providers    Ordering and Authorizing Provider:  Wili Macias [address] [place] [address]  Phone: [phone]   Fax: [phone]  FAHAD #: [id number]   NPI: [id number]        Ordering User: Wili Macias [address] [place] [address]/pharmacy #[id number] - [facility] [place] [place] - [address], [facility] [place] [address]  Phone: [phone]  Fax: [phone]  FAHAD #: [id number]   Wants a 90 day supply    Last seen 9/15   Next 12/15

## 2023-11-21 ENCOUNTER — TELEPHONE (OUTPATIENT)
Dept: FAMILY MEDICINE CLINIC | Facility: CLINIC | Age: 48
End: 2023-11-21

## 2023-11-21 DIAGNOSIS — Z12.31 SCREENING MAMMOGRAM FOR BREAST CANCER: Primary | ICD-10-CM

## 2023-11-21 NOTE — TELEPHONE ENCOUNTER
T/c from pt -- states she got a reminder that her Mammogram is due. Pt asking if an order can be placed so she can schedule it. Pt would like call back when order done.     Please advise

## 2023-11-24 DIAGNOSIS — R41.0 DELIRIUM: ICD-10-CM

## 2023-11-24 RX ORDER — QUETIAPINE FUMARATE 50 MG/1
50 TABLET, FILM COATED ORAL
Qty: 30 TABLET | Refills: 0 | Status: SHIPPED | OUTPATIENT
Start: 2023-11-24

## 2023-11-30 ENCOUNTER — APPOINTMENT (OUTPATIENT)
Age: 48
End: 2023-11-30
Payer: COMMERCIAL

## 2023-11-30 DIAGNOSIS — C73 MEDULLARY THYROID CARCINOMA (HCC): ICD-10-CM

## 2023-11-30 LAB
CEA SERPL-MCNC: 3.6 NG/ML (ref 0–3)
TSH SERPL DL<=0.05 MIU/L-ACNC: 2.36 UIU/ML (ref 0.45–4.5)

## 2023-11-30 PROCEDURE — 84443 ASSAY THYROID STIM HORMONE: CPT

## 2023-11-30 PROCEDURE — 82378 CARCINOEMBRYONIC ANTIGEN: CPT

## 2023-11-30 PROCEDURE — 36415 COLL VENOUS BLD VENIPUNCTURE: CPT

## 2023-11-30 PROCEDURE — 82308 ASSAY OF CALCITONIN: CPT

## 2023-12-04 LAB — CALCIT SERPL-MCNC: <2 PG/ML (ref 0–5)

## 2023-12-08 ENCOUNTER — TELEPHONE (OUTPATIENT)
Dept: NEUROLOGY | Facility: CLINIC | Age: 48
End: 2023-12-08

## 2023-12-26 ENCOUNTER — OFFICE VISIT (OUTPATIENT)
Dept: ENDOCRINOLOGY | Facility: CLINIC | Age: 48
End: 2023-12-26
Payer: COMMERCIAL

## 2023-12-26 ENCOUNTER — TELEPHONE (OUTPATIENT)
Dept: ENDOCRINOLOGY | Facility: CLINIC | Age: 48
End: 2023-12-26

## 2023-12-26 VITALS
BODY MASS INDEX: 19.49 KG/M2 | OXYGEN SATURATION: 98 % | HEART RATE: 85 BPM | HEIGHT: 65 IN | DIASTOLIC BLOOD PRESSURE: 82 MMHG | WEIGHT: 117 LBS | SYSTOLIC BLOOD PRESSURE: 122 MMHG

## 2023-12-26 DIAGNOSIS — C73 MEDULLARY THYROID CARCINOMA (HCC): Primary | ICD-10-CM

## 2023-12-26 DIAGNOSIS — E03.9 HYPOTHYROIDISM, UNSPECIFIED TYPE: ICD-10-CM

## 2023-12-26 PROCEDURE — 99214 OFFICE O/P EST MOD 30 MIN: CPT

## 2023-12-26 NOTE — TELEPHONE ENCOUNTER
[facility] called stating patient requires authorization for CPT code 56935 I call patient's insurance and is does not require one, I did notify Christin [phone]

## 2023-12-26 NOTE — PROGRESS NOTES
Established Patient Progress Note    CC: Follow up for history of MTC s/p total thyroidectomy    Impression & Plan:    Problem List Items Addressed This Visit          Endocrine    Medullary thyroid carcinoma (HCC) - Primary     Most recent calcitonin is undetectable and CEA has come down significantly to 3.6.     Plan:  - Repeat calcitonin and CEA in 3 months prior to next visit. Order given  - patient to continue follow up with surgical oncology. She has upcoming appointment January 30  - notify for any new or worsening symptoms  - she will complete CT abdomen which has been pending from prior visit  - she will also need updated imaging which can be ordered at the next visit.         Relevant Orders    CEA    Calcitonin    TSH, 3rd generation Lab Collect    T4, free Lab Collect    Hypothyroidism     Most recent thyroid labs are stable. Continue current regimen and repeat lab work prior to next visit. Patient to notify for any new or worsening symptoms.            Orders Placed This Encounter   Procedures    CEA     Standing Status:   Future     Standing Expiration Date:   12/26/2024    Calcitonin     This is a patient instruction: This test requires patient fasting for 12-14 hours.     Standing Status:   Future     Standing Expiration Date:   12/26/2024    TSH, 3rd generation Lab Collect     This is a patient instruction: This test is non-fasting. Please drink two glasses of water morning of bloodwork.        Standing Status:   Future     Standing Expiration Date:   12/26/2024    T4, free Lab Collect     Standing Status:   Future     Standing Expiration Date:   12/26/2024       History of Present Illness:     Alyce Kelly is a 48 y.o. female with a history of MTC s/p total thyroidectomy on 10/11/23 with Dr. Llamas from surgical oncology. Most recent lab work shows undetectable calcitonin level and significantly improved CEA level (3.6 from 138.9). Thyroid function is also stable on 88 mcg levothyroxine daily. Lab  work completed in October 2023 normal. CT abdomen pending. She denies any obstructive or compressive symptoms. She does report recent cough. She has no other complaints today.      Patient Active Problem List   Diagnosis    Cough    Allergic state    URI, acute    Melasma    Pulmonary nodule seen on imaging study    Pseudofolliculitis    Moderate episode of recurrent major depressive disorder (HCC)    Medullary thyroid carcinoma (HCC)    Hypercalcemia    Hypothyroidism    Acute delirium    Hypomagnesemia    Delirium      No past medical history on file.   Past Surgical History:   Procedure Laterality Date    SC THYROIDECTOMY TOTAL/SUBTOTAL LMTD NECK DISSECT N/A 10/11/2023    Procedure: TOTAL THYROIDECTOMY, BILATERAL CENTRAL COMPARTMENT NECK DISSECTION WITH NIMS;  Surgeon: Marcio Llamas MD;  Location: [facility];  Service: ENT    US GUIDED THYROID BIOPSY  08/18/2023    WISDOM TOOTH EXTRACTION        Family History   Problem Relation Age of Onset    No Known Problems Mother     No Known Problems Father     Breast cancer Paternal Aunt 50     Social History     Tobacco Use    Smoking status: Never    Smokeless tobacco: Never   Substance Use Topics    Alcohol use: Never     No Known Allergies      Current Outpatient Medications:     Biotin 10 MG TABS, Take by mouth, Disp: , Rfl:     Calcium Carb-Cholecalciferol (calcium carbonate-vitamin D) 500 mg-5 mcg tablet, PLEASE SEE ATTACHED FOR DETAILED DIRECTIONS, Disp: , Rfl:     levothyroxine 88 mcg tablet, Take 1 tablet (88 mcg total) by mouth daily in the early morning, Disp: 30 tablet, Rfl: 3    QUEtiapine (SEROquel) 50 mg tablet, TAKE 1 TABLET BY MOUTH DAILY AT BEDTIME, Disp: 30 tablet, Rfl: 0    calcitriol (ROCALTROL) 0.25 mcg capsule, Take 1 capsule (0.25 mcg total) by mouth if needed (Please contact the office if you experience any numbness or tingling of the fingers, toes, and mouth and we will tell you to take this medication.) for up to 21 days, Disp: 30 capsule,  "Rfl: 0    lamoTRIgine (LaMICtal) 25 mg tablet, Take 1 tablet (25 mg total) by mouth 2 (two) times a day, Disp: 60 tablet, Rfl: 0    magnesium oxide (MAG-OX) 400 mg tablet, TAKE 1 TABLET (400 MG TOTAL) BY MOUTH 2 (TWO) TIMES A DAY FOR 21 DAYS (Patient not taking: Reported on 10/24/2023), Disp: , Rfl:     Current Facility-Administered Medications:     EPINEPHrine (EPIPEN) injection 0.3 mg, 0.3 mg, Intramuscular, PRN, JERMAINE Jovel    Review of Systems   Constitutional:  Negative for chills and fever.   HENT:  Negative for ear pain and sore throat.    Eyes:  Negative for pain and visual disturbance.   Respiratory:  Negative for cough and shortness of breath.    Cardiovascular:  Negative for chest pain and palpitations.   Gastrointestinal:  Negative for abdominal pain and vomiting.   Genitourinary:  Negative for dysuria and hematuria.   Musculoskeletal:  Negative for arthralgias and back pain.   Skin:  Negative for color change and rash.   Neurological:  Negative for seizures and syncope.   All other systems reviewed and are negative.      Physical Exam:  Body mass index is 19.47 kg/m².  /82 (BP Location: Left arm, Patient Position: Sitting, Cuff Size: Adult)   Pulse 85   Ht 5' 5\" (1.651 m)   Wt 53.1 kg (117 lb)   SpO2 98%   BMI 19.47 kg/m²    Wt Readings from Last 3 Encounters:   12/26/23 53.1 kg (117 lb)   10/24/23 47.9 kg (105 lb 9.6 oz)   10/27/23 48 kg (105 lb 13.1 oz)       Physical Exam  Vitals reviewed.   Constitutional:       Appearance: Normal appearance.   HENT:      Head: Normocephalic and atraumatic.   Neck:     Cardiovascular:      Rate and Rhythm: Normal rate.      Heart sounds: Normal heart sounds.   Pulmonary:      Effort: Pulmonary effort is normal.      Breath sounds: Normal breath sounds.   Musculoskeletal:      Cervical back: Neck supple. No tenderness.   Lymphadenopathy:      Cervical: No cervical adenopathy.   Neurological:      Mental Status: She is alert and oriented to person, " place, and time.   Psychiatric:         Mood and Affect: Mood normal.         Behavior: Behavior normal.         Labs:   Lab Results   Component Value Date    HGBA1C 5.1 12/16/2022     Lab Results   Component Value Date    CREATININE 0.61 10/26/2023    CREATININE 0.59 (L) 10/25/2023    CREATININE 0.71 10/24/2023    BUN 11 10/26/2023    K 3.8 10/26/2023     10/26/2023    CO2 23 10/26/2023     eGFR   Date Value Ref Range Status   10/26/2023 109 ml/min/1.73sq m Final     Lab Results   Component Value Date    HDL 76 12/16/2022    TRIG 75 12/16/2022     Lab Results   Component Value Date    ALT 13 10/25/2023    AST 16 10/25/2023    ALKPHOS 46 10/25/2023     Lab Results   Component Value Date    QIG2YJBKCBAI 2.362 11/30/2023    QRF3VTQJAAQF 10.665 (H) 10/24/2023    IUD7YCLVZESZ 8.626 (H) 10/23/2023     Lab Results   Component Value Date    FREET4 0.86 10/23/2023         There are no Patient Instructions on file for this visit.      Discussed with the patient and all questioned fully answered. She will call me if any problems arise.

## 2023-12-26 NOTE — ASSESSMENT & PLAN NOTE
Most recent calcitonin is undetectable and CEA has come down significantly to 3.6.     Plan:  - Repeat calcitonin and CEA in 3 months prior to next visit. Order given  - patient to continue follow up with surgical oncology. She has upcoming appointment January 30  - notify for any new or worsening symptoms  - she will complete CT abdomen which has been pending from prior visit  - she will also need updated imaging which can be ordered at the next visit.

## 2023-12-26 NOTE — ASSESSMENT & PLAN NOTE
Most recent thyroid labs are stable. Continue current regimen and repeat lab work prior to next visit. Patient to notify for any new or worsening symptoms.

## 2023-12-27 DIAGNOSIS — E89.0 H/O THYROIDECTOMY: ICD-10-CM

## 2023-12-27 RX ORDER — LEVOTHYROXINE SODIUM 88 UG/1
88 TABLET ORAL
Qty: 90 TABLET | Refills: 0 | Status: SHIPPED | OUTPATIENT
Start: 2023-12-27 | End: 2024-03-26

## 2024-01-05 ENCOUNTER — HOSPITAL ENCOUNTER (OUTPATIENT)
Age: 49
Discharge: HOME | End: 2024-01-05
Payer: COMMERCIAL

## 2024-01-05 VITALS — WEIGHT: 106 LBS | BODY MASS INDEX: 17.66 KG/M2 | HEIGHT: 65 IN

## 2024-01-05 DIAGNOSIS — Z12.31 SCREENING MAMMOGRAM FOR BREAST CANCER: ICD-10-CM

## 2024-01-05 PROCEDURE — 77067 SCR MAMMO BI INCL CAD: CPT

## 2024-01-05 PROCEDURE — 77063 BREAST TOMOSYNTHESIS BI: CPT

## 2024-01-17 ENCOUNTER — TELEPHONE (OUTPATIENT)
Age: 49
End: 2024-01-17

## 2024-02-02 ENCOUNTER — APPOINTMENT (OUTPATIENT)
Age: 49
End: 2024-02-02
Payer: COMMERCIAL

## 2024-02-02 DIAGNOSIS — C73 MEDULLARY THYROID CARCINOMA (HCC): ICD-10-CM

## 2024-02-02 DIAGNOSIS — F32.9 MAJOR DEPRESSIVE DISORDER WITH CURRENT ACTIVE EPISODE, UNSPECIFIED DEPRESSION EPISODE SEVERITY, UNSPECIFIED WHETHER RECURRENT: ICD-10-CM

## 2024-02-02 LAB
ALBUMIN SERPL BCP-MCNC: 4.6 G/DL (ref 3.5–5)
ALP SERPL-CCNC: 42 U/L (ref 34–104)
ALT SERPL W P-5'-P-CCNC: 13 U/L (ref 7–52)
ANION GAP SERPL CALCULATED.3IONS-SCNC: 9 MMOL/L
AST SERPL W P-5'-P-CCNC: 18 U/L (ref 13–39)
BILIRUB SERPL-MCNC: 0.65 MG/DL (ref 0.2–1)
BUN SERPL-MCNC: 14 MG/DL (ref 5–25)
CALCIUM SERPL-MCNC: 9.4 MG/DL (ref 8.4–10.2)
CHLORIDE SERPL-SCNC: 102 MMOL/L (ref 96–108)
CO2 SERPL-SCNC: 28 MMOL/L (ref 21–32)
CREAT SERPL-MCNC: 0.68 MG/DL (ref 0.6–1.3)
GFR SERPL CREATININE-BSD FRML MDRD: 104 ML/MIN/1.73SQ M
GLUCOSE P FAST SERPL-MCNC: 87 MG/DL (ref 65–99)
POTASSIUM SERPL-SCNC: 3.9 MMOL/L (ref 3.5–5.3)
PROT SERPL-MCNC: 7.9 G/DL (ref 6.4–8.4)
PTH-INTACT SERPL-MCNC: 52.6 PG/ML (ref 12–88)
SODIUM SERPL-SCNC: 139 MMOL/L (ref 135–147)
T4 FREE SERPL-MCNC: 1.33 NG/DL (ref 0.61–1.12)
TSH SERPL DL<=0.05 MIU/L-ACNC: 0.26 UIU/ML (ref 0.45–4.5)

## 2024-02-02 PROCEDURE — 84439 ASSAY OF FREE THYROXINE: CPT

## 2024-02-02 PROCEDURE — 80053 COMPREHEN METABOLIC PANEL: CPT

## 2024-02-02 PROCEDURE — 83970 ASSAY OF PARATHORMONE: CPT

## 2024-02-02 PROCEDURE — 84443 ASSAY THYROID STIM HORMONE: CPT

## 2024-02-02 PROCEDURE — 36415 COLL VENOUS BLD VENIPUNCTURE: CPT

## 2024-02-19 ENCOUNTER — TELEPHONE (OUTPATIENT)
Dept: NEUROLOGY | Facility: CLINIC | Age: 49
End: 2024-02-19

## 2024-02-19 NOTE — TELEPHONE ENCOUNTER
Attempted to reach but unable to leave a message due to communication consent not indicating able to leave message. Patient didn't answer phone. Attempting to reschedule cancelledappt on 3/15/24 at 1 PM with Dr. Reid by Celi.

## 2024-02-21 PROBLEM — R05.9 COUGH: Status: RESOLVED | Noted: 2018-02-12 | Resolved: 2024-02-21

## 2024-02-21 PROBLEM — J06.9 URI, ACUTE: Status: RESOLVED | Noted: 2019-02-22 | Resolved: 2024-02-21

## 2024-03-01 NOTE — CASE MANAGEMENT
Anesthesia Post-op Note    Patient: Wong Black  Procedure(s) Performed: MRI BRAIN WO CONTRAST  Anesthesia type: GA    Vitals Value Taken Time   Temp 36.2 03/01/24 1329   Pulse 93 03/01/24 1149   Resp 22 03/01/24 1149   SpO2 98 % 03/01/24 1149   /61 03/01/24 1149         Patient Location: PACU Phase 1  Post-op Vital Signs:stable  Level of Consciousness: awake and alert  Respiratory Status: spontaneous ventilation  Cardiovascular stable  Hydration: euvolemic  Pain Management: adequately controlled  Handoff: Handoff to receiving clinician was performed and questions were answered  Vomiting: none  Nausea: Nausea: na.  Airway Patency:patent  Post-op Assessment: no complications, patient tolerated procedure well and moving all extremities      No notable events documented.                       Case Management Progress Note    Patient name Catrachito Been  Location [address] [facility] [place] 211/-01 MRN [id number]  : 1975 Date 10/27/2023       LOS (days): 3  Geometric Mean LOS (GMLOS) (days):   Days to GMLOS:        OBJECTIVE:    Current admission status: Inpatient  Preferred Pharmacy:   [facility]/pharmacy #[id number]- [facility] [address] - [address] [facility] [address]  Phone: [phone] Fax: [phone]    Primary Care Provider: JERMAINE Ozuna  Primary Insurance: [employer]  Secondary Insurance:     PROGRESS NOTE:  Patient reviewed during Interdisciplinary Rounds with [facility] today. Anticipated d/c is TBD. CM provided billing contact info to spouse, per request, for billing/insurance questions. CM will continue to follow for d/c planning needs. Anahi Early is 24.

## 2024-03-07 DIAGNOSIS — E89.0 H/O THYROIDECTOMY: ICD-10-CM

## 2024-03-07 RX ORDER — LEVOTHYROXINE SODIUM 88 UG/1
88 TABLET ORAL
Qty: 90 TABLET | Refills: 3 | Status: SHIPPED | OUTPATIENT
Start: 2024-03-07

## 2024-03-11 ENCOUNTER — APPOINTMENT (OUTPATIENT)
Age: 49
End: 2024-03-11
Payer: COMMERCIAL

## 2024-03-11 DIAGNOSIS — C73 MEDULLARY THYROID CARCINOMA (HCC): ICD-10-CM

## 2024-03-11 LAB — CEA SERPL-MCNC: 0.5 NG/ML (ref 0–3)

## 2024-03-11 PROCEDURE — 82378 CARCINOEMBRYONIC ANTIGEN: CPT

## 2024-03-11 PROCEDURE — 82308 ASSAY OF CALCITONIN: CPT

## 2024-03-11 PROCEDURE — 36415 COLL VENOUS BLD VENIPUNCTURE: CPT

## 2024-03-13 LAB — CALCIT SERPL-MCNC: <2 PG/ML (ref 0–5)

## 2024-03-15 ENCOUNTER — TELEPHONE (OUTPATIENT)
Dept: ENDOCRINOLOGY | Facility: CLINIC | Age: 49
End: 2024-03-15

## 2024-03-15 NOTE — TELEPHONE ENCOUNTER
----- Message from JERMAINE Hendricks sent at 3/14/2024  8:59 AM EDT -----  CEA improved and calcitonin undetectable which is good. No suspicion for recurrence of cancer. Continue monitoring.

## 2024-05-23 ENCOUNTER — OFFICE VISIT (OUTPATIENT)
Dept: ENDOCRINOLOGY | Facility: CLINIC | Age: 49
End: 2024-05-23
Payer: COMMERCIAL

## 2024-05-23 VITALS
OXYGEN SATURATION: 98 % | DIASTOLIC BLOOD PRESSURE: 60 MMHG | SYSTOLIC BLOOD PRESSURE: 94 MMHG | HEIGHT: 65 IN | BODY MASS INDEX: 18.09 KG/M2 | HEART RATE: 72 BPM | WEIGHT: 108.6 LBS

## 2024-05-23 DIAGNOSIS — C73 MEDULLARY THYROID CARCINOMA (HCC): Primary | ICD-10-CM

## 2024-05-23 DIAGNOSIS — E89.0 H/O THYROIDECTOMY: ICD-10-CM

## 2024-05-23 DIAGNOSIS — C73 PAPILLARY THYROID CARCINOMA (HCC): ICD-10-CM

## 2024-05-23 DIAGNOSIS — E03.9 HYPOTHYROIDISM, UNSPECIFIED TYPE: ICD-10-CM

## 2024-05-23 PROBLEM — Z98.890 H/O THYROIDECTOMY: Status: ACTIVE | Noted: 2024-05-23

## 2024-05-23 PROBLEM — Z90.89 H/O THYROIDECTOMY: Status: ACTIVE | Noted: 2024-05-23

## 2024-05-23 PROCEDURE — 99214 OFFICE O/P EST MOD 30 MIN: CPT | Performed by: INTERNAL MEDICINE

## 2024-05-23 RX ORDER — LEVOTHYROXINE SODIUM 88 UG/1
TABLET ORAL
Qty: 90 TABLET | Refills: 1 | Status: SHIPPED | OUTPATIENT
Start: 2024-05-23

## 2024-05-23 NOTE — PROGRESS NOTES
Alyce Kelly 48 y.o. female MRN: [id number]    Encounter: [id number]      Assessment & Plan     Problem List Items Addressed This Visit          Endocrine    Hypothyroidism     Continue levothyroxine at current dose-we will check thyroid function tests         Relevant Medications    levothyroxine 88 mcg tablet    Other Relevant Orders    TSH, 3rd generation    T4, free    Medullary thyroid carcinoma (HCC) - Primary     S/p total thyroidectomy and central lymph node dissection-CEA and calcitonin have normalized, continue to monitor         Relevant Medications    levothyroxine 88 mcg tablet    Papillary thyroid carcinoma (HCC)     Micropapillary thyroid cancer-will check TG/TG antibody level         Relevant Medications    levothyroxine 88 mcg tablet    Other Relevant Orders    Thyroglobulin       Surgery/Wound/Pain    H/O thyroidectomy    Relevant Medications    levothyroxine 88 mcg tablet        CC: hypothyroidism       History of Present Illness      HPI:  48 female medullary thyroid cancer, s/p total thyroidectomy, postsurgical hypothyroidism seen in [facility] was also incidentally found to have micropapillary thyroid cancer on the thyroidectomy specimen  Ret eliel-oncogene negative  for hypothyroidism she is currently on Lt4 88 mcg daily and has been taking it regularly and properly     No weight changes, no constipation ,   Doesn't sleep well  No palpitations   Menstrual cycles fairly regular     No fatigue         Review of Systems    Historical Information   History reviewed. No pertinent past medical history.  Past Surgical History:   Procedure Laterality Date    NH THYROIDECTOMY TOTAL/SUBTOTAL LMTD NECK DISSECT N/A 10/11/2023    Procedure: TOTAL THYROIDECTOMY, BILATERAL CENTRAL COMPARTMENT NECK DISSECTION WITH NIMS;  Surgeon: Marcio Llamas MD;  Location: [facility];  Service: ENT    US GUIDED THYROID BIOPSY  08/18/2023    WISDOM TOOTH EXTRACTION       Social History   Social History     Substance and  "Sexual Activity   Alcohol Use Never     Social History     Substance and Sexual Activity   Drug Use Never     Social History     Tobacco Use   Smoking Status Never   Smokeless Tobacco Never     Family History:   Family History   Problem Relation Age of Onset    No Known Problems Mother     No Known Problems Father     Breast cancer Paternal Aunt 50       Meds/Allergies   Current Outpatient Medications   Medication Sig Dispense Refill    levothyroxine 88 mcg tablet 1 tab daily 90 tablet 1     Current Facility-Administered Medications   Medication Dose Route Frequency Provider Last Rate Last Admin    EPINEPHrine (EPIPEN) injection 0.3 mg  0.3 mg Intramuscular PRN JERMAINE Jovel         No Known Allergies    Objective   Vitals: Blood pressure 94/60, pulse 72, height 5' 5\" (1.651 m), weight 49.3 kg (108 lb 9.6 oz), SpO2 98%.    Physical Exam  Vitals reviewed.   Constitutional:       General: She is not in acute distress.     Appearance: Normal appearance. She is not ill-appearing, toxic-appearing or diaphoretic.   HENT:      Head: Normocephalic and atraumatic.   Eyes:      General: No scleral icterus.     Extraocular Movements: Extraocular movements intact.   Cardiovascular:      Rate and Rhythm: Normal rate and regular rhythm.      Heart sounds: Normal heart sounds. No murmur heard.  Pulmonary:      Effort: Pulmonary effort is normal. No respiratory distress.      Breath sounds: Normal breath sounds. No wheezing or rales.   Musculoskeletal:      Cervical back: Neck supple.      Right lower leg: No edema.      Left lower leg: No edema.   Lymphadenopathy:      Cervical: No cervical adenopathy.   Skin:     General: Skin is warm and dry.   Neurological:      General: No focal deficit present.      Mental Status: She is alert and oriented to person, place, and time.      Gait: Gait normal.   Psychiatric:         Mood and Affect: Mood normal.         Behavior: Behavior normal.         Thought Content: Thought content " "normal.         Judgment: Judgment normal.       The history was obtained from the review of the chart, patient.    Lab Results:   Lab Results   Component Value Date/Time    TSH 3RD GENERATON 0.256 (L) 02/02/2024 07:08 AM    TSH 3RD GENERATON 2.362 11/30/2023 08:16 AM    TSH 3RD GENERATON 10.665 (H) 10/24/2023 01:00 PM    Free T4 1.33 (H) 02/02/2024 07:08 AM    Free T4 0.86 10/23/2023 07:05 AM    Free T4 0.77 09/18/2023 07:52 AM    Thyroglobulin Ab 118.3 (H) 10/26/2023 06:53 PM       Final Diagnosis A. Thyroid, Thyroid - suture marks right superior pole: - Medullary carcinoma of thyroid, 2.9 cm with intra tumoral amyloid deposits, involving right thyroid lobe.. - Tumor is diffusely positive with synaptophysin, chromogranin, TTF1. PAX8 appears weakly positive. - Amyloid is highlighted with Congo Red stain. 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 - Lymphocytic thyroiditis. - One benign parathyroid, 5 mm. - Two lymph nodes, negative for carcinoma (0/2). - See synoptic report. B. Lymph Node, Neck - central compartment: - Eleven lymph nodes, negative for carcinoma (0/11) Electronically signed by Dajuan Zhou MD on 10/18/2023at 1518    Imaging Studies:  Results for orders placed during the hospital encounter of 09/08/23    US head neck lymph node mapping    Impression  1.  No suspicious lymphadenopathy in the neck.      Workstation performed: [id number]         I have personally reviewed pertinent reports.      Portions of the record may have been created with voice recognition software. Occasional wrong word or \"sound a like\" substitutions may have occurred due to the inherent limitations of voice recognition software. Read the chart carefully and recognize, using context, where substitutions have occurred.    "

## 2024-05-24 NOTE — ASSESSMENT & PLAN NOTE
S/p total thyroidectomy and central lymph node dissection-CEA and calcitonin have normalized, continue to monitor

## 2024-06-21 ENCOUNTER — APPOINTMENT (OUTPATIENT)
Age: 49
End: 2024-06-21
Payer: COMMERCIAL

## 2024-06-21 LAB
T4 FREE SERPL-MCNC: 1 NG/DL (ref 0.61–1.12)
TSH SERPL DL<=0.05 MIU/L-ACNC: 0.09 UIU/ML (ref 0.45–4.5)

## 2024-06-24 DIAGNOSIS — E89.0 H/O THYROIDECTOMY: ICD-10-CM

## 2024-06-24 DIAGNOSIS — E03.9 HYPOTHYROIDISM, UNSPECIFIED TYPE: ICD-10-CM

## 2024-06-24 DIAGNOSIS — E03.9 HYPOTHYROIDISM, UNSPECIFIED TYPE: Primary | ICD-10-CM

## 2024-06-24 RX ORDER — LEVOTHYROXINE SODIUM 88 UG/1
TABLET ORAL
Qty: 90 TABLET | Refills: 0 | OUTPATIENT
Start: 2024-06-24

## 2024-06-24 RX ORDER — LEVOTHYROXINE SODIUM 0.07 MG/1
75 TABLET ORAL DAILY
Qty: 30 TABLET | Refills: 3 | Status: SHIPPED | OUTPATIENT
Start: 2024-06-24 | End: 2024-06-24 | Stop reason: SDUPTHER

## 2024-06-24 RX ORDER — LEVOTHYROXINE SODIUM 0.07 MG/1
75 TABLET ORAL DAILY
Qty: 90 TABLET | Refills: 1 | Status: SHIPPED | OUTPATIENT
Start: 2024-06-24

## 2024-06-24 NOTE — TELEPHONE ENCOUNTER
Pt needs a script sent into the pharmacy for the 75 mg. Pt only has the 88 mg.     Reason for call:   [x] Refill   [] Prior Auth  [] Other:     Office:   [] PCP/Provider -   [x] Specialty/Provider - Endo    Medication: levothyroxine 88 mcg tablet 1 tab daily       Pharmacy: [facility] - [address]      Does the patient have enough for 3 days?   [] Yes   [x] No - Send as HP to POD

## 2024-06-28 ENCOUNTER — TELEPHONE (OUTPATIENT)
Age: 49
End: 2024-06-28

## 2024-06-28 NOTE — TELEPHONE ENCOUNTER
Patient called in to ask If thyroglobulin results are in yet, she has not yet received on her Mychart. Upon review of chart, thyroglobulin is still in process, informed patient.

## 2024-07-02 LAB
THYROGLOB AB SERPL-ACNC: 10.8 IU/ML (ref 0–0.9)
THYROGLOB SERPL-MCNC: <2 NG/ML

## 2024-07-02 NOTE — TELEPHONE ENCOUNTER
Patient states her thyroglobulin results are in. Please review and send patient Couple message with results.  Thank you

## 2024-07-03 DIAGNOSIS — C73 PAPILLARY THYROID CARCINOMA (HCC): Primary | ICD-10-CM

## 2024-07-03 NOTE — TELEPHONE ENCOUNTER
Patient called again- she wants to know what to do about her thyroglobulin because one came back high.    I can explained that we would call her when the results have been reviewed, and our providers see patients throughout the day along with answering messages. She verbalized understanding.

## 2024-07-03 NOTE — TELEPHONE ENCOUNTER
Called patient and discussed results with her over the phone. Tg remains elevated but has significantly improved from prior. CEA and calcitonin are stable. Patient denies any compressive or obstructive symptoms. Recommend patient to repeat TG panel in 6 months and notify in interim with any new or worsening symptoms

## 2024-08-09 ENCOUNTER — LAB (OUTPATIENT)
Age: 49
End: 2024-08-09
Payer: COMMERCIAL

## 2024-08-09 DIAGNOSIS — E03.9 HYPOTHYROIDISM, UNSPECIFIED TYPE: ICD-10-CM

## 2024-08-09 LAB
T4 FREE SERPL-MCNC: 1 NG/DL (ref 0.61–1.12)
TSH SERPL DL<=0.05 MIU/L-ACNC: 1.54 UIU/ML (ref 0.45–4.5)

## 2024-08-09 PROCEDURE — 36415 COLL VENOUS BLD VENIPUNCTURE: CPT

## 2024-08-09 PROCEDURE — 84439 ASSAY OF FREE THYROXINE: CPT

## 2024-08-09 PROCEDURE — 84443 ASSAY THYROID STIM HORMONE: CPT

## 2024-08-27 DIAGNOSIS — E03.9 HYPOTHYROIDISM, UNSPECIFIED TYPE: ICD-10-CM

## 2024-08-27 RX ORDER — LEVOTHYROXINE SODIUM 75 UG/1
75 TABLET ORAL DAILY
Qty: 90 TABLET | Refills: 1 | Status: SHIPPED | OUTPATIENT
Start: 2024-08-27

## 2024-08-27 NOTE — TELEPHONE ENCOUNTER
Reason for call:   [x] Refill   [] Prior Auth  [] Other: Patient stated she will is aware she is requesting this medication early. She is going away on vacation mid September and will be away for one month. Please review.     Office:   [] PCP/Provider -   [x] Specialty/Provider - [facility] -  Jaclyn Abel MD     Medication: levothyroxine (Synthroid) 75 mcg tablet     Dose/Frequency: Take 1 tablet (75 mcg total) by mouth daily     Quantity: 90 tablet     Pharmacy: [facility] - [address] [phone]    Does the patient have enough for 3 days?   [x] Yes   [] No - Send as HP to POD  \

## 2024-11-19 ENCOUNTER — OFFICE VISIT (OUTPATIENT)
Dept: ENDOCRINOLOGY | Facility: CLINIC | Age: 49
End: 2024-11-19
Payer: COMMERCIAL

## 2024-11-19 VITALS
OXYGEN SATURATION: 99 % | HEART RATE: 74 BPM | BODY MASS INDEX: 18.69 KG/M2 | SYSTOLIC BLOOD PRESSURE: 100 MMHG | WEIGHT: 112.2 LBS | DIASTOLIC BLOOD PRESSURE: 64 MMHG | HEIGHT: 65 IN

## 2024-11-19 DIAGNOSIS — E03.9 HYPOTHYROIDISM, UNSPECIFIED TYPE: ICD-10-CM

## 2024-11-19 DIAGNOSIS — C73 PAPILLARY THYROID CARCINOMA (HCC): ICD-10-CM

## 2024-11-19 DIAGNOSIS — C73 MEDULLARY THYROID CARCINOMA (HCC): Primary | ICD-10-CM

## 2024-11-19 PROCEDURE — 99214 OFFICE O/P EST MOD 30 MIN: CPT

## 2024-11-19 RX ORDER — LEVOTHYROXINE SODIUM 75 UG/1
75 TABLET ORAL DAILY
Qty: 90 TABLET | Refills: 2 | Status: SHIPPED | OUTPATIENT
Start: 2024-11-19

## 2024-11-19 NOTE — ASSESSMENT & PLAN NOTE
Update CEA and calcitonin level in Jan 2025.     Orders:    Calcitonin; Future    CEA; Future    US head neck lymph node mapping; Future

## 2024-11-19 NOTE — ASSESSMENT & PLAN NOTE
Due for TG panel in Jan 2025 which has been ordered  Due for yearly thyroid US- also ordered  Orders:    US head neck lymph node mapping; Future

## 2024-11-19 NOTE — ASSESSMENT & PLAN NOTE
TSH, T4 within range. Continue regimen  Orders:    levothyroxine (Synthroid) 75 mcg tablet; Take 1 tablet (75 mcg total) by mouth daily    TSH + Free T4; Future

## 2024-11-19 NOTE — PROGRESS NOTES
Established Patient Progress Note    CC: Follow up for history of papillary and medullary thyroid cancer; post-operative hypothyroidism    Impression & Plan:    Assessment & Plan  Medullary thyroid carcinoma (HCC)  Update CEA and calcitonin level in Jan 2025.     Orders:    Calcitonin; Future    CEA; Future    US head neck lymph node mapping; Future    Hypothyroidism, unspecified type  TSH, T4 within range. Continue regimen  Orders:    levothyroxine (Synthroid) 75 mcg tablet; Take 1 tablet (75 mcg total) by mouth daily    TSH + Free T4; Future    Papillary thyroid carcinoma (HCC)  Due for TG panel in Jan 2025 which has been ordered  Due for yearly thyroid US- also ordered  Orders:    US head neck lymph node mapping; Future      Orders Placed This Encounter   Procedures    US head neck lymph node mapping     Standing Status:   Future     Expected Date:   11/19/2024     Expiration Date:   11/19/2028     Scheduling Instructions:      No prep required.        Please bring your insurance cards and a form of photo ID.  Bring your order/script for the test if from a non - [facility] provider.        Arrive 15 minutes prior to your appointment time to register.            To schedule this appointment, please contact Central Scheduling at [phone].          Calcitonin     This is a patient instruction: This test requires patient fasting for 12-14 hours.     Standing Status:   Future     Expiration Date:   11/19/2025    CEA     Standing Status:   Future     Expiration Date:   11/19/2025    TSH + Free T4     Standing Status:   Future     Expected Date:   5/19/2025     Expiration Date:   11/19/2025       History of Present Illness:   Alyce Kelly is a 48 y.o. female with a history of MTC and papillary thyroid cancer s/p total thyroidectomy on 10/11/23 with Dr. Llamas from surgical oncology. Takes 75 mcg levothyroxine daily. Denies any compressive or obstructive symptoms.     Ret eliel-oncogene negative.    She has no  complaints or concerns today      Patient Active Problem List   Diagnosis    Allergic state    Melasma    Pulmonary nodule seen on imaging study    Pseudofolliculitis    Moderate episode of recurrent major depressive disorder (HCC)    Medullary thyroid carcinoma (HCC)    Hypercalcemia    Hypothyroidism    Acute delirium    Hypomagnesemia    Delirium    Papillary thyroid carcinoma (HCC)    H/O thyroidectomy      History reviewed. No pertinent past medical history.   Past Surgical History:   Procedure Laterality Date    DC THYROIDECTOMY TOTAL/SUBTOTAL LMTD NECK DISSECT N/A 10/11/2023    Procedure: TOTAL THYROIDECTOMY, BILATERAL CENTRAL COMPARTMENT NECK DISSECTION WITH NIMS;  Surgeon: Marcio Llamas MD;  Location: [facility];  Service: ENT    US GUIDED THYROID BIOPSY  08/18/2023    WISDOM TOOTH EXTRACTION        Family History   Problem Relation Age of Onset    No Known Problems Mother     No Known Problems Father     Breast cancer Paternal Aunt 50     Social History     Tobacco Use    Smoking status: Never    Smokeless tobacco: Never   Substance Use Topics    Alcohol use: Never     No Known Allergies      Current Outpatient Medications:     levothyroxine (Synthroid) 75 mcg tablet, Take 1 tablet (75 mcg total) by mouth daily, Disp: 90 tablet, Rfl: 2    Current Facility-Administered Medications:     EPINEPHrine (EPIPEN) injection 0.3 mg, 0.3 mg, Intramuscular, PRN, Dolcaitline JERMAINE Siddiqi    Review of Systems   Constitutional:  Negative for chills and fever.   HENT:  Negative for ear pain and sore throat.    Eyes:  Negative for pain and visual disturbance.   Respiratory:  Negative for cough and shortness of breath.    Cardiovascular:  Negative for chest pain and palpitations.   Gastrointestinal:  Negative for abdominal pain and vomiting.   Genitourinary:  Negative for dysuria and hematuria.   Musculoskeletal:  Negative for arthralgias and back pain.   Skin:  Negative for color change and rash.   Neurological:  Negative for  "seizures and syncope.   All other systems reviewed and are negative.      Physical Exam:  Body mass index is 18.67 kg/m².  /64 (BP Location: Right arm, Patient Position: Sitting, Cuff Size: Adult)   Pulse 74   Ht 5' 5\" (1.651 m)   Wt 50.9 kg (112 lb 3.2 oz)   SpO2 99%   BMI 18.67 kg/m²    Wt Readings from Last 3 Encounters:   11/19/24 50.9 kg (112 lb 3.2 oz)   05/23/24 49.3 kg (108 lb 9.6 oz)   01/05/24 48.1 kg (106 lb)       Physical Exam  Vitals reviewed.   Constitutional:       Appearance: Normal appearance.   HENT:      Head: Normocephalic and atraumatic.   Cardiovascular:      Rate and Rhythm: Normal rate.   Pulmonary:      Effort: Pulmonary effort is normal.   Musculoskeletal:      Cervical back: Neck supple. No tenderness.   Lymphadenopathy:      Cervical: No cervical adenopathy.   Neurological:      Mental Status: She is alert and oriented to person, place, and time.   Psychiatric:         Mood and Affect: Mood normal.         Behavior: Behavior normal.         Labs:   Lab Results   Component Value Date    HGBA1C 5.1 12/16/2022     Lab Results   Component Value Date    CREATININE 0.68 02/02/2024    CREATININE 0.61 10/26/2023    CREATININE 0.59 (L) 10/25/2023    BUN 14 02/02/2024    K 3.9 02/02/2024     02/02/2024    CO2 28 02/02/2024     eGFR   Date Value Ref Range Status   02/02/2024 104 ml/min/1.73sq m Final     Lab Results   Component Value Date    HDL 76 12/16/2022    TRIG 75 12/16/2022     Lab Results   Component Value Date    ALT 13 02/02/2024    AST 18 02/02/2024    ALKPHOS 42 02/02/2024     Lab Results   Component Value Date    RBN8PWFBWRJO 1.538 08/09/2024    EOP9GSQUQZNF 0.087 (L) 06/21/2024    XAO9JGKRLTRC 0.256 (L) 02/02/2024     Lab Results   Component Value Date    FREET4 1.00 08/09/2024         There are no Patient Instructions on file for this visit.      Discussed with the patient and all questioned fully answered. She will call me if any problems arise.    "

## 2024-12-09 ENCOUNTER — TELEPHONE (OUTPATIENT)
Age: 49
End: 2024-12-09

## 2024-12-09 NOTE — TELEPHONE ENCOUNTER
Patient calling stating she got a reminder call today to get the US head neck lymph node mapping done that was ordered by Jessica Hinojosa following her last appointment she had with us in November.     Patient states she was not made aware she would need an US and is questioning if this is necessary.     Patient asked that I sent a message and ask that we check with Dr. Abel to see if she really wants her to get this done.     Please advise.

## 2025-01-23 ENCOUNTER — APPOINTMENT (OUTPATIENT)
Age: 50
End: 2025-01-23
Payer: COMMERCIAL

## 2025-01-23 DIAGNOSIS — C73 MEDULLARY THYROID CARCINOMA (HCC): ICD-10-CM

## 2025-01-23 DIAGNOSIS — C73 PAPILLARY THYROID CARCINOMA (HCC): ICD-10-CM

## 2025-01-23 LAB — CEA SERPL-MCNC: 0.6 NG/ML (ref 0–3)

## 2025-01-23 PROCEDURE — 82308 ASSAY OF CALCITONIN: CPT

## 2025-01-23 PROCEDURE — 36415 COLL VENOUS BLD VENIPUNCTURE: CPT

## 2025-01-23 PROCEDURE — 82378 CARCINOEMBRYONIC ANTIGEN: CPT

## 2025-01-23 PROCEDURE — 84432 ASSAY OF THYROGLOBULIN: CPT

## 2025-01-23 PROCEDURE — 86800 THYROGLOBULIN ANTIBODY: CPT

## 2025-01-26 LAB — CALCIT SERPL-MCNC: <2 PG/ML (ref 0–5)

## 2025-01-27 ENCOUNTER — RESULTS FOLLOW-UP (OUTPATIENT)
Dept: ENDOCRINOLOGY | Facility: CLINIC | Age: 50
End: 2025-01-27

## 2025-02-01 LAB
THYROGLOB AB SERPL-ACNC: 7 IU/ML (ref 0–0.9)
THYROGLOB SERPL-MCNC: <2 NG/ML

## 2025-03-21 ENCOUNTER — TELEPHONE (OUTPATIENT)
Age: 50
End: 2025-03-21

## 2025-03-21 NOTE — TELEPHONE ENCOUNTER
Patient called stating she never heard about her lab results from January. Relayed message from the doctor under lab section. Patient expressed understanding. She canceled her May appointment and states she will call back to reschedule.  No further action needed

## (undated) DEVICE — SPONGE STICK WITH PVP-I: Brand: KENDALL

## (undated) DEVICE — SUT VICRYL 3-0 SH 27 IN J416H

## (undated) DEVICE — SUT SILK 3-0 18 IN A184H

## (undated) DEVICE — BIPOLAR CORD DISP

## (undated) DEVICE — SUT SILK 4-0 30 IN A303H

## (undated) DEVICE — JACKSON-PRATT 100CC BULB RESERVOIR: Brand: CARDINAL HEALTH

## (undated) DEVICE — INTENDED FOR TISSUE SEPARATION, AND OTHER PROCEDURES THAT REQUIRE A SHARP SURGICAL BLADE TO PUNCTURE OR CUT.: Brand: BARD-PARKER SAFETY BLADES SIZE 15, STERILE

## (undated) DEVICE — SUT MONOCRYL 5-0 P-3 18 IN Y493G

## (undated) DEVICE — SUT VICRYL 2-0 SH 27 IN UNDYED J417H

## (undated) DEVICE — CHLORAPREP HI-LITE 26ML ORANGE

## (undated) DEVICE — GLOVE SRG BIOGEL 7.5

## (undated) DEVICE — 3M™ STERI-STRIP™ REINFORCED ADHESIVE SKIN CLOSURES, R1547, 1/2 IN X 4 IN (12 MM X 100 MM), 6 STRIPS/ENVELOPE: Brand: 3M™ STERI-STRIP™

## (undated) DEVICE — DECANTER: Brand: UNBRANDED

## (undated) DEVICE — TRAY FOLEY 16FR URIMETER SURESTEP

## (undated) DEVICE — SUT SILK 2-0 18 IN A185H

## (undated) DEVICE — 3M™ STERI-STRIP™ COMPOUND BENZOIN TINCTURE 40 BAGS/CARTON 4 CARTONS/CASE C1544: Brand: 3M™ STERI-STRIP™

## (undated) DEVICE — SURGICEL FIBRILLAR 1 X 2

## (undated) DEVICE — SUT MONOCRYL 4-0 PS-2 27 IN Y426H

## (undated) DEVICE — ELECTRODE BLADE MOD E-Z CLEAN  2.75IN 7CM -0012AM

## (undated) DEVICE — TIBURON SPLIT SHEET: Brand: CONVERTORS

## (undated) DEVICE — LIGACLIP MCA MULTIPLE CLIP APPLIERS, 20 SMALL CLIPS: Brand: LIGACLIP

## (undated) DEVICE — SUT SILK 3-0 SH 30 IN K832H

## (undated) DEVICE — ELECTRODE 8227411 PAIRED 4 CH SET ROHS

## (undated) DEVICE — PACK UNIVERSAL NECK

## (undated) DEVICE — DRAIN JACKSON PRATT 15FR: Brand: CARDINAL HEALTH

## (undated) DEVICE — LIGACLIP MCA MULTIPLE CLIP APPLIERS, 20 MEDIUM CLIPS: Brand: LIGACLIP

## (undated) DEVICE — NEEDLE 25G X 1 1/2

## (undated) DEVICE — PROBE 8225101 5PK STD PRASS FL TIP ROHS

## (undated) DEVICE — STAYS ELASTIC 5MM SHARP HOOK LONE STAR